# Patient Record
Sex: MALE | Race: OTHER | NOT HISPANIC OR LATINO
[De-identification: names, ages, dates, MRNs, and addresses within clinical notes are randomized per-mention and may not be internally consistent; named-entity substitution may affect disease eponyms.]

---

## 2017-04-26 ENCOUNTER — APPOINTMENT (OUTPATIENT)
Dept: UROLOGY | Facility: CLINIC | Age: 67
End: 2017-04-26

## 2017-04-27 LAB
APPEARANCE: CLEAR
BACTERIA: NEGATIVE
BILIRUBIN URINE: NEGATIVE
BLOOD URINE: NEGATIVE
COLOR: YELLOW
GLUCOSE QUALITATIVE U: NORMAL MG/DL
HYALINE CASTS: 0 /LPF
KETONES URINE: NEGATIVE
LEUKOCYTE ESTERASE URINE: NEGATIVE
MICROSCOPIC-UA: NORMAL
NITRITE URINE: NEGATIVE
PH URINE: 5.5
PROTEIN URINE: NEGATIVE MG/DL
PSA FREE FLD-MCNC: 20.5 %
PSA FREE SERPL-MCNC: 1.3 NG/ML
PSA SERPL-MCNC: 6.35 NG/ML
RED BLOOD CELLS URINE: 3 /HPF
SPECIFIC GRAVITY URINE: 1.01
SQUAMOUS EPITHELIAL CELLS: 0 /HPF
UROBILINOGEN URINE: NORMAL MG/DL
WHITE BLOOD CELLS URINE: 4 /HPF

## 2017-10-25 ENCOUNTER — APPOINTMENT (OUTPATIENT)
Dept: UROLOGY | Facility: CLINIC | Age: 67
End: 2017-10-25
Payer: COMMERCIAL

## 2017-10-25 PROCEDURE — 99214 OFFICE O/P EST MOD 30 MIN: CPT

## 2017-10-26 LAB
ANION GAP SERPL CALC-SCNC: 23 MMOL/L
APPEARANCE: CLEAR
BACTERIA: NEGATIVE
BILIRUBIN URINE: NEGATIVE
BLOOD URINE: NEGATIVE
BUN SERPL-MCNC: 21 MG/DL
CALCIUM SERPL-MCNC: 10.1 MG/DL
CHLORIDE SERPL-SCNC: 102 MMOL/L
CO2 SERPL-SCNC: 19 MMOL/L
COLOR: YELLOW
CORE LAB FLUID CYTOLOGY: NORMAL
CREAT SERPL-MCNC: 1.08 MG/DL
GLUCOSE QUALITATIVE U: NEGATIVE MG/DL
GLUCOSE SERPL-MCNC: 88 MG/DL
KETONES URINE: NEGATIVE
LEUKOCYTE ESTERASE URINE: NEGATIVE
MICROSCOPIC-UA: NORMAL
NITRITE URINE: NEGATIVE
PH URINE: 5.5
POTASSIUM SERPL-SCNC: 4.4 MMOL/L
PROTEIN URINE: NEGATIVE MG/DL
PSA FREE FLD-MCNC: 24.7
PSA FREE SERPL-MCNC: 1.94 NG/ML
PSA SERPL-MCNC: 7.84 NG/ML
RED BLOOD CELLS URINE: 1 /HPF
SODIUM SERPL-SCNC: 144 MMOL/L
SPECIFIC GRAVITY URINE: 1.01
SQUAMOUS EPITHELIAL CELLS: 0 /HPF
UROBILINOGEN URINE: NEGATIVE MG/DL
WHITE BLOOD CELLS URINE: 1 /HPF

## 2018-04-25 ENCOUNTER — APPOINTMENT (OUTPATIENT)
Dept: UROLOGY | Facility: CLINIC | Age: 68
End: 2018-04-25
Payer: COMMERCIAL

## 2018-04-25 DIAGNOSIS — R97.20 ELEVATED PROSTATE, SPECIFIC ANTIGEN [PSA]: ICD-10-CM

## 2018-04-25 DIAGNOSIS — Z00.00 ENCOUNTER FOR GENERAL ADULT MEDICAL EXAMINATION W/OUT ABNORMAL FINDINGS: ICD-10-CM

## 2018-04-25 PROCEDURE — 99214 OFFICE O/P EST MOD 30 MIN: CPT

## 2018-04-26 LAB
ANION GAP SERPL CALC-SCNC: 21 MMOL/L
APPEARANCE: CLEAR
BACTERIA: NEGATIVE
BILIRUBIN URINE: NEGATIVE
BLOOD URINE: NEGATIVE
BUN SERPL-MCNC: 21 MG/DL
CALCIUM SERPL-MCNC: 10.5 MG/DL
CHLORIDE SERPL-SCNC: 101 MMOL/L
CO2 SERPL-SCNC: 20 MMOL/L
COLOR: YELLOW
CREAT SERPL-MCNC: 1.17 MG/DL
GLUCOSE QUALITATIVE U: NEGATIVE MG/DL
GLUCOSE SERPL-MCNC: 91 MG/DL
HYALINE CASTS: 0 /LPF
KETONES URINE: NEGATIVE
LEUKOCYTE ESTERASE URINE: NEGATIVE
MICROSCOPIC-UA: NORMAL
NITRITE URINE: NEGATIVE
PH URINE: 5.5
POTASSIUM SERPL-SCNC: 4.9 MMOL/L
PROTEIN URINE: NEGATIVE MG/DL
PSA FREE FLD-MCNC: 29.3
PSA FREE SERPL-MCNC: 2.48 NG/ML
PSA SERPL-MCNC: 8.46 NG/ML
RED BLOOD CELLS URINE: 2 /HPF
SODIUM SERPL-SCNC: 142 MMOL/L
SPECIFIC GRAVITY URINE: 1.02
SQUAMOUS EPITHELIAL CELLS: 0 /HPF
UROBILINOGEN URINE: 1 MG/DL
WHITE BLOOD CELLS URINE: 0 /HPF

## 2018-10-31 ENCOUNTER — APPOINTMENT (OUTPATIENT)
Dept: UROLOGY | Facility: CLINIC | Age: 68
End: 2018-10-31
Payer: MEDICARE

## 2018-10-31 PROCEDURE — 99214 OFFICE O/P EST MOD 30 MIN: CPT

## 2018-11-01 LAB
APPEARANCE: CLEAR
BACTERIA: NEGATIVE
BILIRUBIN URINE: NEGATIVE
BLOOD URINE: NEGATIVE
COLOR: YELLOW
GLUCOSE QUALITATIVE U: NEGATIVE MG/DL
HYALINE CASTS: 1 /LPF
KETONES URINE: NEGATIVE
LEUKOCYTE ESTERASE URINE: NEGATIVE
MICROSCOPIC-UA: NORMAL
NITRITE URINE: NEGATIVE
PH URINE: 5.5
PROTEIN URINE: NEGATIVE MG/DL
PSA FREE FLD-MCNC: 27.7
PSA FREE SERPL-MCNC: 2.7 NG/ML
PSA SERPL-MCNC: 9.76 NG/ML
RED BLOOD CELLS URINE: 2 /HPF
SPECIFIC GRAVITY URINE: 1.02
SQUAMOUS EPITHELIAL CELLS: 0 /HPF
UROBILINOGEN URINE: 1 MG/DL
WHITE BLOOD CELLS URINE: 1 /HPF

## 2018-11-03 LAB — CORE LAB FLUID CYTOLOGY: NORMAL

## 2019-05-08 ENCOUNTER — APPOINTMENT (OUTPATIENT)
Dept: UROLOGY | Facility: CLINIC | Age: 69
End: 2019-05-08
Payer: MEDICARE

## 2019-05-08 PROCEDURE — 99214 OFFICE O/P EST MOD 30 MIN: CPT

## 2019-05-08 NOTE — PHYSICAL EXAM
[Normal Appearance] : normal appearance [General Appearance - Well Developed] : well developed [General Appearance - Well Nourished] : well nourished [Well Groomed] : well groomed [General Appearance - In No Acute Distress] : no acute distress [Abdomen Soft] : soft [Costovertebral Angle Tenderness] : no ~M costovertebral angle tenderness [Abdomen Tenderness] : non-tender [Testes Mass (___cm)] : there were no testicular masses [Scrotum] : the scrotum was normal [Urethral Meatus] : meatus normal [Urinary Bladder Findings] : the bladder was normal on palpation [No Prostate Nodules] : no prostate nodules [Edema] : no peripheral edema [Respiration, Rhythm And Depth] : normal respiratory rhythm and effort [] : no respiratory distress [Affect] : the affect was normal [Exaggerated Use Of Accessory Muscles For Inspiration] : no accessory muscle use [Oriented To Time, Place, And Person] : oriented to person, place, and time [Not Anxious] : not anxious [Mood] : the mood was normal [Normal Station and Gait] : the gait and station were normal for the patient's age [No Focal Deficits] : no focal deficits [No Palpable Adenopathy] : no palpable adenopathy

## 2019-05-09 LAB
APPEARANCE: CLEAR
BACTERIA: NEGATIVE
BILIRUBIN URINE: NEGATIVE
BLOOD URINE: NEGATIVE
COLOR: NORMAL
GLUCOSE QUALITATIVE U: NEGATIVE
HYALINE CASTS: 0 /LPF
KETONES URINE: NEGATIVE
LEUKOCYTE ESTERASE URINE: NEGATIVE
MICROSCOPIC-UA: NORMAL
NITRITE URINE: NEGATIVE
PH URINE: 6
PROTEIN URINE: NORMAL
PSA FREE FLD-MCNC: 24 %
PSA FREE SERPL-MCNC: 1.86 NG/ML
PSA SERPL-MCNC: 7.64 NG/ML
RED BLOOD CELLS URINE: 1 /HPF
SPECIFIC GRAVITY URINE: 1.02
SQUAMOUS EPITHELIAL CELLS: 0 /HPF
UROBILINOGEN URINE: NORMAL
WHITE BLOOD CELLS URINE: 0 /HPF

## 2019-12-11 ENCOUNTER — APPOINTMENT (OUTPATIENT)
Dept: UROLOGY | Facility: CLINIC | Age: 69
End: 2019-12-11
Payer: COMMERCIAL

## 2019-12-11 DIAGNOSIS — R97.20 ELEVATED PROSTATE, SPECIFIC ANTIGEN [PSA]: ICD-10-CM

## 2019-12-11 PROCEDURE — 99214 OFFICE O/P EST MOD 30 MIN: CPT

## 2019-12-11 NOTE — PHYSICAL EXAM
[General Appearance - Well Developed] : well developed [General Appearance - Well Nourished] : well nourished [Well Groomed] : well groomed [Normal Appearance] : normal appearance [General Appearance - In No Acute Distress] : no acute distress [Abdomen Soft] : soft [Costovertebral Angle Tenderness] : no ~M costovertebral angle tenderness [Abdomen Tenderness] : non-tender [Urethral Meatus] : meatus normal [Urinary Bladder Findings] : the bladder was normal on palpation [Scrotum] : the scrotum was normal [Testes Mass (___cm)] : there were no testicular masses [No Prostate Nodules] : no prostate nodules [] : no respiratory distress [Edema] : no peripheral edema [Respiration, Rhythm And Depth] : normal respiratory rhythm and effort [Affect] : the affect was normal [Oriented To Time, Place, And Person] : oriented to person, place, and time [Exaggerated Use Of Accessory Muscles For Inspiration] : no accessory muscle use [Mood] : the mood was normal [Not Anxious] : not anxious [Normal Station and Gait] : the gait and station were normal for the patient's age [No Palpable Adenopathy] : no palpable adenopathy [No Focal Deficits] : no focal deficits

## 2019-12-12 LAB
APPEARANCE: CLEAR
BACTERIA: NEGATIVE
BILIRUBIN URINE: NEGATIVE
BLOOD URINE: NEGATIVE
COLOR: NORMAL
GLUCOSE QUALITATIVE U: NEGATIVE
HYALINE CASTS: 1 /LPF
KETONES URINE: NEGATIVE
LEUKOCYTE ESTERASE URINE: NEGATIVE
MICROSCOPIC-UA: NORMAL
NITRITE URINE: NEGATIVE
PH URINE: 5.5
PROTEIN URINE: NORMAL
PSA FREE FLD-MCNC: 29 %
PSA FREE SERPL-MCNC: 2.41 NG/ML
PSA SERPL-MCNC: 8.18 NG/ML
RED BLOOD CELLS URINE: 0 /HPF
SPECIFIC GRAVITY URINE: 1.02
SQUAMOUS EPITHELIAL CELLS: 0 /HPF
UROBILINOGEN URINE: NORMAL
WHITE BLOOD CELLS URINE: 1 /HPF

## 2020-06-24 ENCOUNTER — APPOINTMENT (OUTPATIENT)
Dept: UROLOGY | Facility: CLINIC | Age: 70
End: 2020-06-24

## 2021-03-04 ENCOUNTER — APPOINTMENT (OUTPATIENT)
Dept: UROLOGY | Facility: CLINIC | Age: 71
End: 2021-03-04
Payer: MEDICARE

## 2021-03-04 PROCEDURE — 99214 OFFICE O/P EST MOD 30 MIN: CPT

## 2021-03-05 LAB
APPEARANCE: CLEAR
BACTERIA: NEGATIVE
BILIRUBIN URINE: NEGATIVE
BLOOD URINE: NEGATIVE
COLOR: YELLOW
GLUCOSE QUALITATIVE U: NEGATIVE
HYALINE CASTS: 0 /LPF
KETONES URINE: NEGATIVE
LEUKOCYTE ESTERASE URINE: NEGATIVE
MICROSCOPIC-UA: NORMAL
NITRITE URINE: NEGATIVE
PH URINE: 6
PROTEIN URINE: ABNORMAL
PSA FREE FLD-MCNC: 22 %
PSA FREE SERPL-MCNC: 1.57 NG/ML
PSA SERPL-MCNC: 7.16 NG/ML
RED BLOOD CELLS URINE: 1 /HPF
SPECIFIC GRAVITY URINE: 1.03
SQUAMOUS EPITHELIAL CELLS: 1 /HPF
UROBILINOGEN URINE: NORMAL
WHITE BLOOD CELLS URINE: 1 /HPF

## 2021-07-14 ENCOUNTER — OUTPATIENT (OUTPATIENT)
Dept: OUTPATIENT SERVICES | Facility: HOSPITAL | Age: 71
LOS: 1 days | End: 2021-07-14
Payer: MEDICARE

## 2021-07-14 ENCOUNTER — APPOINTMENT (OUTPATIENT)
Dept: ULTRASOUND IMAGING | Facility: CLINIC | Age: 71
End: 2021-07-14
Payer: MEDICARE

## 2021-07-14 DIAGNOSIS — Z00.8 ENCOUNTER FOR OTHER GENERAL EXAMINATION: ICD-10-CM

## 2021-07-14 PROCEDURE — 93970 EXTREMITY STUDY: CPT

## 2021-07-14 PROCEDURE — 93970 EXTREMITY STUDY: CPT | Mod: 26

## 2022-04-27 ENCOUNTER — APPOINTMENT (OUTPATIENT)
Dept: UROLOGY | Facility: CLINIC | Age: 72
End: 2022-04-27
Payer: MEDICARE

## 2022-04-27 DIAGNOSIS — R97.20 ELEVATED PROSTATE, SPECIFIC ANTIGEN [PSA]: ICD-10-CM

## 2022-04-27 PROCEDURE — 99214 OFFICE O/P EST MOD 30 MIN: CPT

## 2022-04-28 LAB
APPEARANCE: CLEAR
BACTERIA: NEGATIVE
BILIRUBIN URINE: NEGATIVE
BLOOD URINE: NEGATIVE
COLOR: YELLOW
GLUCOSE QUALITATIVE U: NEGATIVE
HYALINE CASTS: 0 /LPF
KETONES URINE: NEGATIVE
LEUKOCYTE ESTERASE URINE: NEGATIVE
MICROSCOPIC-UA: NORMAL
NITRITE URINE: NEGATIVE
PH URINE: 6
PROTEIN URINE: ABNORMAL
PSA FREE FLD-MCNC: 27 %
PSA FREE SERPL-MCNC: 1.99 NG/ML
PSA SERPL-MCNC: 7.48 NG/ML
RED BLOOD CELLS URINE: 1 /HPF
SPECIFIC GRAVITY URINE: 1.02
SQUAMOUS EPITHELIAL CELLS: 0 /HPF
UROBILINOGEN URINE: NORMAL
WHITE BLOOD CELLS URINE: 1 /HPF

## 2023-04-27 ENCOUNTER — APPOINTMENT (OUTPATIENT)
Dept: UROLOGY | Facility: CLINIC | Age: 73
End: 2023-04-27
Payer: MEDICARE

## 2023-04-27 DIAGNOSIS — R35.0 FREQUENCY OF MICTURITION: ICD-10-CM

## 2023-04-27 DIAGNOSIS — R97.20 ELEVATED PROSTATE, SPECIFIC ANTIGEN [PSA]: ICD-10-CM

## 2023-04-27 PROCEDURE — 88112 CYTOPATH CELL ENHANCE TECH: CPT | Mod: 26

## 2023-04-27 PROCEDURE — 99214 OFFICE O/P EST MOD 30 MIN: CPT

## 2023-04-28 LAB
APPEARANCE: CLEAR
BACTERIA: NEGATIVE /HPF
BILIRUBIN URINE: NEGATIVE
BLOOD URINE: NEGATIVE
CAST: 0 /LPF
COLOR: NORMAL
EPITHELIAL CELLS: 1 /HPF
GLUCOSE QUALITATIVE U: NEGATIVE MG/DL
KETONES URINE: NEGATIVE MG/DL
LEUKOCYTE ESTERASE URINE: NEGATIVE
MICROSCOPIC-UA: NORMAL
NITRITE URINE: NEGATIVE
PH URINE: 5.5
PROTEIN URINE: 30 MG/DL
PSA FREE FLD-MCNC: 24 %
PSA FREE SERPL-MCNC: 2.53 NG/ML
PSA SERPL-MCNC: 10.4 NG/ML
RED BLOOD CELLS URINE: 1 /HPF
SPECIFIC GRAVITY URINE: 1.02
URINE CYTOLOGY: NORMAL
UROBILINOGEN URINE: 0.2 MG/DL
WHITE BLOOD CELLS URINE: 1 /HPF

## 2024-03-01 ENCOUNTER — OFFICE (OUTPATIENT)
Dept: URBAN - METROPOLITAN AREA CLINIC 70 | Facility: CLINIC | Age: 74
Setting detail: OPHTHALMOLOGY
End: 2024-03-01
Payer: MEDICARE

## 2024-03-01 ENCOUNTER — RX ONLY (RX ONLY)
Age: 74
End: 2024-03-01

## 2024-03-01 DIAGNOSIS — H25.13: ICD-10-CM

## 2024-03-01 DIAGNOSIS — H16.223: ICD-10-CM

## 2024-03-01 PROCEDURE — 92004 COMPRE OPH EXAM NEW PT 1/>: CPT | Performed by: STUDENT IN AN ORGANIZED HEALTH CARE EDUCATION/TRAINING PROGRAM

## 2024-03-01 ASSESSMENT — REFRACTION_CURRENTRX
OS_ADD: +2.50
OS_CYLINDER: -3.25
OD_OVR_VA: 20/
OS_SPHERE: +1.00
OD_ADD: +2.50
OD_AXIS: 97
OD_CYLINDER: -3.50
OD_SPHERE: +0.50
OS_AXIS: 082
OS_OVR_VA: 20/

## 2024-05-01 ENCOUNTER — APPOINTMENT (OUTPATIENT)
Dept: UROLOGY | Facility: CLINIC | Age: 74
End: 2024-05-01
Payer: MEDICARE

## 2024-05-01 DIAGNOSIS — N40.1 BENIGN PROSTATIC HYPERPLASIA WITH LOWER URINARY TRACT SYMPMS: ICD-10-CM

## 2024-05-01 DIAGNOSIS — N13.8 BENIGN PROSTATIC HYPERPLASIA WITH LOWER URINARY TRACT SYMPMS: ICD-10-CM

## 2024-05-01 PROCEDURE — 99214 OFFICE O/P EST MOD 30 MIN: CPT

## 2024-05-01 PROCEDURE — G2211 COMPLEX E/M VISIT ADD ON: CPT

## 2024-05-02 LAB
APPEARANCE: CLEAR
BACTERIA: NEGATIVE /HPF
BILIRUBIN URINE: NEGATIVE
BLOOD URINE: NEGATIVE
CAST: 1 /LPF
COLOR: YELLOW
EPITHELIAL CELLS: 1 /HPF
GLUCOSE QUALITATIVE U: NEGATIVE MG/DL
KETONES URINE: NEGATIVE MG/DL
LEUKOCYTE ESTERASE URINE: NEGATIVE
MICROSCOPIC-UA: NORMAL
NITRITE URINE: NEGATIVE
PH URINE: 5.5
PROTEIN URINE: NORMAL MG/DL
PSA FREE FLD-MCNC: 24 %
PSA FREE SERPL-MCNC: 2.29 NG/ML
PSA SERPL-MCNC: 9.55 NG/ML
RED BLOOD CELLS URINE: 0 /HPF
SPECIFIC GRAVITY URINE: 1.02
UROBILINOGEN URINE: 0.2 MG/DL
WHITE BLOOD CELLS URINE: 1 /HPF

## 2024-06-27 ASSESSMENT — REFRACTION_CURRENTRX
OS_OVR_VA: 20/
OS_AXIS: 082
OD_CYLINDER: -3.50
OD_AXIS: 97
OD_ADD: +2.50
OS_CYLINDER: -3.25
OD_SPHERE: +0.50
OS_ADD: +2.50
OS_SPHERE: +1.00
OD_OVR_VA: 20/

## 2024-06-27 ASSESSMENT — REFRACTION_MANIFEST
OS_CYLINDER: -3.25
OD_ADD: +2.50
OD_AXIS: 97
OD_SPHERE: +0.50
OS_AXIS: 082
OS_SPHERE: +1.00
OD_CYLINDER: -3.50
OS_ADD: +2.50

## 2024-06-27 ASSESSMENT — KERATOMETRY
OS_K1POWER_DIOPTERS: 42.75
OS_K2POWER_DIOPTERS: 45.00
OD_K1POWER_DIOPTERS: 42.75
OD_AXISANGLE_DEGREES: 002
OD_K2POWER_DIOPTERS: 45.50
OS_AXISANGLE_DEGREES: 003

## 2024-08-06 VITALS
TEMPERATURE: 97.8 F | WEIGHT: 214 LBS | SYSTOLIC BLOOD PRESSURE: 119 MMHG | HEIGHT: 70 IN | OXYGEN SATURATION: 94 % | HEART RATE: 67 BPM | DIASTOLIC BLOOD PRESSURE: 64 MMHG | RESPIRATION RATE: 17 BRPM | BODY MASS INDEX: 30.64 KG/M2

## 2024-09-13 ENCOUNTER — NON-APPOINTMENT (OUTPATIENT)
Age: 74
End: 2024-09-13

## 2024-09-13 DIAGNOSIS — N28.9 DISORDER OF KIDNEY AND URETER, UNSPECIFIED: ICD-10-CM

## 2024-09-13 DIAGNOSIS — I25.10 ATHEROSCLEROTIC HEART DISEASE OF NATIVE CORONARY ARTERY W/OUT ANGINA PECTORIS: ICD-10-CM

## 2024-09-13 DIAGNOSIS — G20.A1 PARKINSON'S DISEASE WITHOUT DYSKINESIA, WITHOUT MENTION OF FLUCTUATIONS: ICD-10-CM

## 2024-09-13 RX ORDER — TAMSULOSIN HYDROCHLORIDE 0.4 MG/1
0.4 CAPSULE ORAL
Refills: 0 | Status: ACTIVE | COMMUNITY

## 2024-11-04 ENCOUNTER — INPATIENT (INPATIENT)
Facility: HOSPITAL | Age: 74
LOS: 3 days | Discharge: NURSING, REHAB OR LONG-TERM CARE | DRG: 641 | End: 2024-11-08
Attending: STUDENT IN AN ORGANIZED HEALTH CARE EDUCATION/TRAINING PROGRAM | Admitting: STUDENT IN AN ORGANIZED HEALTH CARE EDUCATION/TRAINING PROGRAM
Payer: MEDICARE

## 2024-11-04 VITALS
HEART RATE: 68 BPM | SYSTOLIC BLOOD PRESSURE: 185 MMHG | OXYGEN SATURATION: 99 % | RESPIRATION RATE: 18 BRPM | TEMPERATURE: 98 F | HEIGHT: 70 IN | WEIGHT: 214.07 LBS | DIASTOLIC BLOOD PRESSURE: 74 MMHG

## 2024-11-04 DIAGNOSIS — Z95.1 PRESENCE OF AORTOCORONARY BYPASS GRAFT: Chronic | ICD-10-CM

## 2024-11-04 LAB
ALBUMIN SERPL ELPH-MCNC: 3.5 G/DL — SIGNIFICANT CHANGE UP (ref 3.3–5)
ALP SERPL-CCNC: 67 U/L — SIGNIFICANT CHANGE UP (ref 40–120)
ALT FLD-CCNC: 14 U/L — SIGNIFICANT CHANGE UP (ref 10–45)
ANION GAP SERPL CALC-SCNC: 5 MMOL/L — SIGNIFICANT CHANGE UP (ref 5–17)
APPEARANCE UR: CLEAR — SIGNIFICANT CHANGE UP
AST SERPL-CCNC: 56 U/L — HIGH (ref 10–40)
BASOPHILS # BLD AUTO: 0.06 K/UL — SIGNIFICANT CHANGE UP (ref 0–0.2)
BASOPHILS NFR BLD AUTO: 0.7 % — SIGNIFICANT CHANGE UP (ref 0–2)
BILIRUB SERPL-MCNC: 0.9 MG/DL — SIGNIFICANT CHANGE UP (ref 0.2–1.2)
BILIRUB UR-MCNC: NEGATIVE — SIGNIFICANT CHANGE UP
BUN SERPL-MCNC: 32 MG/DL — HIGH (ref 7–23)
CALCIUM SERPL-MCNC: 9.2 MG/DL — SIGNIFICANT CHANGE UP (ref 8.4–10.5)
CHLORIDE SERPL-SCNC: 106 MMOL/L — SIGNIFICANT CHANGE UP (ref 96–108)
CO2 SERPL-SCNC: 26 MMOL/L — SIGNIFICANT CHANGE UP (ref 22–31)
COLOR SPEC: SIGNIFICANT CHANGE UP
CREAT SERPL-MCNC: 0.95 MG/DL — SIGNIFICANT CHANGE UP (ref 0.5–1.3)
DIFF PNL FLD: NEGATIVE — SIGNIFICANT CHANGE UP
EGFR: 83 ML/MIN/1.73M2 — SIGNIFICANT CHANGE UP
EOSINOPHIL # BLD AUTO: 0.12 K/UL — SIGNIFICANT CHANGE UP (ref 0–0.5)
EOSINOPHIL NFR BLD AUTO: 1.4 % — SIGNIFICANT CHANGE UP (ref 0–6)
GLUCOSE SERPL-MCNC: 96 MG/DL — SIGNIFICANT CHANGE UP (ref 70–99)
GLUCOSE UR QL: NEGATIVE MG/DL — SIGNIFICANT CHANGE UP
HCT VFR BLD CALC: 38.5 % — LOW (ref 39–50)
HGB BLD-MCNC: 13.4 G/DL — SIGNIFICANT CHANGE UP (ref 13–17)
IMM GRANULOCYTES NFR BLD AUTO: 0.2 % — SIGNIFICANT CHANGE UP (ref 0–0.9)
KETONES UR-MCNC: NEGATIVE MG/DL — SIGNIFICANT CHANGE UP
LEUKOCYTE ESTERASE UR-ACNC: NEGATIVE — SIGNIFICANT CHANGE UP
LYMPHOCYTES # BLD AUTO: 1.61 K/UL — SIGNIFICANT CHANGE UP (ref 1–3.3)
LYMPHOCYTES # BLD AUTO: 18.2 % — SIGNIFICANT CHANGE UP (ref 13–44)
MCHC RBC-ENTMCNC: 28.9 PG — SIGNIFICANT CHANGE UP (ref 27–34)
MCHC RBC-ENTMCNC: 34.8 G/DL — SIGNIFICANT CHANGE UP (ref 32–36)
MCV RBC AUTO: 83 FL — SIGNIFICANT CHANGE UP (ref 80–100)
MONOCYTES # BLD AUTO: 0.77 K/UL — SIGNIFICANT CHANGE UP (ref 0–0.9)
MONOCYTES NFR BLD AUTO: 8.7 % — SIGNIFICANT CHANGE UP (ref 2–14)
NEUTROPHILS # BLD AUTO: 6.25 K/UL — SIGNIFICANT CHANGE UP (ref 1.8–7.4)
NEUTROPHILS NFR BLD AUTO: 70.8 % — SIGNIFICANT CHANGE UP (ref 43–77)
NITRITE UR-MCNC: NEGATIVE — SIGNIFICANT CHANGE UP
NRBC # BLD: 0 /100 WBCS — SIGNIFICANT CHANGE UP (ref 0–0)
PH UR: 5.5 — SIGNIFICANT CHANGE UP (ref 5–8)
PLATELET # BLD AUTO: 134 K/UL — LOW (ref 150–400)
POTASSIUM SERPL-MCNC: 3.7 MMOL/L — SIGNIFICANT CHANGE UP (ref 3.5–5.3)
POTASSIUM SERPL-MCNC: 5.8 MMOL/L — HIGH (ref 3.5–5.3)
POTASSIUM SERPL-SCNC: 3.7 MMOL/L — SIGNIFICANT CHANGE UP (ref 3.5–5.3)
POTASSIUM SERPL-SCNC: 5.8 MMOL/L — HIGH (ref 3.5–5.3)
PROT SERPL-MCNC: 7.1 G/DL — SIGNIFICANT CHANGE UP (ref 6–8.3)
PROT UR-MCNC: NEGATIVE MG/DL — SIGNIFICANT CHANGE UP
RBC # BLD: 4.64 M/UL — SIGNIFICANT CHANGE UP (ref 4.2–5.8)
RBC # FLD: 15.7 % — HIGH (ref 10.3–14.5)
SODIUM SERPL-SCNC: 137 MMOL/L — SIGNIFICANT CHANGE UP (ref 135–145)
SP GR SPEC: 1.02 — SIGNIFICANT CHANGE UP (ref 1–1.03)
UROBILINOGEN FLD QL: 1 MG/DL — SIGNIFICANT CHANGE UP (ref 0.2–1)
WBC # BLD: 8.83 K/UL — SIGNIFICANT CHANGE UP (ref 3.8–10.5)
WBC # FLD AUTO: 8.83 K/UL — SIGNIFICANT CHANGE UP (ref 3.8–10.5)

## 2024-11-04 PROCEDURE — 99285 EMERGENCY DEPT VISIT HI MDM: CPT

## 2024-11-04 PROCEDURE — 99497 ADVNCD CARE PLAN 30 MIN: CPT

## 2024-11-04 RX ORDER — ROSUVASTATIN CALCIUM 10 MG
10 TABLET ORAL AT BEDTIME
Refills: 0 | Status: DISCONTINUED | OUTPATIENT
Start: 2024-11-05 | End: 2024-11-08

## 2024-11-04 RX ORDER — TAMSULOSIN HCL 0.4 MG
0.4 CAPSULE ORAL AT BEDTIME
Refills: 0 | Status: DISCONTINUED | OUTPATIENT
Start: 2024-11-04 | End: 2024-11-08

## 2024-11-04 RX ORDER — CARBIDOPA/LEVODOPA 10MG-100MG
1 TABLET ORAL EVERY 24 HOURS
Refills: 0 | Status: DISCONTINUED | OUTPATIENT
Start: 2024-11-05 | End: 2024-11-08

## 2024-11-04 RX ORDER — METOPROLOL TARTRATE 50 MG
100 TABLET ORAL DAILY
Refills: 0 | Status: DISCONTINUED | OUTPATIENT
Start: 2024-11-05 | End: 2024-11-08

## 2024-11-04 RX ORDER — ENTACAPONE 200 MG/1
200 TABLET, FILM COATED ORAL
Refills: 0 | Status: DISCONTINUED | OUTPATIENT
Start: 2024-11-04 | End: 2024-11-08

## 2024-11-04 RX ORDER — ONDANSETRON HYDROCHLORIDE 2 MG/ML
4 INJECTION, SOLUTION INTRAMUSCULAR; INTRAVENOUS EVERY 8 HOURS
Refills: 0 | Status: DISCONTINUED | OUTPATIENT
Start: 2024-11-04 | End: 2024-11-08

## 2024-11-04 RX ORDER — PRAMIPEXOLE DIHYDROCHLORIDE 0.12 MG/1
1 TABLET ORAL THREE TIMES A DAY
Refills: 0 | Status: DISCONTINUED | OUTPATIENT
Start: 2024-11-04 | End: 2024-11-08

## 2024-11-04 RX ORDER — DONEPEZIL HYDROCHLORIDE 23 MG/1
10 TABLET ORAL AT BEDTIME
Refills: 0 | Status: DISCONTINUED | OUTPATIENT
Start: 2024-11-04 | End: 2024-11-08

## 2024-11-04 RX ORDER — ACETAMINOPHEN 500 MG
650 TABLET ORAL EVERY 6 HOURS
Refills: 0 | Status: DISCONTINUED | OUTPATIENT
Start: 2024-11-04 | End: 2024-11-08

## 2024-11-04 RX ORDER — CARBIDOPA/LEVODOPA 10MG-100MG
1 TABLET ORAL
Refills: 0 | Status: DISCONTINUED | OUTPATIENT
Start: 2024-11-04 | End: 2024-11-08

## 2024-11-04 RX ORDER — SODIUM CHLORIDE 9 MG/ML
500 INJECTION, SOLUTION INTRAMUSCULAR; INTRAVENOUS; SUBCUTANEOUS ONCE
Refills: 0 | Status: COMPLETED | OUTPATIENT
Start: 2024-11-04 | End: 2024-11-04

## 2024-11-04 RX ORDER — ENOXAPARIN SODIUM 40MG/0.4ML
40 SYRINGE (ML) SUBCUTANEOUS EVERY 24 HOURS
Refills: 0 | Status: DISCONTINUED | OUTPATIENT
Start: 2024-11-05 | End: 2024-11-08

## 2024-11-04 RX ORDER — ASPIRIN/MAG CARB/ALUMINUM AMIN 325 MG
81 TABLET ORAL AT BEDTIME
Refills: 0 | Status: DISCONTINUED | OUTPATIENT
Start: 2024-11-04 | End: 2024-11-08

## 2024-11-04 RX ORDER — MAGNESIUM, ALUMINUM HYDROXIDE 200-200 MG
30 TABLET,CHEWABLE ORAL EVERY 4 HOURS
Refills: 0 | Status: DISCONTINUED | OUTPATIENT
Start: 2024-11-04 | End: 2024-11-08

## 2024-11-04 RX ORDER — MELATONIN 5 MG
3 TABLET ORAL AT BEDTIME
Refills: 0 | Status: DISCONTINUED | OUTPATIENT
Start: 2024-11-04 | End: 2024-11-08

## 2024-11-04 RX ORDER — FUROSEMIDE 40 MG
20 TABLET ORAL DAILY
Refills: 0 | Status: COMPLETED | OUTPATIENT
Start: 2024-11-05 | End: 2024-11-07

## 2024-11-04 RX ADMIN — ENTACAPONE 200 MILLIGRAM(S): 200 TABLET, FILM COATED ORAL at 20:05

## 2024-11-04 RX ADMIN — DONEPEZIL HYDROCHLORIDE 10 MILLIGRAM(S): 23 TABLET ORAL at 20:05

## 2024-11-04 RX ADMIN — PRAMIPEXOLE DIHYDROCHLORIDE 1 MILLIGRAM(S): 0.12 TABLET ORAL at 22:05

## 2024-11-04 RX ADMIN — SODIUM CHLORIDE 500 MILLILITER(S): 9 INJECTION, SOLUTION INTRAMUSCULAR; INTRAVENOUS; SUBCUTANEOUS at 16:35

## 2024-11-04 RX ADMIN — Medication 81 MILLIGRAM(S): at 20:05

## 2024-11-04 RX ADMIN — Medication 500 MILLIGRAM(S): at 22:05

## 2024-11-04 RX ADMIN — Medication 0.4 MILLIGRAM(S): at 22:09

## 2024-11-04 RX ADMIN — Medication 1 TABLET(S): at 20:04

## 2024-11-04 RX ADMIN — SODIUM CHLORIDE 1000 MILLILITER(S): 9 INJECTION, SOLUTION INTRAMUSCULAR; INTRAVENOUS; SUBCUTANEOUS at 14:07

## 2024-11-04 NOTE — ED ADULT NURSE REASSESSMENT NOTE - NS ED NURSE REASSESS COMMENT FT1
Patient awaiting bed. Wife at bedside. patient fall precaution. Red socks and yellow band in place. Patient closer to nurses' station. Continuous monitoring.

## 2024-11-04 NOTE — ED ADULT NURSE NOTE - NSFALLHARMRISKINTERV_ED_ALL_ED
Assistance OOB with selected safe patient handling equipment if applicable/Communicate risk of Fall with Harm to all staff, patient, and family/Monitor gait and stability/Provide patient with walking aids/Provide visual cue: red socks, yellow wristband, yellow gown, etc/Reinforce activity limits and safety measures with patient and family/Bed in lowest position, wheels locked, appropriate side rails in place/Call bell, personal items and telephone in reach/Instruct patient to call for assistance before getting out of bed/chair/stretcher/Non-slip footwear applied when patient is off stretcher/[address] to call system/Physically safe environment - no spills, clutter or unnecessary equipment/Purposeful Proactive Rounding/Room/bathroom lighting operational, light cord in reach

## 2024-11-04 NOTE — H&P ADULT - NSICDXFAMILYHX_GEN_ALL_CORE_FT
FAMILY HISTORY:  Father  Still living? Unknown  FH: leukemia, Age at diagnosis: Age Unknown    Mother  Still living? Unknown  FH: CAD (coronary artery disease), Age at diagnosis: Age Unknown

## 2024-11-04 NOTE — ED PROVIDER NOTE - OBJECTIVE STATEMENT
The patient presents today with worsening symptoms of Parkinson's, including increased frequency of falls and new onset hallucinations.  - Chief Complaint (CC) : The patient has been falling frequently, including two to three times in the past week, and experiencing hallucinations.  - History of Present Illness : The patient, who has been diagnosed with Parkinson's disease, has been experiencing increased symptoms. There has been significant decline in mobility with frequent falls, including two to three times in the past week, as well as new onset hallucinations. Although the patient utilizes a walker, the number of falls has increased. Additionally, the patient has stated that the floor appears uneven to him.  - Past Medical History : The patient has a complex medical history including Parkinson's disease, heart disease requiring two bypass operations at the age of 55 and again eight and a half years ago, and an enlarged prostate. He also reportedly has 15 stents placed for his heart condition, but he has not had a heart attack.  - Past Surgical History : The patient has a history of two bypass surgeries, one at the age of 55 and another about eight and a half years ago. He also has 15 stents placed for his heart condition.  - Family History : Not mentioned in the conversation.  - Social History :  - Living Situation: Lives at home with family member    - Former smoker    - Review of Systems : The patient is experiencing worsening mobility, increased falls, and new onset hallucinations - all concerning as they could indicate progression of the Parkinson's disease.  - General : The patient has been experiencing increased difficulty with mobility and falls.  - Neurological : Patient reports new onset hallucinations.  - Musculoskeletal : The patient's mobility has worsened with frequent falls reported.  - Cardiovascular : The patient has a history of heart problems requiring past surgical interventions.  - Respiratory : Not Mentioned  - Gastrointestinal : Not Mentioned  - Genitourinary : The patient has a history of enlarged prostate.  - Integumentary : Not Mentioned  - Psychiatric : Not Mentioned  - Medications : Not Mentioned  - Allergies : Not Mentioned  Objective:  - Diagnostic Results : The results of future diagnostic tests will be reported here.  - Vital Signs : Not Mentioned  - Physical Examination (PE) : Provider mentioned that a physical examination would be conducted, but no results were provided during this conversation.  Assessment and Plan:  - Parkinson's Disease : Based on the increasing frequency of falls and new onset hallucinations, it appears that the patient's Parkinson's disease may be progressing.  - Therapeutic Interventions: Potential adjustment of existing medication regimen or introduction of new medications to better manage Parkinson's symptoms and prevent falls.    - Diagnostic Tests: Considering the new onset hallucinations and increasing unsteadiness, referral for neurological workup and imaging may be warranted to rule out other underlying issues.    - Referrals: Potential referral to a neurologist for expert management of Parkinson's, as well as physical therapy consultation for safe mobility.    - Patient Education: Advising the patient and family on safety measures to prevent falls at home.    - Follow-Up: Regular follow-up to monitor the progression of symptoms.

## 2024-11-04 NOTE — ED ADULT NURSE REASSESSMENT NOTE - NS ED NURSE REASSESS COMMENT FT1
Patient voided 400 cc of yellow urine, UA & C/S collected and sent to lab. Continuous monitoring. Wife at bedside.

## 2024-11-04 NOTE — ED ADULT TRIAGE NOTE - CHIEF COMPLAINT QUOTE
Wife brings pt in for worsening parkinson's and walking. States pt has multiple falls past week with last fall yesterday. Wife also states pt been having hallucinations at times. Denies head injury, LOC, or blood thinners.

## 2024-11-04 NOTE — H&P ADULT - NSICDXPASTMEDICALHX_GEN_ALL_CORE_FT
PAST MEDICAL HISTORY:  CAD (coronary artery disease)     H/O Parkinson's disease     History of BPH

## 2024-11-04 NOTE — H&P ADULT - NSHPPHYSICALEXAM_GEN_ALL_CORE
T(C): 36.8 (11-04-24 @ 11:42), Max: 36.8 (11-04-24 @ 11:42)  HR: 68 (11-04-24 @ 11:42) (68 - 68)  BP: 185/74 (11-04-24 @ 11:42) (185/74 - 185/74)  RR: 18 (11-04-24 @ 11:42) (18 - 18)  SpO2: 99% (11-04-24 @ 11:42) (99% - 99%)  Wt(kg): --Vital Signs Last 24 Hrs  T(C): 36.8 (04 Nov 2024 11:42), Max: 36.8 (04 Nov 2024 11:42)  T(F): 98.3 (04 Nov 2024 11:42), Max: 98.3 (04 Nov 2024 11:42)  HR: 68 (04 Nov 2024 11:42) (68 - 68)  BP: 185/74 (04 Nov 2024 11:42) (185/74 - 185/74)  BP(mean): --  RR: 18 (04 Nov 2024 11:42) (18 - 18)  SpO2: 99% (04 Nov 2024 11:42) (99% - 99%)    Parameters below as of 04 Nov 2024 11:42  Patient On (Oxygen Delivery Method): room air    PHYSICAL EXAM:  GENERAL: NAD  HEENT: NCAT  NECK: Supple, No JVD  CHEST/LUNG: Clear to percussion bilaterally; No rales, rhonchi, wheezing  HEART: Regular rate and rhythm  ABDOMEN: Soft, Nontender, Nondistended; Bowel sounds present  MUSCULOSKELETAL/EXTREMITIES:  1+ pitting pedal edema extending to mid shin, left lower shin blister  SKIN: Scattered scratches on UE and LE   PSYCH: Appropriate affect  NEURO: AAO x 3, +bradykinesia, occasional hand tremor, moves all 4's T(C): 36.8 (11-04-24 @ 11:42), Max: 36.8 (11-04-24 @ 11:42)  HR: 68 (11-04-24 @ 11:42) (68 - 68)  BP: 185/74 (11-04-24 @ 11:42) (185/74 - 185/74)  RR: 18 (11-04-24 @ 11:42) (18 - 18)  SpO2: 99% (11-04-24 @ 11:42) (99% - 99%)  Wt(kg): --Vital Signs Last 24 Hrs  T(C): 36.8 (04 Nov 2024 11:42), Max: 36.8 (04 Nov 2024 11:42)  T(F): 98.3 (04 Nov 2024 11:42), Max: 98.3 (04 Nov 2024 11:42)  HR: 68 (04 Nov 2024 11:42) (68 - 68)  BP: 185/74 (04 Nov 2024 11:42) (185/74 - 185/74)  BP(mean): --  RR: 18 (04 Nov 2024 11:42) (18 - 18)  SpO2: 99% (04 Nov 2024 11:42) (99% - 99%)    Parameters below as of 04 Nov 2024 11:42  Patient On (Oxygen Delivery Method): room air    PHYSICAL EXAM:  GENERAL: NAD  HEENT: NCAT  NECK: Supple, No JVD  CHEST/LUNG: Clear to percussion bilaterally; No rales, rhonchi, wheezing  HEART: Regular rate and rhythm  ABDOMEN: Soft, Nontender, Nondistended; Bowel sounds present  MUSCULOSKELETAL/EXTREMITIES:  1+ pitting pedal edema extending to mid shin, left lower shin blister  No pain on palpation of cervical/thoracic/lumbar spine, FROM bilateral shoulder/elbow/wrists/hips/knees/ankles, no focal pain elicited   SKIN: Scattered scratches on UE and LE   PSYCH: Appropriate affect  NEURO: AAO x 3, +bradykinesia, occasional hand tremor, moves all 4's

## 2024-11-04 NOTE — PHYSICAL THERAPY INITIAL EVALUATION ADULT - PERTINENT HX OF CURRENT PROBLEM, REHAB EVAL
Wife brings pt in for worsening parkinson's and walking. States pt has multiple falls past week with last fall yesterday. Wife also states pt been having hallucinations at times. Denies head injury, LOC, or blood thinners.  fall

## 2024-11-04 NOTE — PHYSICAL THERAPY INITIAL EVALUATION ADULT - ADDITIONAL COMMENTS
pt lives w/spouse in private house, 5 vikash w/rail, stair lift to br. pt uses RW to ambulate, has commode and shower chair. Spouse assists w/ADL's

## 2024-11-04 NOTE — H&P ADULT - HISTORY OF PRESENT ILLNESS
74 year old male with past medical history of HTN, HLD, CAD s/p PCI/CABG x 2 (last 8 years ago), BPH, Parkinson's Disease (dx 8 years ago, follows with Dr. Claudio) who presents from home for frequent falls and hallucinations.   Patient is AAO x 3, wife at bedside provided collateral informations.   Patient lives at home with his wife, at baseline he ambulates with a walker and is able to perform his ADL's.   He's had history of occasional falls with increased frequency over the last 1-2 weeks (2-3 times this week) attributed to gait instability due to stiffness and feeling "frozen". He denies head trauma/LOC/LH/dizziness/HA/CP/SOB/palpitation prior to fall. No reported injury.   He denies recent illness/fever/chills. No cough or new urinary complaints. No new medications.   Due to these falls, he has been more immobile and over the weekend, family noted increased LE edema for which he takes PRN lasix. He denies chest pain/SOB/orthopnea.  He came in today for evaluation for the Parkinson's Re[facility].     In the ED, he was afebrile and hemodynamically stable.  Labs notable for K 5.8 (suspect hemolyzed), BUN/Cr 32/0.95.   LE duplex negative for DVT.  He given IVF.    He is being admitted for further management.    74 year old male with past medical history of HTN, HLD, CAD s/p PCI/CABG x 2 (last 8 years ago), BPH, Parkinson's Disease (dx 8 years ago, follows with Dr. Claudio) who presents from home for frequent falls and hallucinations.   Patient is AAO x 3, wife at bedside provided collateral informations.   Patient lives at home with his wife, at baseline he ambulates with a walker and is able to perform his ADL's.   He's had history of occasional falls with increased frequency over the last 1-2 weeks (2-3 times this week) attributed to gait instability due to stiffness and feeling "frozen". He denies head trauma/LOC/LH/dizziness/HA/CP/SOB/palpitation prior to fall. No reported injury.   He denies recent illness/fever/chills. No cough or new urinary complaints. No new medications.  Due to these falls, he has been more immobile and over the weekend, family noted increased LE edema for which he takes PRN lasix. He denies chest pain/SOB/orthopnea.   Patient also endorses increase hallucination episodes (e.g. snakes in the house, having boots on when he didn't)  He came in today for evaluation for the [facility] Re[facility].     In the ED, he was afebrile and hemodynamically stable.  Labs notable for K 5.8 (suspect hemolyzed), BUN/Cr 32/0.95.   CTH is negative for acute intracranial pathology.   LE duplex negative for DVT.  He given IVF.    He is being admitted for further management.

## 2024-11-04 NOTE — H&P ADULT - ASSESSMENT
74 year old male with past medical history of HTN, HLD, CAD s/p PCI/CABG x 2 (last 8 years ago), BPH, Parkinson's Disease (dx 8 years ago, follows with Dr. Claudio) who presents from home for frequent falls and hallucinations.     Recurrent Falls and Hallucinations  Gait Instability and Functional Impairment  -Suspect due to underlying Parkinson's Disease   -CTH showed no evidence of acute intracranial pathology  -No obvious injuries on exam, will hold off on further imaging   -Less likely infectious process (no fever/leukocytosis): UA negative, CXR personally reviewed showed no evidence of focal consolidation, follow up official read. No   -Fall precaution  -PT/OT/PMR evaluation     Parkinson's Disease   -Continue home regimen: carbidopa-levodopa 25-250mg 7AM, 11AM, 3PM, 7PM, carbidopa-levodopa CR 25-100mg 12AM  -Continue entacapone 200mg QID  -Continue Pramipexole 1mg TID  -Continue donepezil  -PMR eval for Parkinson's Rehab    Bilateral LE Edema  -Suspect due to dependent edema/venous insufficiency   -LE duplex negative for DVT  -No evidence of CHF on exam, can check pro-BNP   -Continue Lasix 40mg QD x 3 days for symptomatic relief   -Encourage leg elevation   -Can try ACE if no improvement    HTN/HLD  CAD s/p PCI/CABG x 2 (Last 8 years ago)  -Patient denies cardiac complaints  -CXR personally reviewed showed no focal consolidation/effusions, follow up official read  -Echo done 6/2024 - can try to obtain record from Dr. Cervantes office in AM (office closed today)  -Continue aspirin, metoprolol, rosuvastatin, niacin  -Outpatient follow up with cardiology     Hyperkalemia   -Suspect specimen hemolyzed  -Will repeat K   -Monitor on telemetry for now   -Further management pending repeat K    BPH  -Continue tamsulosin     DVT PPx  -Lovenox SC     Plan of care discussed with patient and his wife at bedside, all questions were answered    74 year old male with past medical history of HTN, HLD, CAD s/p PCI/CABG x 2 (last 8 years ago), BPH, Parkinson's Disease (dx 8 years ago, follows with Dr. Claudio) who presents from home for frequent falls and hallucinations.     Recurrent Falls and Hallucinations  Gait Instability and Functional Impairment  -Suspect due to underlying Parkinson's Disease   -CTH showed no evidence of acute intracranial pathology  -No obvious injuries on exam, will hold off on further imaging   -Less likely infectious process (no fever/leukocytosis): UA negative, CXR personally reviewed showed no evidence of focal consolidation, follow up official read.   -Fall precaution  -PT/OT/PMR evaluation     Parkinson's Disease   -Continue home regimen: carbidopa-levodopa 25-250mg 7AM, 11AM, 3PM, 7PM, carbidopa-levodopa CR 25-100mg 12AM  -Continue entacapone 200mg at 7AM, 11AM, 3PM, 7PM,  -Continue Pramipexole 1mg TID  -Continue donepezil  -PMR eval for Parkinson's Rehab    Bilateral LE Edema  -Suspect due to dependent edema/venous insufficiency   -LE duplex negative for DVT  -No evidence of CHF on exam, CXR personally reviewed: unremarkable, can check pro-BNP   -Continue Lasix 40mg QD x 3 days for symptomatic relief   -Encourage leg elevation   -Can try ACE if no improvement    HTN/HLD  CAD s/p PCI/CABG x 2 (Last 8 years ago)  -Patient denies cardiac complaints  -CXR personally reviewed showed no focal consolidation/effusions, follow up official read. Follow up pro-BNP  -Echo done 6/2024 - can try to obtain record from Dr. Cervantes office in AM (office closed today)  -Continue aspirin, metoprolol, rosuvastatin, niacin  -Outpatient follow up with cardiology     Hyperkalemia   -Suspect specimen hemolyzed  -Will repeat K   -Monitor on telemetry for now   -Further management pending repeat K    BPH  -Continue tamsulosin     DVT PPx  -Lovenox SC     Plan of care discussed with patient and his wife Naga at bedside, they are in agreement, all questions were answered    74 year old male with past medical history of HTN, HLD, CAD s/p PCI/CABG x 2 (last 8 years ago), BPH, Parkinson's Disease (dx 8 years ago, follows with Dr. Claudio) who presents from home for frequent falls and hallucinations.     Recurrent Falls and Hallucinations  Gait Instability and Functional Impairment  -Suspect due to underlying Parkinson's Disease   -CTH showed no evidence of acute intracranial pathology  -No obvious injuries on exam, will hold off on further imaging   -Less likely infectious process (no fever/leukocytosis): UA negative, CXR personally reviewed showed no evidence of focal consolidation, follow up official read.   -Fall precaution  -PT/OT/PMR evaluation     Parkinson's Disease   -Continue home regimen: carbidopa-levodopa 25-250mg 7AM, 11AM, 3PM, 7PM, carbidopa-levodopa CR 25-100mg 12AM  -Continue entacapone 200mg at 7AM, 11AM, 3PM, 7PM,  -Continue Pramipexole 1mg TID  -Continue donepezil  -PMR eval for Parkinson's Rehab    Bilateral LE Edema  -Suspect due to dependent edema/venous insufficiency   -LE duplex negative for DVT  -No evidence of CHF on exam, CXR personally reviewed: unremarkable, can check pro-BNP   -Trial of Lasix 20mg QD x 3 days for symptomatic relief w/ close monitoring of renal function and electrolytes  -Encourage leg elevation   -Can try ACE if no improvement    HTN/HLD  CAD s/p PCI/CABG x 2 (Last 8 years ago)  -Patient denies cardiac complaints  -CXR personally reviewed showed no focal consolidation/effusions, follow up official read. Follow up pro-BNP  -Echo done 6/2024 - can try to obtain record from Dr. Cervantes office in AM (office closed today)  -Continue aspirin, metoprolol, rosuvastatin, niacin  -Outpatient follow up with cardiology     Hyperkalemia   -Suspect specimen hemolyzed  -Will repeat K   -Monitor on telemetry for now   -Further management pending repeat K    BPH  -Continue tamsulosin     DVT PPx  -Lovenox SC     Plan of care discussed with patient and his wife Naga at bedside, they are in agreement, all questions were answered

## 2024-11-04 NOTE — ED ADULT NURSE NOTE - OBJECTIVE STATEMENT
74 yr old male to ED from home BIB wife for complaints of multiple falls last week. Per wife patient fell last night. Denies LOC. Patient denies chest pain, shortness of breath, reports swelling to lower extremity x 2 weeks. Patient with history of Parkinson's. Comfort care and safety provided.
4-year-old female no significant past medical history presents to the pediatric ED with her mother concern for an abscess on her buttock.  Patient's mother stated that the patient complained to her about pain after she used the bathroom the previous day but now the pain has progressed and is bothering her to walk.  Mother denied any fever, chills, URI symptoms, sick contacts, trauma, recent travel, rash, nausea, vomiting, diarrhea, constipation, abdominal pain, urinary symptoms.

## 2024-11-05 ENCOUNTER — TRANSCRIPTION ENCOUNTER (OUTPATIENT)
Age: 74
End: 2024-11-05

## 2024-11-05 LAB
ALBUMIN SERPL ELPH-MCNC: 3.5 G/DL — SIGNIFICANT CHANGE UP (ref 3.3–5)
ALP SERPL-CCNC: 69 U/L — SIGNIFICANT CHANGE UP (ref 40–120)
ALT FLD-CCNC: 9 U/L — LOW (ref 10–45)
ANION GAP SERPL CALC-SCNC: 8 MMOL/L — SIGNIFICANT CHANGE UP (ref 5–17)
AST SERPL-CCNC: 19 U/L — SIGNIFICANT CHANGE UP (ref 10–40)
BILIRUB SERPL-MCNC: 1.4 MG/DL — HIGH (ref 0.2–1.2)
BUN SERPL-MCNC: 21 MG/DL — SIGNIFICANT CHANGE UP (ref 7–23)
CALCIUM SERPL-MCNC: 8.8 MG/DL — SIGNIFICANT CHANGE UP (ref 8.4–10.5)
CHLORIDE SERPL-SCNC: 106 MMOL/L — SIGNIFICANT CHANGE UP (ref 96–108)
CO2 SERPL-SCNC: 26 MMOL/L — SIGNIFICANT CHANGE UP (ref 22–31)
CREAT SERPL-MCNC: 0.94 MG/DL — SIGNIFICANT CHANGE UP (ref 0.5–1.3)
EGFR: 84 ML/MIN/1.73M2 — SIGNIFICANT CHANGE UP
GLUCOSE SERPL-MCNC: 94 MG/DL — SIGNIFICANT CHANGE UP (ref 70–99)
HCT VFR BLD CALC: 38.4 % — LOW (ref 39–50)
HGB BLD-MCNC: 13.2 G/DL — SIGNIFICANT CHANGE UP (ref 13–17)
MCHC RBC-ENTMCNC: 28.1 PG — SIGNIFICANT CHANGE UP (ref 27–34)
MCHC RBC-ENTMCNC: 34.4 G/DL — SIGNIFICANT CHANGE UP (ref 32–36)
MCV RBC AUTO: 81.9 FL — SIGNIFICANT CHANGE UP (ref 80–100)
NRBC # BLD: 0 /100 WBCS — SIGNIFICANT CHANGE UP (ref 0–0)
PLATELET # BLD AUTO: 129 K/UL — LOW (ref 150–400)
POTASSIUM SERPL-MCNC: 3.9 MMOL/L — SIGNIFICANT CHANGE UP (ref 3.5–5.3)
POTASSIUM SERPL-SCNC: 3.9 MMOL/L — SIGNIFICANT CHANGE UP (ref 3.5–5.3)
PROT SERPL-MCNC: 6.6 G/DL — SIGNIFICANT CHANGE UP (ref 6–8.3)
RBC # BLD: 4.69 M/UL — SIGNIFICANT CHANGE UP (ref 4.2–5.8)
RBC # FLD: 15.4 % — HIGH (ref 10.3–14.5)
SODIUM SERPL-SCNC: 140 MMOL/L — SIGNIFICANT CHANGE UP (ref 135–145)
WBC # BLD: 6.72 K/UL — SIGNIFICANT CHANGE UP (ref 3.8–10.5)
WBC # FLD AUTO: 6.72 K/UL — SIGNIFICANT CHANGE UP (ref 3.8–10.5)

## 2024-11-05 RX ADMIN — Medication 0.4 MILLIGRAM(S): at 21:01

## 2024-11-05 RX ADMIN — Medication 81 MILLIGRAM(S): at 21:01

## 2024-11-05 RX ADMIN — PRAMIPEXOLE DIHYDROCHLORIDE 1 MILLIGRAM(S): 0.12 TABLET ORAL at 13:50

## 2024-11-05 RX ADMIN — PRAMIPEXOLE DIHYDROCHLORIDE 1 MILLIGRAM(S): 0.12 TABLET ORAL at 21:01

## 2024-11-05 RX ADMIN — Medication 1 TABLET(S): at 00:31

## 2024-11-05 RX ADMIN — ENTACAPONE 200 MILLIGRAM(S): 200 TABLET, FILM COATED ORAL at 15:01

## 2024-11-05 RX ADMIN — PRAMIPEXOLE DIHYDROCHLORIDE 1 MILLIGRAM(S): 0.12 TABLET ORAL at 06:31

## 2024-11-05 RX ADMIN — Medication 1 TABLET(S): at 23:58

## 2024-11-05 RX ADMIN — Medication 20 MILLIGRAM(S): at 06:31

## 2024-11-05 RX ADMIN — Medication 1 TABLET(S): at 18:57

## 2024-11-05 RX ADMIN — ENTACAPONE 200 MILLIGRAM(S): 200 TABLET, FILM COATED ORAL at 10:58

## 2024-11-05 RX ADMIN — Medication 1 TABLET(S): at 06:46

## 2024-11-05 RX ADMIN — Medication 1 TABLET(S): at 10:58

## 2024-11-05 RX ADMIN — Medication 500 MILLIGRAM(S): at 21:01

## 2024-11-05 RX ADMIN — DONEPEZIL HYDROCHLORIDE 10 MILLIGRAM(S): 23 TABLET ORAL at 21:01

## 2024-11-05 RX ADMIN — Medication 40 MILLIGRAM(S): at 06:31

## 2024-11-05 RX ADMIN — ENTACAPONE 200 MILLIGRAM(S): 200 TABLET, FILM COATED ORAL at 18:57

## 2024-11-05 RX ADMIN — Medication 100 MILLIGRAM(S): at 06:32

## 2024-11-05 RX ADMIN — Medication 1 TABLET(S): at 15:01

## 2024-11-05 RX ADMIN — Medication 10 MILLIGRAM(S): at 21:01

## 2024-11-05 RX ADMIN — ENTACAPONE 200 MILLIGRAM(S): 200 TABLET, FILM COATED ORAL at 06:46

## 2024-11-05 NOTE — PROGRESS NOTE ADULT - SUBJECTIVE AND OBJECTIVE BOX
HPI:  74 year old male with past medical history of HTN, HLD, CAD s/p PCI/CABG x 2 (last 8 years ago), BPH, Parkinson's Disease (dx 8 years ago, follows with Dr. Claudio) who presents from home for frequent falls and hallucinations.   Patient is AAO x 3, wife at bedside provided collateral informations.   Patient lives at home with his wife, at baseline he ambulates with a walker and is able to perform his ADL's.   He's had history of occasional falls with increased frequency over the last 1-2 weeks (2-3 times this week) attributed to gait instability due to stiffness and feeling "frozen". He denies head trauma/LOC/LH/dizziness/HA/CP/SOB/palpitation prior to fall. No reported injury.   He denies recent illness/fever/chills. No cough or new urinary complaints. No new medications.  Due to these falls, he has been more immobile and over the weekend, family noted increased LE edema for which he takes PRN lasix. He denies chest pain/SOB/orthopnea.   Patient also endorses increase hallucination episodes (e.g. snakes in the house, having boots on when he didn't)  He came in today for evaluation for the [facility].     In the ED, he was afebrile and hemodynamically stable.  Labs notable for K 5.8 (suspect hemolyzed), BUN/Cr 32/0.95.   CTH is negative for acute intracranial pathology.   LE duplex negative for DVT.  He given IVF.    He is being admitted for further management.     Overnight events: None  Interval Events: Patient seen and examined at bedside. States that he has not had any hallucinations recently, but gets them sometimes. No head trauma with falls and were due to balance issues and instability, no symptoms of syncope. No other complaints at this time.     REVIEW OF SYSTEMS:  CONSTITUTIONAL: No weakness, fevers or chills  EYES/ENT: No visual changes;  No vertigo or throat pain   NECK: No pain or stiffness  RESPIRATORY: No cough, wheezing, hemoptysis; No shortness of breath  CARDIOVASCULAR: No chest pain or palpitations  GASTROINTESTINAL: No abdominal or epigastric pain. No nausea, vomiting, or hematemesis; No diarrhea or constipation. No melena or hematochezia.  GENITOURINARY: No dysuria, frequency or hematuria  NEUROLOGICAL: No numbness or weakness, no hallucinations  SKIN: No itching, burning, rashes, or lesions   All other review of systems is negative unless indicated above.    OBJECTIVE:  Vital Signs Last 24 Hrs  T(C): 36.8 (05 Nov 2024 04:50), Max: 36.8 (04 Nov 2024 11:42)  T(F): 98.3 (05 Nov 2024 04:50), Max: 98.3 (04 Nov 2024 11:42)  HR: 56 (05 Nov 2024 08:51) (56 - 68)  BP: 145/76 (05 Nov 2024 08:51) (145/76 - 185/74)  BP(mean): 108 (05 Nov 2024 04:50) (108 - 112)  RR: 17 (05 Nov 2024 04:50) (17 - 18)  SpO2: 97% (05 Nov 2024 08:51) (97% - 99%)    O2 Parameters below as of 05 Nov 2024 08:51  Patient On (Oxygen Delivery Method): room air      11-04 @ 07:01 - 11-05 @ 07:00  --------------------------------------------------------  IN: 0 mL / OUT: 400 mL / NET: -400 mL    11-05 @ 07:01  -  11-05 @ 11:17  --------------------------------------------------------  IN: 120 mL / OUT: 0 mL / NET: 120 mL      CAPILLARY BLOOD GLUCOSE    PHYSICAL EXAM:  General: NAD, lying in bed comfortably  Neurology: A&Ox3, moves all extremities spontaneously  ENT/Neck: Neck supple, trachea midline, No JVD, Gross hearing intact  Respiratory: CTA B/L, No wheezing, rales, rhonchi  CV: RRR, +S1/S2, -S3/S4, no murmurs, rubs or gallops  Abdominal: Soft, Nontender, Nondistended, +Bowel sounds x 4  MSK: 5/5 strength UE/LE bilaterally  Extremities: +pitting edema b/l with blister noted on Left lower extremity, no ulcers or skin discolorations noted.     HOSPITAL MEDICATIONS:  MEDICATIONS  (STANDING):  aspirin enteric coated 81 milliGRAM(s) Oral at bedtime  carbidopa/levodopa  25/250 1 Tablet(s) Oral <User Schedule>  carbidopa/levodopa CR 25/100 1 Tablet(s) Oral every 24 hours  donepezil 10 milliGRAM(s) Oral at bedtime  enoxaparin Injectable 40 milliGRAM(s) SubCutaneous every 24 hours  entacapone 200 milliGRAM(s) Oral <User Schedule>  furosemide    Tablet 20 milliGRAM(s) Oral daily  metoprolol succinate  milliGRAM(s) Oral daily  niacin  milliGRAM(s) Oral at bedtime  pramipexole 1 milliGRAM(s) Oral three times a day  rosuvastatin 10 milliGRAM(s) Oral at bedtime  tamsulosin 0.4 milliGRAM(s) Oral at bedtime    MEDICATIONS  (PRN):  acetaminophen     Tablet .. 650 milliGRAM(s) Oral every 6 hours PRN Temp greater or equal to 38C (100.4F), Mild Pain (1 - 3)  aluminum hydroxide/magnesium hydroxide/simethicone Suspension 30 milliLiter(s) Oral every 4 hours PRN Dyspepsia  melatonin 3 milliGRAM(s) Oral at bedtime PRN Insomnia  ondansetron Injectable 4 milliGRAM(s) IV Push every 8 hours PRN Nausea and/or Vomiting      LABS:                        13.2   6.72  )-----------( 129      ( 05 Nov 2024 07:05 )             38.4       140  |  106  |  21  ----------------------------<  94  3.9   |  26  |  0.94    Ca    8.8      05 Nov 2024 07:05    TPro  6.6  /  Alb  3.5  /  TBili  1.4[H]  /  DBili  x   /  AST  19  /  ALT  9[L]  /  AlkPhos  69  11-05      Urinalysis Basic - ( 05 Nov 2024 07:05 )    Color: x / Appearance: x / SG: x / pH: x  Gluc: 94 mg/dL / Ketone: x  / Bili: x / Urobili: x   Blood: x / Protein: x / Nitrite: x   Leuk Esterase: x / RBC: x / WBC x   Sq Epi: x / Non Sq Epi: x / Bacteria: x        MICROBIOLOGY:     Urinalysis with Rflx Culture (collected 04 Nov 2024 16:07)    RADIOLOGY: All personally reviewed.     CHEST X-RAY 11/04/2024  IMPRESSION:  No radiographic evidence of active chest disease..  ==================  CT HEAD NONCONTRAST 11/04/2024  IMPRESSION:  1)  generalized volume loss andinvolutional change, with no acute   abnormality suggested.  2)  no intracerebral hemorrhage, contusion, or hemorrhagic collections   identified.  ======================  LOWER EXTREMITY B/L VENOUS DOPPLER 11/04/2024  IMPRESSION:  No evidence of deep venous thrombosis in either lower extremity.

## 2024-11-05 NOTE — DISCHARGE NOTE PROVIDER - HOSPITAL COURSE
HPI:  74 year old male with past medical history of HTN, HLD, CAD s/p PCI/CABG x 2 (last 8 years ago), BPH, Parkinson's Disease (dx 8 years ago, follows with Dr. Claudio) who presents from home for frequent falls and hallucinations.   Patient is AAO x 3, wife at bedside provided collateral informations.   Patient lives at home with his wife, at baseline he ambulates with a walker and is able to perform his ADL's.   He's had history of occasional falls with increased frequency over the last 1-2 weeks (2-3 times this week) attributed to gait instability due to stiffness and feeling "frozen". He denies head trauma/LOC/LH/dizziness/HA/CP/SOB/palpitation prior to fall. No reported injury.   He denies recent illness/fever/chills. No cough or new urinary complaints. No new medications.  Due to these falls, he has been more immobile and over the weekend, family noted increased LE edema for which he takes PRN lasix. He denies chest pain/SOB/orthopnea.   Patient also endorses increase hallucination episodes (e.g. snakes in the house, having boots on when he didn't)  He came in today for evaluation for the [facility].     In the ED, he was afebrile and hemodynamically stable.  Labs notable for K 5.8 (suspect hemolyzed), BUN/Cr 32/0.95.   CTH is negative for acute intracranial pathology.   LE duplex negative for DVT.  He given IVF.    He is being admitted for further management.         Hospital Course Summary: Provide a brief overview of the patient’s hospital stay, highlighting major treatments, complications, and any significant clinical events.    ---  VITALS:   Vital Signs Last 24 Hrs  T(F): 98.3 (05 Nov 2024 04:50), Max: 98.3 (05 Nov 2024 04:50)  HR: 67 (05 Nov 2024 12:18) (56 - 67)  BP: 138/80 (05 Nov 2024 12:18) (138/80 - 175/80)  RR: 18 (05 Nov 2024 12:18) (17 - 18)  SpO2: 95% (05 Nov 2024 12:18) (95% - 98%)  ---  PHYSICAL EXAM:   General: Awake and alert, cooperative with exam. No acute distress.   Cardiology: Normal S1, S2. No murmurs. Regular rate and rhythm.   Respiratory: Lungs clear to ascultation bilaterally. No wheezes, rales, or rhonchi.   Gastrointestinal: Positive bowel sounds. Soft. Non-tender. Non-distended. No guarding, rigidity, or rebound tenderness.  Extremities: No peripheral edema bilaterally.  Neurological: A+Ox3. CN 2-12 intact. No focal neurological deficits. Normal speech. No facial droop.  ---    Indicate ongoing risks or concerns:    30 Day Supply through [employer] to Beds: Completed or not. If not, please provide reason why.     GOC:   • Code Status   • Summary of Goals of Care Conversation/ what matters most    Source of Infection:  Antibiotic / Last Day:    Discharging Provider:  Janette Nguyen MD   Contact Info: [phone]    Outpatient Provider: Sign out given?  SNF Provider: Sign-out given?    PLEASE COPY AND PASTE INTO CARE PLAN USING CTRL V  You came to the hospital due to:   You were diagnosed with:   You were treated with:   You were prescribed the following new medications:    You will need to follow up with your primary care physician for further management.    Discharging Provider:  Janette Nguyen MD  Contact Info: [phone] - Please call with any questions or concerns.          HPI:  74 year old male with past medical history of HTN, HLD, CAD s/p PCI/CABG x 2 (last 8 years ago), BPH, Parkinson's Disease (dx 8 years ago, follows with Dr. Claudio) who presents from home for frequent falls and hallucinations.   Patient is AAO x 3, wife at bedside provided collateral informations.   Patient lives at home with his wife, at baseline he ambulates with a walker and is able to perform his ADL's.   He's had history of occasional falls with increased frequency over the last 1-2 weeks (2-3 times this week) attributed to gait instability due to stiffness and feeling "frozen". He denies head trauma/LOC/LH/dizziness/HA/CP/SOB/palpitation prior to fall. No reported injury.   He denies recent illness/fever/chills. No cough or new urinary complaints. No new medications.  Due to these falls, he has been more immobile and over the weekend, family noted increased LE edema for which he takes PRN lasix. He denies chest pain/SOB/orthopnea.   Patient also endorses increase hallucination episodes (e.g. snakes in the house, having boots on when he didn't)  He came in today for evaluation for the [facility].     In the ED, he was afebrile and hemodynamically stable.  Labs notable for K 5.8 (suspect hemolyzed), BUN/Cr 32/0.95.   CTH is negative for acute intracranial pathology.   LE duplex negative for DVT.  He given IVF.    He is being admitted for further management.     Hospital Course Summary: Provide a brief overview of the patient’s hospital stay, highlighting major treatments, complications, and any significant clinical events.    ---  VITALS:   Vital Signs Last 24 Hrs  T(F): 98.3 (05 Nov 2024 04:50), Max: 98.3 (05 Nov 2024 04:50)  HR: 67 (05 Nov 2024 12:18) (56 - 67)  BP: 138/80 (05 Nov 2024 12:18) (138/80 - 175/80)  RR: 18 (05 Nov 2024 12:18) (17 - 18)  SpO2: 95% (05 Nov 2024 12:18) (95% - 98%)  ---  PHYSICAL EXAM:   General: Awake and alert, cooperative with exam. No acute distress.   Cardiology: Normal S1, S2. No murmurs. Regular rate and rhythm.   Respiratory: Lungs clear to ascultation bilaterally. No wheezes, rales, or rhonchi.   Gastrointestinal: Positive bowel sounds. Soft. Non-tender. Non-distended. No guarding, rigidity, or rebound tenderness.  Extremities: No peripheral edema bilaterally.  Neurological: A+Ox3. CN 2-12 intact. No focal neurological deficits. Normal speech. No facial droop.  ---    Indicate ongoing risks or concerns:    30 Day Supply through [employer] to Beds: Completed or not. If not, please provide reason why.     GOC:   • Code Status   • Summary of Goals of Care Conversation/ what matters most    Source of Infection:  Antibiotic / Last Day:    Discharging Provider:  Janette Nguyen MD   Contact Info: [phone]    Outpatient Provider: Sign out given?  SNF Provider: Sign-out given?    PLEASE COPY AND PASTE INTO CARE PLAN USING CTRL V  You came to the hospital due to:   You were diagnosed with:   You were treated with:   You were prescribed the following new medications:    You will need to follow up with your primary care physician for further management.    Discharging Provider:  Janette Nguyen MD  Contact Info: [phone] - Please call with any questions or concerns.          HPI:  74 year old male with past medical history of HTN, HLD, CAD s/p PCI/CABG x 2 (last 8 years ago), BPH, Parkinson's Disease (dx 8 years ago, follows with Dr. Claudio) who presents from home for frequent falls and hallucinations.   Patient is AAO x 3, wife at bedside provided collateral informations.   Patient lives at home with his wife, at baseline he ambulates with a walker and is able to perform his ADL's.   He's had history of occasional falls with increased frequency over the last 1-2 weeks (2-3 times this week) attributed to gait instability due to stiffness and feeling "frozen". He denies head trauma/LOC/LH/dizziness/HA/CP/SOB/palpitation prior to fall. No reported injury.   He denies recent illness/fever/chills. No cough or new urinary complaints. No new medications.  Due to these falls, he has been more immobile and over the weekend, family noted increased LE edema for which he takes PRN lasix. He denies chest pain/SOB/orthopnea.   Patient also endorses increase hallucination episodes (e.g. snakes in the house, having boots on when he didn't)  He came in today for evaluation for the [facility].     In the ED, he was afebrile and hemodynamically stable.  Labs notable for K 5.8 (suspect hemolyzed), BUN/Cr 32/0.95.   CTH is negative for acute intracranial pathology.   LE duplex negative for DVT.  He was given IVF.    He is being admitted for further management.     Hospital Course Summary: Provide a brief overview of the patient’s hospital stay, highlighting major treatments, complications, and any significant clinical events.    ---  VITALS:   Vital Signs Last 24 Hrs  T(F): 98.3 (05 Nov 2024 04:50), Max: 98.3 (05 Nov 2024 04:50)  HR: 67 (05 Nov 2024 12:18) (56 - 67)  BP: 138/80 (05 Nov 2024 12:18) (138/80 - 175/80)  RR: 18 (05 Nov 2024 12:18) (17 - 18)  SpO2: 95% (05 Nov 2024 12:18) (95% - 98%)  ---  PHYSICAL EXAM:   General: Awake and alert, cooperative with exam. No acute distress.   Cardiology: Normal S1, S2. No murmurs. Regular rate and rhythm.   Respiratory: Lungs clear to ascultation bilaterally. No wheezes, rales, or rhonchi.   Gastrointestinal: Positive bowel sounds. Soft. Non-tender. Non-distended. No guarding, rigidity, or rebound tenderness.  Extremities: No peripheral edema bilaterally.  Neurological: A+Ox3. CN 2-12 intact. No focal neurological deficits. Normal speech. No facial droop.  ---    Indicate ongoing risks or concerns:    30 Day Supply through [employer] to Beds: Completed or not. If not, please provide reason why.     GOC:   • Code Status   • Summary of Goals of Care Conversation/ what matters most    Source of Infection:  Antibiotic / Last Day:    Discharging Provider:  Janette Nguyen MD   Contact Info: [phone]    Outpatient Provider: Sign out given?  SNF Provider: Sign-out given?    PLEASE COPY AND PASTE INTO CARE PLAN USING CTRL V  You came to the hospital due to:   You were diagnosed with:   You were treated with:   You were prescribed the following new medications:    You will need to follow up with your primary care physician for further management.    Discharging Provider:  Janette Nguyen MD  Contact Info: [phone] - Please call with any questions or concerns.          74 year old male with past medical history of HTN, HLD, CAD s/p PCI/CABG x 2 (last 8 years ago), BPH, Parkinson's Disease (dx 8 years ago, follows with Dr. Claudio) who presented from home for frequent falls and hallucinations. Patient is AAO x 3, wife at bedside provided collateral informations. Pt with history of occasional falls with increased frequency over the last 1-2 weeks (2-3 times this week) attributed to gait instability due to stiffness and feeling "frozen". Due to these falls, he has been more immobile and over the weekend, family noted increased LE edema for which he takes PRN lasix. Patient also endorses increase hallucination episodes (e.g. snakes in the house, having boots on when he didn't). Pt would like evaluation for the [facility].     In the ED, he was afebrile and hemodynamically stable. Labs notable for K 5.8 (suspect hemolyzed), BUN/Cr 32/0.95.   CTH is negative for acute intracranial pathology. LE duplex negative for DVT. Pt was given IV fluids.    Hospital Course Summary:   Pt was continued on his home medications. PT, OT, and PMR consulted. Recommended inpatient acute rehab. Neurology consulted for medication optimization for Parkinson's disease. Pt given lasix for b/l LE edema.     Patient hemodynamically stable for discharge to inpatient rehab. Patient to f/u with PCP and neuro outpatient for further management.    ---  VITALS:   Vital Signs Last 24 Hrs  T(C): 36.3 (06 Nov 2024 12:30), Max: 36.6 (06 Nov 2024 04:50)  T(F): 97.3 (06 Nov 2024 12:30), Max: 97.8 (06 Nov 2024 04:50)  HR: 63 (06 Nov 2024 12:30) (59 - 73)  BP: 120/75 (06 Nov 2024 12:30) (118/75 - 180/65)  RR: 17 (06 Nov 2024 12:30) (16 - 18)  SpO2: 96% (06 Nov 2024 12:30) (95% - 99%)    Parameters below as of 06 Nov 2024 12:30  Patient On (Oxygen Delivery Method): room air      ---  PHYSICAL EXAM:   General: NAD, lying in bed comfortably  Neurology: A&Ox3, moves all extremities spontaneously  ENT/Neck: Neck supple, trachea midline, No JVD, Gross hearing intact  Respiratory: CTA B/L, No wheezing, rales, rhonchi  CV: RRR, +S1/S2, -S3/S4, no murmurs, rubs or gallops  Abdominal: Soft, Nontender, Nondistended, +Bowel sounds x 4  MSK: 5/5 strength UE/LE bilaterally  Extremities: +pitting edema b/l with blister noted on Left lower extremity, no ulcers or skin discolorations noted.     ---    Indicate ongoing risks or concerns:    30 Day Supply through [employer] to Beds: no new medications    GOC:   • Code Status Full Code  • Summary of Goals of Care Conversation/ what matters most: to get better at rehab    Discharging Provider:  Janette Nguyen MD   Contact Info: [phone]    Outpatient Provider: Sign out given?  SNF Provider: Sign-out given?     74 year old male with past medical history of HTN, HLD, CAD s/p PCI/CABG x 2 (last 8 years ago), BPH, Parkinson's Disease (dx 8 years ago, follows with Dr. Claudio) who presented from home for frequent falls and hallucinations. Pt with history of occasional falls with increased frequency over the last 1-2 weeks attributed to gait instability due to stiffness and feeling "frozen". Due to these falls, he has been more immobile and over the weekend, family noted increased LE edema for which he takes PRN lasix. Patient also endorses increase hallucination episodes. Pt also would like evaluation for the [facility]. Patient was admitted for further management.     In the ED, he was afebrile and hemodynamically stable. Labs notable for K 5.8 (suspect hemolyzed), BUN/Cr 32/0.95. Potassium normalized.   CTH is negative for acute intracranial pathology. LE duplex negative for DVT. Chest x-ray negative for active cardiopulmonary disease. Pt was given IV fluids.  Pt also given Lasix x 3 days for b/l lower extremity edema.   Pt was continued on his home medications. PT, OT, and PMR consulted. Recommended inpatient acute rehab. Neurology consulted for medication optimization for Parkinson's disease.     Patient hemodynamically stable for discharge to inpatient rehab. Patient to f/u with PCP and neuro outpatient for further management.    ---  Vital Signs Last 24 Hrs  T(C): 36.3 (08 Nov 2024 11:33), Max: 36.6 (08 Nov 2024 05:30)  T(F): 97.3 (08 Nov 2024 11:33), Max: 97.9 (08 Nov 2024 05:30)  HR: 65 (08 Nov 2024 11:33) (65 - 73)  BP: 119/73 (08 Nov 2024 11:33) (119/73 - 158/84)  RR: 17 (08 Nov 2024 11:33) (16 - 17)  SpO2: 97% (08 Nov 2024 11:33) (96% - 97%)    Parameters below as of 08 Nov 2024 11:33  Patient On (Oxygen Delivery Method): room air    ---  PHYSICAL EXAM:   General: NAD, lying in bed comfortably  Neurology: A&Ox3, moves all extremities spontaneously  ENT/Neck: Neck supple, trachea midline, No JVD, Gross hearing intact  Respiratory: CTA B/L, No wheezing, rales, rhonchi  CV: RRR, +S1/S2, -S3/S4, no murmurs, rubs or gallops  Abdominal: Soft, Nontender, Nondistended, +Bowel sounds x 4  MSK: 5/5 strength UE/LE bilaterally  Extremities: +pitting edema b/l with draining blister noted on left lower extremity. Peripheral pulses intact.   ---  Indicate ongoing risks or concerns: None    30 Day Supply through [facility] to Beds: no new medications    GOC:   • Code Status: Full Code  • Summary of Goals of Care Conversation/ what matters most: to get better at rehab     Discharging Provider:  Hayley Warren MD   Contact Info: [phone]    Outpatient Provider: Dr. Javier  [facility] Provider: Inpatient rehab

## 2024-11-05 NOTE — DISCHARGE NOTE PROVIDER - PROVIDER TOKENS
PROVIDER:[TOKEN:[[id number]]] PROVIDER:[TOKEN:[[id number]],FOLLOWUP:[2 weeks]],PROVIDER:[TOKEN:[[id number]],FOLLOWUP:[2 weeks]]

## 2024-11-05 NOTE — CONSULT NOTE ADULT - SUBJECTIVE AND OBJECTIVE BOX
74yM was admitted on 11-04      Patient is a 74y old  Male who presents with a chief complaint of Frequent Falls, Hallucinations (05 Nov 2024 11:16)    HPI:  74 year old male with past medical history of HTN, HLD, CAD s/p PCI/CABG x 2 (last 8 years ago), BPH, Parkinson's Disease (dx 8 years ago, follows with Dr. Claudio) who presents from home for frequent falls and hallucinations.   Patient is AAO x 3, wife at bedside provided collateral informations.   Patient lives at home with his wife, at baseline he ambulates with a walker and is able to perform his ADL's.   He's had history of occasional falls with increased frequency over the last 1-2 weeks (2-3 times this week) attributed to gait instability due to stiffness and feeling "frozen". He denies head trauma/LOC/LH/dizziness/HA/CP/SOB/palpitation prior to fall. No reported injury.   He denies recent illness/fever/chills. No cough or new urinary complaints. No new medications.  Due to these falls, he has been more immobile and over the weekend, family noted increased LE edema for which he takes PRN lasix. He denies chest pain/SOB/orthopnea.   Patient also endorses increase hallucination episodes (e.g. snakes in the house, having boots on when he didn't)  He came in today for evaluation for the Parkinson's Rehab.     In the ED, he was afebrile and hemodynamically stable.  Labs notable for K 5.8 (suspect hemolyzed), BUN/Cr 32/0.95.   CTH is negative for acute intracranial pathology.   LE duplex negative for DVT.  He given IVF.    He is being admitted for further management.    (04 Nov 2024 15:54)    Patient seen sitting in the chair at bedside, wife is present. Patient is hard of hearing, does not have hearing aides in at time of evaluation. Per wife, patient has fallen 3 times in the past month and notes he is on multiple medications. She is hoping he can go to parkinson's rehab for therapy. When asked about his hallucinations, he states he felt like the floor was moving. Denies dizziness or feeling unbalanced.     Imaging performed:  11/4 CT head-   1)  generalized volume loss and involutional change, with no acute   abnormality suggested.  2)  no intracerebral hemorrhage, contusion, or hemorrhagic collections   identified.    11/4 Doppler LE-   No evidence of deep venous thrombosis in either lower extremity.    11/4 Chest X ray- clear    REVIEW OF SYSTEMS  Constitutional: No fever, + fatigue  Cardio: No chest pain, No palpitations  Resp: No SOB, no respiratory distress   Neuro: No headaches +weakness  +hard of hearing    VITALS  T(C): 36.8 (11-05-24 @ 04:50), Max: 36.8 (11-05-24 @ 04:50)  HR: 67 (11-05-24 @ 12:18) (56 - 67)  BP: 138/80 (11-05-24 @ 12:18) (138/80 - 175/80)  RR: 18 (11-05-24 @ 12:18) (17 - 18)  SpO2: 95% (11-05-24 @ 12:18) (95% - 98%)  PAST MEDICAL & SURGICAL HISTORY  H/O Parkinson's disease    CAD (coronary artery disease)    History of BPH    S/P CABG (coronary artery bypass graft)        FUNCTIONAL HISTORY  Lives with wife in house, 5 steps to enter, chair lift to 2nd floor where bedroom is  Independent with RW, wife assists patient with adls     CURRENT FUNCTIONAL STATUS  11/4 PT    Manual Muscle Testing:   · Manual Muscle Testing Results	3+/5t/o     Bed Mobility: Rolling/Turning:     · Level of [place]	moderate assist (50% patients effort)  · Physical Assist/Nonphysical Assist	2 person assist    Bed Mobility: Scooting/Bridging:     · Level of [place]	moderate assist (50% patients effort)  · Physical Assist/Nonphysical Assist	1 person + 1 person to manage equipment    Transfer: Sit to Stand:     · Level of [place]	moderate assist (50% patients effort)  · Physical Assist/Nonphysical Assist	2 person assist  · Weight-Bearing Restrictions	full weight-bearing  · Assistive Device	rolling walker    Gait Skills:     · Level of [place]	moderate assist (50% patients effort)  · Physical Assist/Nonphysical Assist	2 person assist  · Weight-Bearing Restrictions	full weight-bearing  · Assistive Device	rolling walker  · Gait Distance	5 feet; bed to chair    11/5 OT    Bathing Training:     · Level of [place]	moderate assist (50% patients effort)  · Physical Assist/Nonphysical Assist	2 person assist    Upper Body Dressing Training:     · Level of [place]	moderate assist (50% patients effort)  · Physical Assist/Nonphysical Assist	2 person assist    Lower Body Dressing Training:     · Level of [place]	moderate assist (50% patients effort)  · Physical Assist/Nonphysical Assist	2 person assist    Toilet Hygiene Training:     · Level of [place]	moderate assist (50% patients effort)  · Physical Assist/Nonphysical Assist	2 person assist    Grooming Training:     · Level of [place]	minimum assist (75% patients effort)  · Physical Assist/Nonphysical Assist	1 person assist        SOCIAL HISTORY - as per documentation/history  Smoking - None  EtOH - None  Drugs - None    FAMILY HISTORY   FH: leukemia (Father)    FH: CAD (coronary artery disease) (Mother)        RECENT LABS - Reviewed  CBC Full  -  ( 05 Nov 2024 07:05 )  WBC Count : 6.72 K/uL  RBC Count : 4.69 M/uL  Hemoglobin : 13.2 g/dL  Hematocrit : 38.4 %  Platelet Count - Automated : 129 K/uL  Mean Cell Volume : 81.9 fl  Mean Cell Hemoglobin : 28.1 pg  Mean Cell Hemoglobin Concentration : 34.4 g/dL  Auto Neutrophil # : x  Auto Lymphocyte # : x  Auto Monocyte # : x  Auto Eosinophil # : x  Auto Basophil # : x  Auto Neutrophil % : x  Auto Lymphocyte % : x  Auto Monocyte % : x  Auto Eosinophil % : x  Auto Basophil % : x    11-05    140  |  106  |  21  ----------------------------<  94  3.9   |  26  |  0.94    Ca    8.8      05 Nov 2024 07:05    TPro  6.6  /  Alb  3.5  /  TBili  1.4[H]  /  DBili  x   /  AST  19  /  ALT  9[L]  /  AlkPhos  69  11-05    Urinalysis Basic - ( 05 Nov 2024 07:05 )    Color: x / Appearance: x / SG: x / pH: x  Gluc: 94 mg/dL / Ketone: x  / Bili: x / Urobili: x   Blood: x / Protein: x / Nitrite: x   Leuk Esterase: x / RBC: x / WBC x   Sq Epi: x / Non Sq Epi: x / Bacteria: x        ALLERGIES  No Known Allergies      MEDICATIONS   acetaminophen     Tablet .. 650 milliGRAM(s) Oral every 6 hours PRN  aluminum hydroxide/magnesium hydroxide/simethicone Suspension 30 milliLiter(s) Oral every 4 hours PRN  aspirin enteric coated 81 milliGRAM(s) Oral at bedtime  carbidopa/levodopa  25/250 1 Tablet(s) Oral <User Schedule>  carbidopa/levodopa CR 25/100 1 Tablet(s) Oral every 24 hours  donepezil 10 milliGRAM(s) Oral at bedtime  enoxaparin Injectable 40 milliGRAM(s) SubCutaneous every 24 hours  entacapone 200 milliGRAM(s) Oral <User Schedule>  furosemide    Tablet 20 milliGRAM(s) Oral daily  melatonin 3 milliGRAM(s) Oral at bedtime PRN  metoprolol succinate  milliGRAM(s) Oral daily  niacin  milliGRAM(s) Oral at bedtime  ondansetron Injectable 4 milliGRAM(s) IV Push every 8 hours PRN  pramipexole 1 milliGRAM(s) Oral three times a day  rosuvastatin 10 milliGRAM(s) Oral at bedtime  tamsulosin 0.4 milliGRAM(s) Oral at bedtime    ----------------------------------------------------------------------------------------  PHYSICAL EXAM  Constitutional - NAD, Comfortable  HEENT - NCAT, hard of hearing  Neck - Supple, No limited ROM  Chest - Breathing comfortably, No wheezing  Cardiovascular - S1S2   Abdomen - Soft   Extremities +bilateral LE edema R>L, +blister on Left LE   Neurologic Exam +cogwheel rigidity bilaterally, left arm with resting tremor,+ masked facies             Cognitive - AAO to self, place, and year     Communication - No dysarthria     Motor-                    LEFT    UE - ShAB 5/5, EF 5/5, EE 5/5, WE 5/5,  5/5                    RIGHT UE - ShAB 5/5, EF 5/5, EE 5/5, WE 5/5,  5/5                    LEFT    LE - HF 5/5, KE 5/5, DF 5/5, PF 5/5                    RIGHT LE - HF 5/5, KE 5/5, DF 5/5, PF 5/5     Psychiatric - Mood stable, Affect WNL  ----------------------------------------------------------------------------------------  ASSESSMENT/PLAN  74yMale admitted with multiple falls and hallucinations  Parkinson exacerbation- Frequent falls, sinemet 25/100, 25/50, Comtan 200 mg, Pramipexole  Memory- Aricept   Cardiac- Lasix, Toprol  HLD- Crestor   Pain - Tylenol  BPH- Flomax  DVT PPX -Lovenox  Rehab - Recommend ACUTE inpatient rehabilitation for the functional deficits consisting of 3 hours of therapy/day & 24 hour RN/daily PMR physician for comorbid medical management. Patient will be able to tolerate 3 hours a day. Will continue to follow and current recommendations may change if functional progress changes. Recommend ongoing mobilization by staff to maintain cardiopulmonary function and prevention of secondary complications related to immobility. Discussed with rehab team, wife and patient at bedside.

## 2024-11-05 NOTE — DISCHARGE NOTE PROVIDER - CARE PROVIDERS DIRECT ADDRESSES
[email or web address]

## 2024-11-05 NOTE — DISCHARGE NOTE PROVIDER - CARE PROVIDER_API CALL
Danilo Javier  Pulmonary Disease  [address] [place] [id number]  Phone: [phone]  Fax: [phone]  Follow Up Time:    Danilo Javier  Pulmonary Disease  [address] [place] [id number]  Phone: [phone]  Fax: [phone]  Follow Up Time: 2 weeks    Marietta Hendrix  Neurology  [address] [place] [address]  Phone: [phone]  Fax: [phone]  Follow Up Time: 2 weeks

## 2024-11-05 NOTE — DISCHARGE NOTE PROVIDER - NSDCCPCAREPLAN_GEN_ALL_CORE_FT
PRINCIPAL DISCHARGE DIAGNOSIS  Diagnosis: Parkinson's disease  Assessment and Plan of Treatment: You came to the hospital due to: gait instability and hallucinations  You were diagnosed with: Parkinson's disease  You were treated with: PT/OT/PMR. Neurology consulted.   You were recommended to continue with current home medications and inpatient rehab.  You will need to follow up with your primary care physician and neurologist for further management.  Discharging Provider:  Janette Nguyen MD  Contact Info: [phone] - Please call with any questions or concerns.        SECONDARY DISCHARGE DIAGNOSES  Diagnosis: Frequent falls  Assessment and Plan of Treatment:     Diagnosis: Lower extremity edema  Assessment and Plan of Treatment:     Diagnosis: HTN (hypertension)  Assessment and Plan of Treatment:     Diagnosis: HLD (hyperlipidemia)  Assessment and Plan of Treatment:     Diagnosis: CAD (coronary artery disease)  Assessment and Plan of Treatment:     Diagnosis: BPH (benign prostatic hyperplasia)  Assessment and Plan of Treatment:      PRINCIPAL DISCHARGE DIAGNOSIS  Diagnosis: Parkinson's disease  Assessment and Plan of Treatment: You came to the hospital due to: gait instability and hallucinations  You were diagnosed with: Parkinson's disease  You were treated with: PT/OT/PMR. Neurology consulted.   You were recommended to continue with current home medications and inpatient rehab.  You will need to follow up with your primary care physician and neurologist for further management.  -  Your health care provider has told you that you have Parkinson disease. This disease affects your brain and leads to tremors, problems with walking, movement, and coordination. Other symptoms or problems that may appear later on include difficulty swallowing, constipation, and drooling.  These medicines can cause severe side effects, including hallucinations, nausea, vomiting, diarrhea, and confusion.  Some medicines can lead to risky behaviors such as gambling.  Make sure you follow instructions. Do not stop taking medicines without first talking to your provider.  Know what to do if you miss a dose.  Keep these and all other medicines stored in a cool, dry place, away from children.  Activity and Safety  Exercise can help your muscles stay strong and help you keep your balance. It is good for your heart. Exercise may also help you sleep better and have regular bowel movements. Pace yourself when you do activities that may be tiring or need a lot of concentration.  Exercises for strength and moving around  How to use your walker, cane, or scooter  How to set up your home to safely move around in and prevent falls  Replacing shoe laces and buttons with Velcro  Getting a phone with large buttons  Bowel Care  Constipation is a common problem if you have Parkinson disease. So have a routine.   Pick a regular time, such as after a meal or a warm bath, to try to have a bowel movement.  Be patient. It may take 15 to 30 minutes to have bowel movements.      SECONDARY DISCHARGE DIAGNOSES  Diagnosis: Frequent falls  Assessment and Plan of Treatment:     Diagnosis: Lower extremity edema  Assessment and Plan of Treatment:     Diagnosis: HTN (hypertension)  Assessment and Plan of Treatment:     Diagnosis: HLD (hyperlipidemia)  Assessment and Plan of Treatment:     Diagnosis: CAD (coronary artery disease)  Assessment and Plan of Treatment:     Diagnosis: BPH (benign prostatic hyperplasia)  Assessment and Plan of Treatment:

## 2024-11-05 NOTE — DISCHARGE NOTE PROVIDER - NSDCMRMEDTOKEN_GEN_ALL_CORE_FT
aspirin 81 mg oral tablet: orally once a day  betamethasone-clotrimazole topical: 2 times a day as needed for  itching  carbidopa-levodopa 25 mg-100 mg oral tablet, extended release: 1 tab(s) orally once a day at 12AM  carbidopa-levodopa 25 mg-250 mg oral tablet: 1 tab(s) orally 4 times a day 7AM, 11AM, 3PM, 7PM  Crestor 10 mg oral tablet: 1 tab(s) orally once a day (at bedtime)  donepezil 10 mg oral tablet: 1 tab(s) orally once a day  entacapone 200 mg oral tablet: 1 tab(s) orally 4 times a day  gentamicin 0.3% ophthalmic solution: 1 drop(s) in each eye 2 times a day as needed for Redness  Lasix 40 mg oral tablet: 1 tab(s) orally once a day as needed for  LE swelling  Metoprolol Succinate  mg oral tablet, extended release: 1 tab(s) orally once a day  niacin 500 mg oral capsule, extended release: 1 cap(s) orally once a day  pramipexole 1 mg oral tablet: 1 tab(s) orally 3 times a day  tamsulosin 0.4 mg oral capsule: 1 cap(s) orally once a day (at bedtime)   aspirin 81 mg oral tablet: orally once a day  bacitracin 500 units/g topical ointment: 1 Apply topically to affected area once a day  betamethasone-clotrimazole topical: 2 times a day as needed for  itching  carbidopa-levodopa 25 mg-100 mg oral tablet, extended release: 1 tab(s) orally once a day at 12AM  carbidopa-levodopa 25 mg-250 mg oral tablet: 1 tab(s) orally 4 times a day 7AM, 11AM, 3PM, 7PM  Crestor 10 mg oral tablet: 1 tab(s) orally once a day (at bedtime)  donepezil 10 mg oral tablet: 1 tab(s) orally once a day  entacapone 200 mg oral tablet: 1 tab(s) orally 4 times a day  gentamicin 0.3% ophthalmic solution: 1 drop(s) in each eye 2 times a day as needed for Redness  Lasix 40 mg oral tablet: 1 tab(s) orally once a day as needed for  LE swelling  Metoprolol Succinate  mg oral tablet, extended release: 1 tab(s) orally once a day  niacin 500 mg oral capsule, extended release: 1 cap(s) orally once a day  polyethylene glycol 3350 oral powder for reconstitution: 17 gram(s) orally once a day As needed Constipation  pramipexole 1 mg oral tablet: 1 tab(s) orally 3 times a day  senna leaf extract oral tablet: 2 tab(s) orally once a day (at bedtime)  tamsulosin 0.4 mg oral capsule: 1 cap(s) orally once a day (at bedtime)

## 2024-11-05 NOTE — OCCUPATIONAL THERAPY INITIAL EVALUATION ADULT - ADDITIONAL COMMENTS
Pt. wife reports pt. lives in  with wife with 5STE and 1 FOS inside the home with a chair lift. Bedroom/bathroom located upstairs and 1/2 bath downstairs. Walk in shower with shower chair and grab bars. Prior pt. ambulated +RW with assist from wife. Wife also assisted with ADLs/IADLs previously. Pt. owns RW, shower chair, grab bars in the shower, w/c, and commode.

## 2024-11-05 NOTE — OCCUPATIONAL THERAPY INITIAL EVALUATION ADULT - MD ORDER
MD orders received, chart reviewed, contents noted. RN cleared for OT IE. IE completed. Please see below for findings.

## 2024-11-05 NOTE — PROGRESS NOTE ADULT - ASSESSMENT
Patient is a 74 year old male with past medical history of HTN, HLD, CAD s/p PCI/CABG x 2, BPH, and Parkinson's who presented with recurrent falls and hallucinations. Admitted for further evaluation and referral for Parkinson's rehab.     #recurrent falls  #gait instability  #hallucinations  #History of Parkinson's Disease  - admit to obs  - CT head (11/04) negative for acute intracranial abnormality.   - Chest X-ray (11/04) negative for acute cardiopulmonary disease.   - no head trauma per pt and no signs on examination  - UA negative  - fall precautions  - c/w Sinemet  - c/w entacapone  - c/w donepezil  - c/w pramiprexole  - PT/OT eval  - PMR consult placed    #bilateral lower extremity edema  - B/L lower extremity venous doppler (11/04/2024): negative for DVT  - c/w lasix 20mg po, stop after 3 days  - monitor kidney function  - elevate legs  - ACE wrap for compression prn    #thrombocytopenia  - plt 134k > 129k 11/05/2024  - monitor for now    #HTN  - c/w metoprolol 100mg    #HLD  - c/w rosuvastatin 10mg  - c/w niacin 500mg     #CAD s/p PCI/CABG x 2  - c/w ASA    #BPH  - c/w flomax    #DVT ppx  - Lovenox Patient is a 74 year old male with past medical history of HTN, HLD, CAD s/p PCI/CABG x 2, BPH, and Parkinson's who presented with recurrent falls and hallucinations. Admitted for further evaluation and referral for Parkinson's rehab.     #recurrent falls  #gait instability  #hallucinations  #History of Parkinson's Disease  - admit to obs  - CT head (11/04) negative for acute intracranial abnormality.   - Chest X-ray (11/04) negative for acute cardiopulmonary disease.   - no head trauma per pt and no signs on examination  - UA negative  - fall precautions  - c/w Sinemet  - c/w entacapone  - c/w donepezil  - c/w pramiprexole  - PT/OT eval  - PMR consult placed  - neurology consult placed - Dr. Arellano contacted via teams    #bilateral lower extremity edema  - B/L lower extremity venous doppler (11/04/2024): negative for DVT  - c/w lasix 20mg po, stop after 3 days  - monitor kidney function  - elevate legs  - ACE wrap for compression prn    #thrombocytopenia  - plt 134k > 129k 11/05/2024  - monitor for now    #HTN  - c/w metoprolol 100mg    #HLD  - c/w rosuvastatin 10mg  - c/w niacin 500mg     #CAD s/p PCI/CABG x 2  - c/w ASA    #BPH  - c/w flomax    #DVT ppx  - Lovenox    #Diet  - DASH/TLC    #GOC  - full code for now     *Case seen and discussed with Dr. Nguyen

## 2024-11-05 NOTE — OCCUPATIONAL THERAPY INITIAL EVALUATION ADULT - GENERAL OBSERVATIONS, REHAB EVAL
Pt. received supine in bed A&Ox3, forgetful at times, +PIV, +cardiac monitor, +cath agreeable to OT IE. Pt. tolerated IE well, transferring with moderate assist x2. Pt. left seated in bedside recliner with all needs met, all safety measures in place, all lines in tact, Handoff to RN

## 2024-11-05 NOTE — GOALS OF CARE CONVERSATION - ADVANCED CARE PLANNING - CONVERSATION DETAILS
I had a detailed discussion with the patient regarding their goals of care. We discussed that cardiopulmonary resuscitation (CPR) can be completed if the patient were to go into cardiac arrest. This includes chest compressions and delivering shocks if needed to restart their heart and intubation/mechanical ventilation to maintain their airway. The risks of CPR were discussed with the patient including rib fractures, damage to internal organs, and the possibility of anoxic brain injury or increased physical debility. At this time, the patient states that they are agreeable to chest compressions and intubation if needed to save their life in an emergency.    *Case discussed with Dr. Nguyen

## 2024-11-05 NOTE — OCCUPATIONAL THERAPY INITIAL EVALUATION ADULT - NSACTIVITYREC_GEN_A_OT
Recommending bed pan use on this date to ensure safety with toileting tasks   Bed<>chair with assist x2

## 2024-11-05 NOTE — OCCUPATIONAL THERAPY INITIAL EVALUATION ADULT - PERTINENT HX OF CURRENT PROBLEM, REHAB EVAL
74 year old male with past medical history of HTN, HLD, CAD s/p PCI/CABG x 2 (last 8 years ago), BPH, Parkinson's Disease (dx 8 years ago, follows with Dr. Claudio) who presents from home for frequent falls and hallucinations.   Patient is AAO x 3, wife at bedside provided collateral informations.   Patient lives at home with his wife, at baseline he ambulates with a walker and is able to perform his ADL's.   He's had history of occasional falls with increased frequency over the last 1-2 weeks (2-3 times this week) attributed to gait instability due to stiffness and feeling "frozen". He denies head trauma/LOC/LH/dizziness/HA/CP/SOB/palpitation prior to fall. No reported injury.   He denies recent illness/fever/chills. No cough or new urinary complaints. No new medications.  Due to these falls, he has been more immobile and over the weekend, family noted increased LE edema for which he takes PRN lasix. He denies chest pain/SOB/orthopnea.   Patient also endorses increase hallucination episodes (e.g. snakes in the house, having boots on when he didn't)  He came in today for evaluation for the Parkinson's Rehab.

## 2024-11-05 NOTE — DISCHARGE NOTE PROVIDER - NSDCCAREPROVSEEN_GEN_ALL_CORE_FT
Miguel Angel, Danay Lynn, Mona Nguyen, Janette Carrasco, Bandar Good, Libertad Rose, Yamila Hi, Rakel eRes Miguel Angel, Danay Lynn, Mona Nguyen, Janette Carrasco, Bandar Good, Libertad Rose, Yamila Hi, Deidra Ortiz, Rakel Warren, Hayley Hendrix, Marietta Hi, [facility]

## 2024-11-05 NOTE — PATIENT PROFILE ADULT - FALL HARM RISK - HARM RISK INTERVENTIONS
Assistance with ambulation/Assistance OOB with selected safe patient handling equipment/Communicate Risk of Fall with Harm to all staff/Discuss with provider need for PT consult/Monitor gait and stability/Reinforce activity limits and safety measures with patient and family/Tailored Fall Risk Interventions/Visual Cue: Yellow wristband and red socks/Bed in lowest position, wheels locked, appropriate side rails in place/Call bell, personal items and telephone in reach/Instruct patient to call for assistance before getting out of bed or chair/Non-slip footwear when patient is out of bed/[place] to call system/Physically safe environment - no spills, clutter or unnecessary equipment/Purposeful Proactive Rounding/Room/bathroom lighting operational, light cord in reach

## 2024-11-05 NOTE — DISCHARGE NOTE PROVIDER - NSDCFUADDAPPT_GEN_ALL_CORE_FT
APPTS ARE READY TO BE MADE: [X] YES    Best Family or Patient Contact (if needed):    Additional Information about above appointments (if needed):    1: PCP  2: Neurology  3:     Other comments or requests:    APPTS ARE READY TO BE MADE: [X] YES    Best Family or Patient Contact (if needed):    Additional Information about above appointments (if needed):    1: PCP  2: Neurology  3:     Other comments or requests:   Patient is being discharged to rehab. Patient/caregiver will arrange follow up appointments.

## 2024-11-06 DIAGNOSIS — E86.0 DEHYDRATION: ICD-10-CM

## 2024-11-06 LAB
ALBUMIN SERPL ELPH-MCNC: 3.5 G/DL — SIGNIFICANT CHANGE UP (ref 3.3–5)
ALP SERPL-CCNC: 72 U/L — SIGNIFICANT CHANGE UP (ref 40–120)
ALT FLD-CCNC: 11 U/L — SIGNIFICANT CHANGE UP (ref 10–45)
ANION GAP SERPL CALC-SCNC: 8 MMOL/L — SIGNIFICANT CHANGE UP (ref 5–17)
AST SERPL-CCNC: 19 U/L — SIGNIFICANT CHANGE UP (ref 10–40)
BILIRUB SERPL-MCNC: 1.3 MG/DL — HIGH (ref 0.2–1.2)
BUN SERPL-MCNC: 22 MG/DL — SIGNIFICANT CHANGE UP (ref 7–23)
CALCIUM SERPL-MCNC: 9 MG/DL — SIGNIFICANT CHANGE UP (ref 8.4–10.5)
CHLORIDE SERPL-SCNC: 104 MMOL/L — SIGNIFICANT CHANGE UP (ref 96–108)
CO2 SERPL-SCNC: 28 MMOL/L — SIGNIFICANT CHANGE UP (ref 22–31)
CREAT SERPL-MCNC: 1.12 MG/DL — SIGNIFICANT CHANGE UP (ref 0.5–1.3)
EGFR: 68 ML/MIN/1.73M2 — SIGNIFICANT CHANGE UP
GLUCOSE SERPL-MCNC: 96 MG/DL — SIGNIFICANT CHANGE UP (ref 70–99)
HCT VFR BLD CALC: 40.2 % — SIGNIFICANT CHANGE UP (ref 39–50)
HGB BLD-MCNC: 14 G/DL — SIGNIFICANT CHANGE UP (ref 13–17)
MAGNESIUM SERPL-MCNC: 1.9 MG/DL — SIGNIFICANT CHANGE UP (ref 1.6–2.6)
MCHC RBC-ENTMCNC: 28.6 PG — SIGNIFICANT CHANGE UP (ref 27–34)
MCHC RBC-ENTMCNC: 34.8 G/DL — SIGNIFICANT CHANGE UP (ref 32–36)
MCV RBC AUTO: 82 FL — SIGNIFICANT CHANGE UP (ref 80–100)
NRBC # BLD: 0 /100 WBCS — SIGNIFICANT CHANGE UP (ref 0–0)
PHOSPHATE SERPL-MCNC: 3.2 MG/DL — SIGNIFICANT CHANGE UP (ref 2.5–4.5)
PLATELET # BLD AUTO: 132 K/UL — LOW (ref 150–400)
POTASSIUM SERPL-MCNC: 3.9 MMOL/L — SIGNIFICANT CHANGE UP (ref 3.5–5.3)
POTASSIUM SERPL-SCNC: 3.9 MMOL/L — SIGNIFICANT CHANGE UP (ref 3.5–5.3)
PROT SERPL-MCNC: 6.9 G/DL — SIGNIFICANT CHANGE UP (ref 6–8.3)
RBC # BLD: 4.9 M/UL — SIGNIFICANT CHANGE UP (ref 4.2–5.8)
RBC # FLD: 15.2 % — HIGH (ref 10.3–14.5)
SODIUM SERPL-SCNC: 140 MMOL/L — SIGNIFICANT CHANGE UP (ref 135–145)
WBC # BLD: 7.63 K/UL — SIGNIFICANT CHANGE UP (ref 3.8–10.5)
WBC # FLD AUTO: 7.63 K/UL — SIGNIFICANT CHANGE UP (ref 3.8–10.5)

## 2024-11-06 PROCEDURE — 99233 SBSQ HOSP IP/OBS HIGH 50: CPT | Mod: GC

## 2024-11-06 RX ORDER — CLOTRIMAZOLE AND BETAMETHASONE DIPROPIONATE 10; .5 MG/G; MG/G
0 CREAM TOPICAL
Refills: 0 | DISCHARGE

## 2024-11-06 RX ORDER — GENTAMICIN SULFATE 0.3 %
1 DROPS OPHTHALMIC (EYE)
Refills: 0 | DISCHARGE

## 2024-11-06 RX ADMIN — Medication 10 MILLIGRAM(S): at 21:10

## 2024-11-06 RX ADMIN — Medication 1 TABLET(S): at 18:52

## 2024-11-06 RX ADMIN — Medication 500 MILLIGRAM(S): at 21:11

## 2024-11-06 RX ADMIN — PRAMIPEXOLE DIHYDROCHLORIDE 1 MILLIGRAM(S): 0.12 TABLET ORAL at 21:11

## 2024-11-06 RX ADMIN — Medication 0.4 MILLIGRAM(S): at 21:10

## 2024-11-06 RX ADMIN — Medication 40 MILLIGRAM(S): at 06:22

## 2024-11-06 RX ADMIN — Medication 1 TABLET(S): at 15:06

## 2024-11-06 RX ADMIN — ENTACAPONE 200 MILLIGRAM(S): 200 TABLET, FILM COATED ORAL at 11:23

## 2024-11-06 RX ADMIN — Medication 81 MILLIGRAM(S): at 21:10

## 2024-11-06 RX ADMIN — ENTACAPONE 200 MILLIGRAM(S): 200 TABLET, FILM COATED ORAL at 15:05

## 2024-11-06 RX ADMIN — PRAMIPEXOLE DIHYDROCHLORIDE 1 MILLIGRAM(S): 0.12 TABLET ORAL at 06:22

## 2024-11-06 RX ADMIN — Medication 1 TABLET(S): at 11:23

## 2024-11-06 RX ADMIN — PRAMIPEXOLE DIHYDROCHLORIDE 1 MILLIGRAM(S): 0.12 TABLET ORAL at 13:22

## 2024-11-06 RX ADMIN — DONEPEZIL HYDROCHLORIDE 10 MILLIGRAM(S): 23 TABLET ORAL at 21:10

## 2024-11-06 RX ADMIN — Medication 20 MILLIGRAM(S): at 06:22

## 2024-11-06 RX ADMIN — Medication 1 TABLET(S): at 06:22

## 2024-11-06 RX ADMIN — ENTACAPONE 200 MILLIGRAM(S): 200 TABLET, FILM COATED ORAL at 18:52

## 2024-11-06 RX ADMIN — ENTACAPONE 200 MILLIGRAM(S): 200 TABLET, FILM COATED ORAL at 06:22

## 2024-11-06 NOTE — PROGRESS NOTE ADULT - ASSESSMENT
Patient is a 74 year old male with past medical history of HTN, HLD, CAD s/p PCI/CABG x 2, BPH, and Parkinson's who presented with recurrent falls and hallucinations. Admitted for further evaluation and referral for Parkinson's rehab.     #recurrent falls  #gait instability  #hallucinations  #History of Parkinson's Disease  - CT head (11/04) negative for acute intracranial abnormality.   - Chest X-ray (11/04) negative for acute cardiopulmonary disease.   - no head trauma per pt and no signs on examination  - UA negative  - fall precautions  - c/w Sinemet  - c/w entacapone  - c/w donepezil  - c/w pramiprexole  - PT/OT & PMR recommended acute inpatient rehab  - neurology consult- Dr. Hendrix recommended inpatient rehab/ Parkinson's program  - waiting on beds    #bilateral lower extremity edema  - B/L lower extremity venous doppler (11/04/2024): negative for DVT  - c/w lasix 20mg po, stop after 3 days  - monitor kidney function  - elevate legs  - ACE wrap for compression prn    #thrombocytopenia  - plt 134k > 129k> 132k 11/06/2024  - monitor for now    #HTN  - c/w metoprolol 100mg    #HLD  - c/w rosuvastatin 10mg  - c/w niacin 500mg     #CAD s/p PCI/CABG x 2  - c/w ASA    #BPH  - c/w flomax    #DVT ppx  - Lovenox    #Diet  - DASH/TLC    #GOC  - full code for now     *Case seen and discussed with Dr. Nguyen Patient is a 74 year old male with past medical history of HTN, HLD, CAD s/p PCI/CABG x 2, BPH, and Parkinson's who presented with recurrent falls and hallucinations. Admitted for further evaluation and referral for Parkinson's rehab.     #recurrent falls  #gait instability  #hallucinations  #History of Parkinson's Disease  - CT head (11/04) negative for acute intracranial abnormality.   - Chest X-ray (11/04) negative for acute cardiopulmonary disease.   - no head trauma per pt and no signs on examination  - UA negative  - fall precautions  - c/w Sinemet  - c/w entacapone  - c/w donepezil  - c/w pramiprexole  - PT/OT & PMR recommended acute inpatient rehab  - neurology consult- Dr. Hendrix recommended inpatient rehab/ Parkinson's program  - waiting on beds    #bilateral lower extremity edema  - B/L lower extremity venous doppler (11/04/2024): negative for DVT  - c/w lasix 20mg po, stop after 3 days  - monitor kidney function  - elevate legs  - ACE wrap for compression prn    #thrombocytopenia  - plt 134k > 129k> 132k 11/06/2024  - monitor for now    #HTN  - c/w metoprolol 100mg    #HLD  - c/w rosuvastatin 10mg  - c/w niacin 500mg     #CAD s/p PCI/CABG x 2  - c/w ASA    #BPH  - c/w flomax    #DVT ppx  - Lovenox    #Diet  - DASH/TLC    #GOC  - full code     Wife, Naga, updated at bedside.     *Case seen and discussed with Dr. Nguyen

## 2024-11-06 NOTE — PROGRESS NOTE ADULT - SUBJECTIVE AND OBJECTIVE BOX
Patient is a 74y old  Male who presents with a chief complaint of Frequent Falls, Hallucinations (06 Nov 2024 13:06)      INTERVAL HPI/OVERNIGHT EVENTS: Patient seen and examined at bedside. No overnight events.    MEDICATIONS  (STANDING):  aspirin enteric coated 81 milliGRAM(s) Oral at bedtime  carbidopa/levodopa  25/250 1 Tablet(s) Oral <User Schedule>  carbidopa/levodopa CR 25/100 1 Tablet(s) Oral every 24 hours  donepezil 10 milliGRAM(s) Oral at bedtime  enoxaparin Injectable 40 milliGRAM(s) SubCutaneous every 24 hours  entacapone 200 milliGRAM(s) Oral <User Schedule>  furosemide    Tablet 20 milliGRAM(s) Oral daily  metoprolol succinate  milliGRAM(s) Oral daily  niacin  milliGRAM(s) Oral at bedtime  pramipexole 1 milliGRAM(s) Oral three times a day  rosuvastatin 10 milliGRAM(s) Oral at bedtime  tamsulosin 0.4 milliGRAM(s) Oral at bedtime    MEDICATIONS  (PRN):  acetaminophen     Tablet .. 650 milliGRAM(s) Oral every 6 hours PRN Temp greater or equal to 38C (100.4F), Mild Pain (1 - 3)  aluminum hydroxide/magnesium hydroxide/simethicone Suspension 30 milliLiter(s) Oral every 4 hours PRN Dyspepsia  melatonin 3 milliGRAM(s) Oral at bedtime PRN Insomnia  ondansetron Injectable 4 milliGRAM(s) IV Push every 8 hours PRN Nausea and/or Vomiting      Allergies    No Known Allergies    Intolerances        REVIEW OF SYSTEMS:  CONSTITUTIONAL: No fever or chills  HEENT:  No headache, no sore throat  RESPIRATORY: No cough, wheezing, or shortness of breath  CARDIOVASCULAR: No chest pain, palpitations  GASTROINTESTINAL: No abd pain, nausea, vomiting, or diarrhea  GENITOURINARY: No dysuria, frequency, or hematuria  NEUROLOGICAL: no focal weakness or dizziness  MUSCULOSKELETAL: no myalgias     Vital Signs Last 24 Hrs  T(C): 36.3 (06 Nov 2024 12:30), Max: 36.6 (06 Nov 2024 04:50)  T(F): 97.3 (06 Nov 2024 12:30), Max: 97.8 (06 Nov 2024 04:50)  HR: 63 (06 Nov 2024 12:30) (59 - 73)  BP: 120/75 (06 Nov 2024 12:30) (118/75 - 180/65)  BP(mean): --  RR: 17 (06 Nov 2024 12:30) (16 - 18)  SpO2: 96% (06 Nov 2024 12:30) (95% - 99%)    Parameters below as of 06 Nov 2024 12:30  Patient On (Oxygen Delivery Method): room air      I&O's Summary    05 Nov 2024 07:01  -  06 Nov 2024 07:00  --------------------------------------------------------  IN: 120 mL / OUT: 1500 mL / NET: -1380 mL    06 Nov 2024 07:01  -  06 Nov 2024 13:30  --------------------------------------------------------  IN: 360 mL / OUT: 500 mL / NET: -140 mL      BMI (kg/m2): 30.7 (11-05-24 @ 00:48)    PHYSICAL EXAM:  General: NAD, lying in bed comfortably  Neurology: A&Ox3, moves all extremities spontaneously  ENT/Neck: Neck supple, trachea midline, No JVD, Gross hearing intact  Respiratory: CTA B/L, No wheezing, rales, rhonchi  CV: RRR, +S1/S2, -S3/S4, no murmurs, rubs or gallops  Abdominal: Soft, Nontender, Nondistended, +Bowel sounds x 4  MSK: 5/5 strength UE/LE bilaterally  Extremities: +pitting edema b/l with blister noted on Left lower extremity, no ulcers or skin discolorations noted.       LABS: Personally reviewed  CBC                        14.0   7.63  )-----------( 132      ( 06 Nov 2024 07:07 )             40.2     CMP  11-06    140  |  104  |  22  ----------------------------<  96  3.9   |  28  |  1.12    Ca    9.0      06 Nov 2024 07:07  Phos  3.2     11-06  Mg     1.9     11-06    TPro  6.9  /  Alb  3.5  /  TBili  1.3  /  DBili  x   /  AST  19  /  ALT  11  /  AlkPhos  72  11-06                Urinalysis Basic - ( 06 Nov 2024 07:07 )    Color: x / Appearance: x / SG: x / pH: x  Gluc: 96 mg/dL / Ketone: x  / Bili: x / Urobili: x   Blood: x / Protein: x / Nitrite: x   Leuk Esterase: x / RBC: x / WBC x   Sq Epi: x / Non Sq Epi: x / Bacteria: x              Urinalysis with Rflx Culture (collected 11-04-24 @ 16:07)       Patient is a 74y old  Male who presents with a chief complaint of Frequent Falls, Hallucinations (06 Nov 2024 13:06)      INTERVAL HPI/OVERNIGHT EVENTS: Patient seen and examined at bedside. Wife at bedside. States that swelling has improved. No overnight events.    MEDICATIONS  (STANDING):  aspirin enteric coated 81 milliGRAM(s) Oral at bedtime  carbidopa/levodopa  25/250 1 Tablet(s) Oral <User Schedule>  carbidopa/levodopa CR 25/100 1 Tablet(s) Oral every 24 hours  donepezil 10 milliGRAM(s) Oral at bedtime  enoxaparin Injectable 40 milliGRAM(s) SubCutaneous every 24 hours  entacapone 200 milliGRAM(s) Oral <User Schedule>  furosemide    Tablet 20 milliGRAM(s) Oral daily  metoprolol succinate  milliGRAM(s) Oral daily  niacin  milliGRAM(s) Oral at bedtime  pramipexole 1 milliGRAM(s) Oral three times a day  rosuvastatin 10 milliGRAM(s) Oral at bedtime  tamsulosin 0.4 milliGRAM(s) Oral at bedtime    MEDICATIONS  (PRN):  acetaminophen     Tablet .. 650 milliGRAM(s) Oral every 6 hours PRN Temp greater or equal to 38C (100.4F), Mild Pain (1 - 3)  aluminum hydroxide/magnesium hydroxide/simethicone Suspension 30 milliLiter(s) Oral every 4 hours PRN Dyspepsia  melatonin 3 milliGRAM(s) Oral at bedtime PRN Insomnia  ondansetron Injectable 4 milliGRAM(s) IV Push every 8 hours PRN Nausea and/or Vomiting      Allergies    No Known Allergies    Intolerances        REVIEW OF SYSTEMS:  CONSTITUTIONAL: No fever or chills  HEENT:  No headache, no sore throat  RESPIRATORY: No cough, wheezing, or shortness of breath  CARDIOVASCULAR: No chest pain, palpitations  GASTROINTESTINAL: No abd pain, nausea, vomiting, or diarrhea  GENITOURINARY: No dysuria, frequency, or hematuria  NEUROLOGICAL: no focal weakness or dizziness  MUSCULOSKELETAL: no myalgias     Vital Signs Last 24 Hrs  T(C): 36.3 (06 Nov 2024 12:30), Max: 36.6 (06 Nov 2024 04:50)  T(F): 97.3 (06 Nov 2024 12:30), Max: 97.8 (06 Nov 2024 04:50)  HR: 63 (06 Nov 2024 12:30) (59 - 73)  BP: 120/75 (06 Nov 2024 12:30) (118/75 - 180/65)  BP(mean): --  RR: 17 (06 Nov 2024 12:30) (16 - 18)  SpO2: 96% (06 Nov 2024 12:30) (95% - 99%)    Parameters below as of 06 Nov 2024 12:30  Patient On (Oxygen Delivery Method): room air      I&O's Summary    05 Nov 2024 07:01  -  06 Nov 2024 07:00  --------------------------------------------------------  IN: 120 mL / OUT: 1500 mL / NET: -1380 mL    06 Nov 2024 07:01  -  06 Nov 2024 13:30  --------------------------------------------------------  IN: 360 mL / OUT: 500 mL / NET: -140 mL      BMI (kg/m2): 30.7 (11-05-24 @ 00:48)    PHYSICAL EXAM:  General: NAD, lying in bed comfortably  Neurology: A&Ox3, moves all extremities spontaneously  ENT/Neck: Neck supple, trachea midline, No JVD, Gross hearing intact  Respiratory: CTA B/L, No wheezing, rales, rhonchi  CV: RRR, +S1/S2, -S3/S4, no murmurs, rubs or gallops  Abdominal: Soft, Nontender, Nondistended, +Bowel sounds x 4  MSK: 5/5 strength UE/LE bilaterally  Extremities: +pitting edema b/l with blister noted on Left lower extremity, no ulcers or skin discolorations noted.       LABS: Personally reviewed  CBC                        14.0   7.63  )-----------( 132      ( 06 Nov 2024 07:07 )             40.2     CMP  11-06    140  |  104  |  22  ----------------------------<  96  3.9   |  28  |  1.12    Ca    9.0      06 Nov 2024 07:07  Phos  3.2     11-06  Mg     1.9     11-06    TPro  6.9  /  Alb  3.5  /  TBili  1.3  /  DBili  x   /  AST  19  /  ALT  11  /  AlkPhos  72  11-06        Urinalysis Basic - ( 06 Nov 2024 07:07 )    Color: x / Appearance: x / SG: x / pH: x  Gluc: 96 mg/dL / Ketone: x  / Bili: x / Urobili: x   Blood: x / Protein: x / Nitrite: x   Leuk Esterase: x / RBC: x / WBC x   Sq Epi: x / Non Sq Epi: x / Bacteria: x              Urinalysis with Rflx Culture (collected 11-04-24 @ 16:07)

## 2024-11-06 NOTE — PROGRESS NOTE ADULT - SUBJECTIVE AND OBJECTIVE BOX
CC: 74y old Male who presents with a chief complaint of Frequent Falls    Subjective/Objective: Patient seen sitting in the chair at bedside, wife is present. Patient doing better today, looking forward to starting Parkinson's rehab soon.    Imaging performed:  11/4 CT head-   1)  generalized volume loss and involutional change, with no acute   abnormality suggested.  2)  no intracerebral hemorrhage, contusion, or hemorrhagic collections   identified.    11/4 Doppler LE-   No evidence of deep venous thrombosis in either lower extremity.    11/4 Chest X ray- clear    REVIEW OF SYSTEMS  Constitutional: No fever, + fatigue  Cardio: No chest pain, No palpitations  Resp: No SOB, no respiratory distress   Neuro: No headaches +weakness  +hard of hearing    VITALS  T(C): 36.3 (11-06-24 @ 12:30)  T(F): 97.3 (11-06-24 @ 12:30), Max: 97.8 (11-06-24 @ 04:50)  HR: 63 (11-06-24 @ 12:30) (59 - 73)  BP: 120/75 (11-06-24 @ 12:30) (118/75 - 180/65)  RR:  (16 - 18)  SpO2:  (95% - 99%)      PAST MEDICAL & SURGICAL HISTORY  H/O Parkinson's disease    CAD (coronary artery disease)    History of BPH    S/P CABG (coronary artery bypass graft)        FUNCTIONAL HISTORY  Lives with wife in house, 5 steps to enter, chair lift to 2nd floor where bedroom is  Independent with RW, wife assists patient with adls     CURRENT FUNCTIONAL STATUS  11/4 PT    Manual Muscle Testing:   · Manual Muscle Testing Results	3+/5t/o     Bed Mobility: Rolling/Turning:     · Level of [place]	moderate assist (50% patients effort)  · Physical Assist/Nonphysical Assist	2 person assist    Bed Mobility: Scooting/Bridging:     · Level of [place]	moderate assist (50% patients effort)  · Physical Assist/Nonphysical Assist	1 person + 1 person to manage equipment    Transfer: Sit to Stand:     · Level of [place]	moderate assist (50% patients effort)  · Physical Assist/Nonphysical Assist	2 person assist  · Weight-Bearing Restrictions	full weight-bearing  · Assistive Device	rolling walker    Gait Skills:     · Level of [place]	moderate assist (50% patients effort)  · Physical Assist/Nonphysical Assist	2 person assist  · Weight-Bearing Restrictions	full weight-bearing  · Assistive Device	rolling walker  · Gait Distance	5 feet; bed to chair    11/6 OT    Bed Mobility  Bed Mobility Training Rolling/Turning: minimum assist (75% patient effort);  1 person assist;  bed rails  Bed Mobility Training Supine-to-Sit: minimum assist (75% patient effort);  1 person assist;  bed rails    Bed-Chair Transfer Training  Transfer Training Bed-to-Chair Transfer: minimum assist (75% patient effort);  1 person assist;  rolling walker    Sit-Stand Transfer Training  Transfer Training Sit-to-Stand Transfer: minimum assist (75% patient effort);  1 person assist;  rolling walker  Transfer Training Stand-to-Sit Transfer: minimum assist (75% patient effort);  1 person assist;  rolling walker    Lower Body Dressing Training  Lower Body Dressing Training Assistance: maximum assist (25% patient effort);  1 person assist;  Donned socks semi-supine         SOCIAL HISTORY - as per documentation/history  Smoking - None  EtOH - None  Drugs - None    FAMILY HISTORY   FH: leukemia (Father)    FH: CAD (coronary artery disease) (Mother)        RECENT LABS - Reviewed  CBC Full  -  ( 05 Nov 2024 07:05 )  WBC Count : 6.72 K/uL  RBC Count : 4.69 M/uL  Hemoglobin : 13.2 g/dL  Hematocrit : 38.4 %  Platelet Count - Automated : 129 K/uL  Mean Cell Volume : 81.9 fl  Mean Cell Hemoglobin : 28.1 pg  Mean Cell Hemoglobin Concentration : 34.4 g/dL  Auto Neutrophil # : x  Auto Lymphocyte # : x  Auto Monocyte # : x  Auto Eosinophil # : x  Auto Basophil # : x  Auto Neutrophil % : x  Auto Lymphocyte % : x  Auto Monocyte % : x  Auto Eosinophil % : x  Auto Basophil % : x    11-05    140  |  106  |  21  ----------------------------<  94  3.9   |  26  |  0.94    Ca    8.8      05 Nov 2024 07:05    TPro  6.6  /  Alb  3.5  /  TBili  1.4[H]  /  DBili  x   /  AST  19  /  ALT  9[L]  /  AlkPhos  69  11-05    Urinalysis Basic - ( 05 Nov 2024 07:05 )    Color: x / Appearance: x / SG: x / pH: x  Gluc: 94 mg/dL / Ketone: x  / Bili: x / Urobili: x   Blood: x / Protein: x / Nitrite: x   Leuk Esterase: x / RBC: x / WBC x   Sq Epi: x / Non Sq Epi: x / Bacteria: x        ALLERGIES  No Known Allergies      MEDICATIONS   MEDICATIONS  (STANDING):  aspirin enteric coated 81 milliGRAM(s) Oral at bedtime  carbidopa/levodopa  25/250 1 Tablet(s) Oral <User Schedule>  carbidopa/levodopa CR 25/100 1 Tablet(s) Oral every 24 hours  donepezil 10 milliGRAM(s) Oral at bedtime  enoxaparin Injectable 40 milliGRAM(s) SubCutaneous every 24 hours  entacapone 200 milliGRAM(s) Oral <User Schedule>  furosemide    Tablet 20 milliGRAM(s) Oral daily  metoprolol succinate  milliGRAM(s) Oral daily  niacin  milliGRAM(s) Oral at bedtime  pramipexole 1 milliGRAM(s) Oral three times a day  rosuvastatin 10 milliGRAM(s) Oral at bedtime  tamsulosin 0.4 milliGRAM(s) Oral at bedtime    MEDICATIONS  (PRN):  acetaminophen     Tablet .. 650 milliGRAM(s) Oral every 6 hours PRN Temp greater or equal to 38C (100.4F), Mild Pain (1 - 3)  aluminum hydroxide/magnesium hydroxide/simethicone Suspension 30 milliLiter(s) Oral every 4 hours PRN Dyspepsia  melatonin 3 milliGRAM(s) Oral at bedtime PRN Insomnia  ondansetron Injectable 4 milliGRAM(s) IV Push every 8 hours PRN Nausea and/or Vomiting      ----------------------------------------------------------------------------------------  PHYSICAL EXAM  Constitutional - NAD, Comfortable  HEENT - NCAT, hard of hearing  Neck - Supple, No limited ROM  Chest - Breathing comfortably, No wheezing  Cardiovascular - S1S2   Abdomen - Soft   Extremities +bilateral LE edema R>L, +blister on Left LE   Neurologic Exam +cogwheel rigidity bilaterally, left arm with resting tremor,+ masked facies             Cognitive - AAO to self, place, and year     Communication - No dysarthria     Motor-                    LEFT    UE - ShAB 5/5, EF 5/5, EE 5/5, WE 5/5,  5/5                    RIGHT UE - ShAB 5/5, EF 5/5, EE 5/5, WE 5/5,  5/5                    LEFT    LE - HF 5/5, KE 5/5, DF 5/5, PF 5/5                    RIGHT LE - HF 5/5, KE 5/5, DF 5/5, PF 5/5     Psychiatric - Mood stable, Affect WNL  ----------------------------------------------------------------------------------------  ASSESSMENT/PLAN  74yMale admitted with multiple falls and hallucinations  Parkinson exacerbation- Frequent falls, sinemet 25/100, 25/50, Comtan 200 mg, Pramipexole  Memory- Aricept   Cardiac- Lasix, Toprol  HLD- Crestor   Pain - Tylenol  BPH- Flomax  DVT PPX -Lovenox  Rehab - Recommend ACUTE inpatient rehabilitation for the functional deficits consisting of 3 hours of therapy/day & 24 hour RN/daily PMR physician for comorbid medical management. Patient will be able to tolerate 3 hours a day. Will continue to follow and current recommendations may change if functional progress changes. Recommend ongoing mobilization by staff to maintain cardiopulmonary function and prevention of secondary complications related to immobility. Discussed with rehab team, wife and patient at bedside. Patient pending bed at [facility].

## 2024-11-07 LAB
ALBUMIN SERPL ELPH-MCNC: 3.5 G/DL — SIGNIFICANT CHANGE UP (ref 3.3–5)
ALP SERPL-CCNC: 69 U/L — SIGNIFICANT CHANGE UP (ref 40–120)
ALT FLD-CCNC: 14 U/L — SIGNIFICANT CHANGE UP (ref 10–45)
ANION GAP SERPL CALC-SCNC: 6 MMOL/L — SIGNIFICANT CHANGE UP (ref 5–17)
AST SERPL-CCNC: 17 U/L — SIGNIFICANT CHANGE UP (ref 10–40)
BILIRUB SERPL-MCNC: 1.5 MG/DL — HIGH (ref 0.2–1.2)
BUN SERPL-MCNC: 23 MG/DL — SIGNIFICANT CHANGE UP (ref 7–23)
CALCIUM SERPL-MCNC: 9.4 MG/DL — SIGNIFICANT CHANGE UP (ref 8.4–10.5)
CHLORIDE SERPL-SCNC: 104 MMOL/L — SIGNIFICANT CHANGE UP (ref 96–108)
CO2 SERPL-SCNC: 29 MMOL/L — SIGNIFICANT CHANGE UP (ref 22–31)
CREAT SERPL-MCNC: 1.06 MG/DL — SIGNIFICANT CHANGE UP (ref 0.5–1.3)
EGFR: 73 ML/MIN/1.73M2 — SIGNIFICANT CHANGE UP
GLUCOSE SERPL-MCNC: 93 MG/DL — SIGNIFICANT CHANGE UP (ref 70–99)
HCT VFR BLD CALC: 42.3 % — SIGNIFICANT CHANGE UP (ref 39–50)
HGB BLD-MCNC: 14.3 G/DL — SIGNIFICANT CHANGE UP (ref 13–17)
MCHC RBC-ENTMCNC: 28.3 PG — SIGNIFICANT CHANGE UP (ref 27–34)
MCHC RBC-ENTMCNC: 33.8 G/DL — SIGNIFICANT CHANGE UP (ref 32–36)
MCV RBC AUTO: 83.8 FL — SIGNIFICANT CHANGE UP (ref 80–100)
NRBC # BLD: 0 /100 WBCS — SIGNIFICANT CHANGE UP (ref 0–0)
PLATELET # BLD AUTO: 129 K/UL — LOW (ref 150–400)
POTASSIUM SERPL-MCNC: 4.1 MMOL/L — SIGNIFICANT CHANGE UP (ref 3.5–5.3)
POTASSIUM SERPL-SCNC: 4.1 MMOL/L — SIGNIFICANT CHANGE UP (ref 3.5–5.3)
PROT SERPL-MCNC: 7.1 G/DL — SIGNIFICANT CHANGE UP (ref 6–8.3)
RBC # BLD: 5.05 M/UL — SIGNIFICANT CHANGE UP (ref 4.2–5.8)
RBC # FLD: 15.1 % — HIGH (ref 10.3–14.5)
SODIUM SERPL-SCNC: 139 MMOL/L — SIGNIFICANT CHANGE UP (ref 135–145)
WBC # BLD: 7.99 K/UL — SIGNIFICANT CHANGE UP (ref 3.8–10.5)
WBC # FLD AUTO: 7.99 K/UL — SIGNIFICANT CHANGE UP (ref 3.8–10.5)

## 2024-11-07 PROCEDURE — 99233 SBSQ HOSP IP/OBS HIGH 50: CPT | Mod: GC

## 2024-11-07 RX ORDER — SENNA 187 MG
2 TABLET ORAL AT BEDTIME
Refills: 0 | Status: DISCONTINUED | OUTPATIENT
Start: 2024-11-07 | End: 2024-11-08

## 2024-11-07 RX ORDER — POLYETHYLENE GLYCOL 3350 17 G/17G
17 POWDER, FOR SOLUTION ORAL DAILY
Refills: 0 | Status: DISCONTINUED | OUTPATIENT
Start: 2024-11-07 | End: 2024-11-08

## 2024-11-07 RX ADMIN — PRAMIPEXOLE DIHYDROCHLORIDE 1 MILLIGRAM(S): 0.12 TABLET ORAL at 06:50

## 2024-11-07 RX ADMIN — Medication 20 MILLIGRAM(S): at 06:50

## 2024-11-07 RX ADMIN — Medication 1 TABLET(S): at 06:50

## 2024-11-07 RX ADMIN — Medication 2 TABLET(S): at 21:24

## 2024-11-07 RX ADMIN — PRAMIPEXOLE DIHYDROCHLORIDE 1 MILLIGRAM(S): 0.12 TABLET ORAL at 15:10

## 2024-11-07 RX ADMIN — Medication 500 MILLIGRAM(S): at 21:17

## 2024-11-07 RX ADMIN — Medication 0.4 MILLIGRAM(S): at 21:17

## 2024-11-07 RX ADMIN — ENTACAPONE 200 MILLIGRAM(S): 200 TABLET, FILM COATED ORAL at 06:50

## 2024-11-07 RX ADMIN — Medication 10 MILLIGRAM(S): at 21:17

## 2024-11-07 RX ADMIN — Medication 1 TABLET(S): at 18:28

## 2024-11-07 RX ADMIN — PRAMIPEXOLE DIHYDROCHLORIDE 1 MILLIGRAM(S): 0.12 TABLET ORAL at 21:17

## 2024-11-07 RX ADMIN — Medication 1 TABLET(S): at 12:25

## 2024-11-07 RX ADMIN — ENTACAPONE 200 MILLIGRAM(S): 200 TABLET, FILM COATED ORAL at 18:27

## 2024-11-07 RX ADMIN — Medication 100 MILLIGRAM(S): at 06:50

## 2024-11-07 RX ADMIN — Medication 81 MILLIGRAM(S): at 21:17

## 2024-11-07 RX ADMIN — ENTACAPONE 200 MILLIGRAM(S): 200 TABLET, FILM COATED ORAL at 12:24

## 2024-11-07 RX ADMIN — Medication 40 MILLIGRAM(S): at 06:51

## 2024-11-07 RX ADMIN — DONEPEZIL HYDROCHLORIDE 10 MILLIGRAM(S): 23 TABLET ORAL at 21:18

## 2024-11-07 RX ADMIN — Medication 1 TABLET(S): at 00:00

## 2024-11-07 RX ADMIN — Medication 1 TABLET(S): at 15:11

## 2024-11-07 RX ADMIN — ENTACAPONE 200 MILLIGRAM(S): 200 TABLET, FILM COATED ORAL at 15:11

## 2024-11-07 NOTE — DIETITIAN INITIAL EVALUATION ADULT - ADD RECOMMEND
1) Recommend prune juice   2) Monitor daily PO intakes, GI tolerance, labs, weights, skin integrity, & BM regularity

## 2024-11-07 NOTE — DIETITIAN INITIAL EVALUATION ADULT - PERTINENT LABORATORY DATA
11-07    139  |  104  |  23  ----------------------------<  93  4.1   |  29  |  1.06    Ca    9.4      07 Nov 2024 07:52  Phos  3.2     11-06  Mg     1.9     11-06    TPro  7.1  /  Alb  3.5  /  TBili  1.5[H]  /  DBili  x   /  AST  17  /  ALT  14  /  AlkPhos  69  11-07

## 2024-11-07 NOTE — DIETITIAN INITIAL EVALUATION ADULT - PERTINENT MEDS FT
MEDICATIONS  (STANDING):  aspirin enteric coated 81 milliGRAM(s) Oral at bedtime  carbidopa/levodopa  25/250 1 Tablet(s) Oral <User Schedule>  carbidopa/levodopa CR 25/100 1 Tablet(s) Oral every 24 hours  donepezil 10 milliGRAM(s) Oral at bedtime  enoxaparin Injectable 40 milliGRAM(s) SubCutaneous every 24 hours  entacapone 200 milliGRAM(s) Oral <User Schedule>  metoprolol succinate  milliGRAM(s) Oral daily  niacin  milliGRAM(s) Oral at bedtime  pramipexole 1 milliGRAM(s) Oral three times a day  rosuvastatin 10 milliGRAM(s) Oral at bedtime  senna 2 Tablet(s) Oral at bedtime  tamsulosin 0.4 milliGRAM(s) Oral at bedtime    MEDICATIONS  (PRN):  acetaminophen     Tablet .. 650 milliGRAM(s) Oral every 6 hours PRN Temp greater or equal to 38C (100.4F), Mild Pain (1 - 3)  aluminum hydroxide/magnesium hydroxide/simethicone Suspension 30 milliLiter(s) Oral every 4 hours PRN Dyspepsia  melatonin 3 milliGRAM(s) Oral at bedtime PRN Insomnia  ondansetron Injectable 4 milliGRAM(s) IV Push every 8 hours PRN Nausea and/or Vomiting  polyethylene glycol 3350 17 Gram(s) Oral daily PRN Constipation

## 2024-11-07 NOTE — PROGRESS NOTE ADULT - ASSESSMENT
Patient is a 74 year old male with past medical history of HTN, HLD, CAD s/p PCI/CABG x 2, BPH, and Parkinson's who presented with recurrent falls and hallucinations. Admitted for further evaluation and referral for Parkinson's rehab.     #recurrent falls  #gait instability  #hallucinations  #History of Parkinson's Disease  - CT head (11/04) negative for acute intracranial abnormality.   - Chest X-ray (11/04) negative for acute cardiopulmonary disease.   - no head trauma per pt and no signs on examination  - UA negative  - fall precautions  - c/w Sinemet  - c/w entacapone  - c/w donepezil  - c/w pramiprexole  - PT/OT & PMR recommended acute inpatient rehab  - neurology consult- Dr. Hendrix recommended inpatient rehab/ Parkinson's program  - waiting on beds    #bilateral lower extremity edema  - B/L lower extremity venous doppler (11/04/2024): negative for DVT  - c/w lasix 20mg po, stop after 3 days  - monitor kidney function  - elevate legs  - ACE wrap for compression prn    #thrombocytopenia  - plt 134k > 129k> 132k 11/06/2024  - monitor for now    #HTN  - c/w metoprolol 100mg    #HLD  - c/w rosuvastatin 10mg  - c/w niacin 500mg     #CAD s/p PCI/CABG x 2  - c/w ASA    #BPH  - c/w flomax    #DVT ppx  - Lovenox    #Diet  - DASH/TLC    #GOC  - full code     Wife, Naga, updated at bedside.     *Case seen and discussed with Dr. Nguyen Patient is a 74 year old male with past medical history of HTN, HLD, CAD s/p PCI/CABG x 2, BPH, and Parkinson's who presented with recurrent falls and hallucinations. Admitted for further evaluation and referral for Parkinson's rehab.     #recurrent falls  #gait instability  #hallucinations  #History of Parkinson's Disease  - CT head (11/04) negative for acute intracranial abnormality.   - Chest X-ray (11/04) negative for acute cardiopulmonary disease.   - no head trauma per pt and no signs on examination  - UA negative  - fall precautions  - c/w Sinemet  - c/w entacapone  - c/w donepezil  - c/w pramiprexole  - PT/OT & PMR recommended acute inpatient rehab  - neurology consult- Dr. Hendrix recommended inpatient rehab/ Parkinson's program  - pending inpatient rehab bed availability     #bilateral lower extremity edema  - B/L lower extremity venous doppler (11/04/2024): negative for DVT  - s/p lasix 40mg po x 3 days  - Cr wnl  - elevate legs daily  - ACE wrap for compression if needed    #constipation  - per pt LBM was a few days ago  - miralax and senna added     #thrombocytopenia, chronic, stable  - plt 134k > 129k> 132k > 129k 11/07/2024  - monitor for now    #HTN  - c/w metoprolol 100mg    #HLD  - c/w rosuvastatin 10mg  - c/w niacin 500mg     #CAD s/p PCI/CABG x 2  - c/w ASA 81mg     #BPH  - c/w flomax    #DVT ppx  - Lovenox    #Diet  - DASH/TLC    #GOC  - full code     *Case seen and discussed with Dr. Warren

## 2024-11-07 NOTE — CHART NOTE - NSCHARTNOTEFT_GEN_A_CORE
74 y o male presents with worsening symptoms of Parkinson's, including increased frequency of falls and new onset hallucinations as per chart.  SW consulted to submit referral for the Parkinson's program at [facility].  SW met with spouse at bedside introduced self and role.  Spouse stated that the patient's health condition has worsened and she was of the opinion that he would benefit from the Parkinson's program.  ED Provider requested PT/OT/PMR consults and a referral was submitted for evaluation.  CM/LORENZA will continue to follow.
Spoke with patient's wife, Naga, at bedside and updated regarding patient's hospital course. All questions and concerns addressed.

## 2024-11-07 NOTE — DIETITIAN INITIAL EVALUATION ADULT - ENERGY INTAKE
Since admission wife reports adequate appetite/ intake of foods provided. Per nursing flow sheet % consumption.  Adequate (%)

## 2024-11-07 NOTE — DIETITIAN INITIAL EVALUATION ADULT - ORAL INTAKE PTA/DIET HISTORY
Pt seen this afternoon with wife present at bedside. Wife reports adequate appetite at home and cooks most meals. PTA was not following a special diet. NKFA/ intolerances.  lb.  lbs. Endorses chronic constipation due to Parkinson's recommend prune juice to stimulate BMs- pt receptive.

## 2024-11-07 NOTE — PROGRESS NOTE ADULT - SUBJECTIVE AND OBJECTIVE BOX
Patient is a 74y old  Male who presents with a chief complaint of Frequent Falls, Hallucinations (06 Nov 2024 13:06)      INTERVAL HPI/OVERNIGHT EVENTS: Patient seen and examined at bedside. States that swelling has improved. Endorses that he has not had a bowel movement in a few days. Tried to go three times yesterday, but was unsuccessful. No other overnight events.     MEDICATIONS  (STANDING):  aspirin enteric coated 81 milliGRAM(s) Oral at bedtime  carbidopa/levodopa  25/250 1 Tablet(s) Oral <User Schedule>  carbidopa/levodopa CR 25/100 1 Tablet(s) Oral every 24 hours  donepezil 10 milliGRAM(s) Oral at bedtime  enoxaparin Injectable 40 milliGRAM(s) SubCutaneous every 24 hours  entacapone 200 milliGRAM(s) Oral <User Schedule>  furosemide    Tablet 20 milliGRAM(s) Oral daily  metoprolol succinate  milliGRAM(s) Oral daily  niacin  milliGRAM(s) Oral at bedtime  pramipexole 1 milliGRAM(s) Oral three times a day  rosuvastatin 10 milliGRAM(s) Oral at bedtime  tamsulosin 0.4 milliGRAM(s) Oral at bedtime    MEDICATIONS  (PRN):  acetaminophen     Tablet .. 650 milliGRAM(s) Oral every 6 hours PRN Temp greater or equal to 38C (100.4F), Mild Pain (1 - 3)  aluminum hydroxide/magnesium hydroxide/simethicone Suspension 30 milliLiter(s) Oral every 4 hours PRN Dyspepsia  melatonin 3 milliGRAM(s) Oral at bedtime PRN Insomnia  ondansetron Injectable 4 milliGRAM(s) IV Push every 8 hours PRN Nausea and/or Vomiting      Allergies    No Known Allergies    Intolerances        REVIEW OF SYSTEMS:  CONSTITUTIONAL: No fever or chills  HEENT:  No headache, no sore throat  RESPIRATORY: No cough, wheezing, or shortness of breath  CARDIOVASCULAR: No chest pain, palpitations  GASTROINTESTINAL: No abd pain, nausea, vomiting, or diarrhea  GENITOURINARY: No dysuria, frequency, or hematuria  NEUROLOGICAL: no focal weakness or dizziness  MUSCULOSKELETAL: no myalgias     Vital Signs Last 24 Hrs  T(C): 36.3 (06 Nov 2024 12:30), Max: 36.6 (06 Nov 2024 04:50)  T(F): 97.3 (06 Nov 2024 12:30), Max: 97.8 (06 Nov 2024 04:50)  HR: 63 (06 Nov 2024 12:30) (59 - 73)  BP: 120/75 (06 Nov 2024 12:30) (118/75 - 180/65)  BP(mean): --  RR: 17 (06 Nov 2024 12:30) (16 - 18)  SpO2: 96% (06 Nov 2024 12:30) (95% - 99%)    Parameters below as of 06 Nov 2024 12:30  Patient On (Oxygen Delivery Method): room air      I&O's Summary    05 Nov 2024 07:01  -  06 Nov 2024 07:00  --------------------------------------------------------  IN: 120 mL / OUT: 1500 mL / NET: -1380 mL    06 Nov 2024 07:01  -  06 Nov 2024 13:30  --------------------------------------------------------  IN: 360 mL / OUT: 500 mL / NET: -140 mL      BMI (kg/m2): 30.7 (11-05-24 @ 00:48)    PHYSICAL EXAM:  General: NAD, lying in bed comfortably  Neurology: A&Ox3, moves all extremities spontaneously  ENT/Neck: Neck supple, trachea midline, No JVD, Gross hearing intact  Respiratory: CTA B/L, No wheezing, rales, rhonchi  CV: RRR, +S1/S2, -S3/S4, no murmurs, rubs or gallops  Abdominal: Soft, Nontender, Nondistended, +Bowel sounds x 4  MSK: 5/5 strength UE/LE bilaterally  Extremities: +pitting edema b/l with blister noted on Left lower extremity, no ulcers or skin discolorations noted.       LABS: Personally reviewed  CBC                        14.0   7.63  )-----------( 132      ( 06 Nov 2024 07:07 )             40.2     CMP  11-06    140  |  104  |  22  ----------------------------<  96  3.9   |  28  |  1.12    Ca    9.0      06 Nov 2024 07:07  Phos  3.2     11-06  Mg     1.9     11-06    TPro  6.9  /  Alb  3.5  /  TBili  1.3  /  DBili  x   /  AST  19  /  ALT  11  /  AlkPhos  72  11-06        Urinalysis Basic - ( 06 Nov 2024 07:07 )    Color: x / Appearance: x / SG: x / pH: x  Gluc: 96 mg/dL / Ketone: x  / Bili: x / Urobili: x   Blood: x / Protein: x / Nitrite: x   Leuk Esterase: x / RBC: x / WBC x   Sq Epi: x / Non Sq Epi: x / Bacteria: x              Urinalysis with Rflx Culture (collected 11-04-24 @ 16:07)       Patient is a 74y old  Male who presents with a chief complaint of Frequent Falls, Hallucinations (06 Nov 2024 13:06)      INTERVAL HPI/OVERNIGHT EVENTS: Patient seen and examined at bedside. States that swelling has improved. Endorses that he has not had a bowel movement in a few days. Tried to go three times yesterday, but was unsuccessful. No other overnight events.     REVIEW OF SYSTEMS:  CONSTITUTIONAL: No fever or chills  HEENT:  No headache, no sore throat  RESPIRATORY: No cough, wheezing, or shortness of breath  CARDIOVASCULAR: No chest pain, palpitations  GASTROINTESTINAL: +constipation. No abd pain, nausea, vomiting, or diarrhea  GENITOURINARY: No dysuria, frequency, or hematuria  NEUROLOGICAL: no focal weakness or dizziness  MUSCULOSKELETAL: no myalgias     Vital Signs Last 24 Hrs  T(C): 36.6 (07 Nov 2024 05:01), Max: 36.6 (07 Nov 2024 05:01)  T(F): 97.9 (07 Nov 2024 05:01), Max: 97.9 (07 Nov 2024 05:01)  HR: 77 (07 Nov 2024 05:01) (63 - 77)  BP: 144/83 (07 Nov 2024 05:01) (120/75 - 144/83)  BP(mean): --  RR: 16 (07 Nov 2024 05:01) (16 - 17)  SpO2: 96% (07 Nov 2024 05:01) (96% - 96%)    Parameters below as of 07 Nov 2024 05:01  Patient On (Oxygen Delivery Method): room air    I&O's Summary    06 Nov 2024 07:01  -  07 Nov 2024 07:00  --------------------------------------------------------  IN: 360 mL / OUT: 1200 mL / NET: -840 mL    07 Nov 2024 07:01  -  07 Nov 2024 12:07  --------------------------------------------------------  IN: 120 mL / OUT: 0 mL / NET: 120 mL    BMI (kg/m2): 30.7 (11-05-24 @ 00:48)    PHYSICAL EXAM:  General: NAD, lying in bed comfortably  Neurology: A&Ox3, moves all extremities spontaneously  ENT/Neck: Neck supple, trachea midline, No JVD, Gross hearing intact  Respiratory: CTA B/L, No wheezing, rales, rhonchi  CV: RRR, +S1/S2, -S3/S4, no murmurs, rubs or gallops  Abdominal: Soft, Nontender, Nondistended, +Bowel sounds x 4  MSK: moves all extremities spontaneously  Extremities: +pitting edema b/l with draining blister noted on Left lower extremity, peripheral pulses intact.       LABS: Personally reviewed                        14.3   7.99  )-----------( 129      ( 07 Nov 2024 07:52 )             42.3     11-07    139  |  104  |  23  ----------------------------<  93  4.1   |  29  |  1.06    Ca    9.4      07 Nov 2024 07:52  Phos  3.2     11-06  Mg     1.9     11-06    TPro  7.1  /  Alb  3.5  /  TBili  1.5[H]  /  DBili  x   /  AST  17  /  ALT  14  /  AlkPhos  69  11-07    IMAGING  LOWER EXTREMITY DOPPLER B/L 11/04/2024  IMPRESSION:  No evidence of deep venous thrombosis in either lower extremity.  ====================  CT HEAD NONCON 11/04/2024  IMPRESSION:  1)  generalized volume loss andinvolutional change, with no acute   abnormality suggested.  2)  no intracerebral hemorrhage, contusion, or hemorrhagic collections   identified.  ====================  CHEST X-RAY 11/04/2024  IMPRESSION:  No radiographic evidence of active chest disease..

## 2024-11-08 ENCOUNTER — INPATIENT (INPATIENT)
Facility: HOSPITAL | Age: 74
LOS: 13 days | Discharge: HOME | DRG: 57 | End: 2024-11-22
Attending: PSYCHIATRY & NEUROLOGY | Admitting: PHYSICAL MEDICINE & REHABILITATION
Payer: MEDICARE

## 2024-11-08 ENCOUNTER — TRANSCRIPTION ENCOUNTER (OUTPATIENT)
Age: 74
End: 2024-11-08

## 2024-11-08 VITALS
HEART RATE: 65 BPM | TEMPERATURE: 97 F | RESPIRATION RATE: 17 BRPM | SYSTOLIC BLOOD PRESSURE: 119 MMHG | OXYGEN SATURATION: 97 % | DIASTOLIC BLOOD PRESSURE: 73 MMHG

## 2024-11-08 VITALS
WEIGHT: 213.85 LBS | OXYGEN SATURATION: 99 % | HEART RATE: 66 BPM | DIASTOLIC BLOOD PRESSURE: 74 MMHG | TEMPERATURE: 98 F | HEIGHT: 70 IN | SYSTOLIC BLOOD PRESSURE: 136 MMHG | RESPIRATION RATE: 16 BRPM

## 2024-11-08 DIAGNOSIS — G20.A1 PARKINSON'S DISEASE WITHOUT DYSKINESIA, WITHOUT MENTION OF FLUCTUATIONS: ICD-10-CM

## 2024-11-08 DIAGNOSIS — Z95.1 PRESENCE OF AORTOCORONARY BYPASS GRAFT: Chronic | ICD-10-CM

## 2024-11-08 PROBLEM — I25.10 ATHEROSCLEROTIC HEART DISEASE OF NATIVE CORONARY ARTERY WITHOUT ANGINA PECTORIS: Chronic | Status: ACTIVE | Noted: 2024-11-04

## 2024-11-08 PROBLEM — Z87.438 PERSONAL HISTORY OF OTHER DISEASES OF MALE GENITAL ORGANS: Chronic | Status: ACTIVE | Noted: 2024-11-04

## 2024-11-08 PROBLEM — Z86.69 PERSONAL HISTORY OF OTHER DISEASES OF THE NERVOUS SYSTEM AND SENSE ORGANS: Chronic | Status: ACTIVE | Noted: 2024-11-04

## 2024-11-08 LAB
ALBUMIN SERPL ELPH-MCNC: 3.5 G/DL — SIGNIFICANT CHANGE UP (ref 3.3–5)
ALP SERPL-CCNC: 78 U/L — SIGNIFICANT CHANGE UP (ref 40–120)
ALT FLD-CCNC: 10 U/L — SIGNIFICANT CHANGE UP (ref 10–45)
ANION GAP SERPL CALC-SCNC: 7 MMOL/L — SIGNIFICANT CHANGE UP (ref 5–17)
AST SERPL-CCNC: 15 U/L — SIGNIFICANT CHANGE UP (ref 10–40)
BASOPHILS # BLD AUTO: 0.07 K/UL — SIGNIFICANT CHANGE UP (ref 0–0.2)
BASOPHILS NFR BLD AUTO: 0.9 % — SIGNIFICANT CHANGE UP (ref 0–2)
BILIRUB SERPL-MCNC: 0.9 MG/DL — SIGNIFICANT CHANGE UP (ref 0.2–1.2)
BUN SERPL-MCNC: 30 MG/DL — HIGH (ref 7–23)
CALCIUM SERPL-MCNC: 9.3 MG/DL — SIGNIFICANT CHANGE UP (ref 8.4–10.5)
CHLORIDE SERPL-SCNC: 102 MMOL/L — SIGNIFICANT CHANGE UP (ref 96–108)
CO2 SERPL-SCNC: 29 MMOL/L — SIGNIFICANT CHANGE UP (ref 22–31)
CREAT SERPL-MCNC: 1.4 MG/DL — HIGH (ref 0.5–1.3)
EGFR: 52 ML/MIN/1.73M2 — LOW
EOSINOPHIL # BLD AUTO: 0.18 K/UL — SIGNIFICANT CHANGE UP (ref 0–0.5)
EOSINOPHIL NFR BLD AUTO: 2.3 % — SIGNIFICANT CHANGE UP (ref 0–6)
GLUCOSE SERPL-MCNC: 84 MG/DL — SIGNIFICANT CHANGE UP (ref 70–99)
HCT VFR BLD CALC: 43.3 % — SIGNIFICANT CHANGE UP (ref 39–50)
HGB BLD-MCNC: 14.6 G/DL — SIGNIFICANT CHANGE UP (ref 13–17)
IMM GRANULOCYTES NFR BLD AUTO: 0.5 % — SIGNIFICANT CHANGE UP (ref 0–0.9)
LYMPHOCYTES # BLD AUTO: 2.05 K/UL — SIGNIFICANT CHANGE UP (ref 1–3.3)
LYMPHOCYTES # BLD AUTO: 26.3 % — SIGNIFICANT CHANGE UP (ref 13–44)
MAGNESIUM SERPL-MCNC: 1.8 MG/DL — SIGNIFICANT CHANGE UP (ref 1.6–2.6)
MCHC RBC-ENTMCNC: 28.4 PG — SIGNIFICANT CHANGE UP (ref 27–34)
MCHC RBC-ENTMCNC: 33.7 G/DL — SIGNIFICANT CHANGE UP (ref 32–36)
MCV RBC AUTO: 84.2 FL — SIGNIFICANT CHANGE UP (ref 80–100)
MONOCYTES # BLD AUTO: 0.76 K/UL — SIGNIFICANT CHANGE UP (ref 0–0.9)
MONOCYTES NFR BLD AUTO: 9.7 % — SIGNIFICANT CHANGE UP (ref 2–14)
NEUTROPHILS # BLD AUTO: 4.7 K/UL — SIGNIFICANT CHANGE UP (ref 1.8–7.4)
NEUTROPHILS NFR BLD AUTO: 60.3 % — SIGNIFICANT CHANGE UP (ref 43–77)
NRBC # BLD: 0 /100 WBCS — SIGNIFICANT CHANGE UP (ref 0–0)
PHOSPHATE SERPL-MCNC: 3.2 MG/DL — SIGNIFICANT CHANGE UP (ref 2.5–4.5)
PLATELET # BLD AUTO: 139 K/UL — LOW (ref 150–400)
POTASSIUM SERPL-MCNC: 4.4 MMOL/L — SIGNIFICANT CHANGE UP (ref 3.5–5.3)
POTASSIUM SERPL-SCNC: 4.4 MMOL/L — SIGNIFICANT CHANGE UP (ref 3.5–5.3)
PROT SERPL-MCNC: 7.2 G/DL — SIGNIFICANT CHANGE UP (ref 6–8.3)
RBC # BLD: 5.14 M/UL — SIGNIFICANT CHANGE UP (ref 4.2–5.8)
RBC # FLD: 15.3 % — HIGH (ref 10.3–14.5)
SARS-COV-2 RNA SPEC QL NAA+PROBE: SIGNIFICANT CHANGE UP
SODIUM SERPL-SCNC: 138 MMOL/L — SIGNIFICANT CHANGE UP (ref 135–145)
WBC # BLD: 7.8 K/UL — SIGNIFICANT CHANGE UP (ref 3.8–10.5)
WBC # FLD AUTO: 7.8 K/UL — SIGNIFICANT CHANGE UP (ref 3.8–10.5)

## 2024-11-08 PROCEDURE — 99233 SBSQ HOSP IP/OBS HIGH 50: CPT | Mod: GC

## 2024-11-08 RX ORDER — POLYETHYLENE GLYCOL 3350 17 G/17G
17 POWDER, FOR SOLUTION ORAL DAILY
Refills: 0 | Status: DISCONTINUED | OUTPATIENT
Start: 2024-11-08 | End: 2024-11-13

## 2024-11-08 RX ORDER — SENNOSIDES 8.6 MG
2 TABLET ORAL AT BEDTIME
Refills: 0 | Status: DISCONTINUED | OUTPATIENT
Start: 2024-11-08 | End: 2024-11-22

## 2024-11-08 RX ORDER — CARBIDOPA AND LEVODOPA 10; 100 MG/1; MG/1
1 TABLET ORAL
Refills: 0 | Status: DISCONTINUED | OUTPATIENT
Start: 2024-11-09 | End: 2024-11-22

## 2024-11-08 RX ORDER — POLYETHYLENE GLYCOL 3350 17 G/17G
17 POWDER, FOR SOLUTION ORAL
Qty: 0 | Refills: 0 | DISCHARGE
Start: 2024-11-08

## 2024-11-08 RX ORDER — FIRST AID ANTIBIOTIC 500 [USP'U]/G
1 OINTMENT TOPICAL
Qty: 0 | Refills: 0 | DISCHARGE
Start: 2024-11-08

## 2024-11-08 RX ORDER — METOPROLOL TARTRATE 100 MG/1
100 TABLET, FILM COATED ORAL DAILY
Refills: 0 | Status: DISCONTINUED | OUTPATIENT
Start: 2024-11-09 | End: 2024-11-22

## 2024-11-08 RX ORDER — DONEPEZIL HYDROCHLORIDE 5 MG/1
10 TABLET, FILM COATED ORAL AT BEDTIME
Refills: 0 | Status: DISCONTINUED | OUTPATIENT
Start: 2024-11-08 | End: 2024-11-22

## 2024-11-08 RX ORDER — PRAMIPEXOLE 1.5 MG/1
1 TABLET, EXTENDED RELEASE ORAL THREE TIMES A DAY
Refills: 0 | Status: DISCONTINUED | OUTPATIENT
Start: 2024-11-08 | End: 2024-11-11

## 2024-11-08 RX ORDER — SODIUM CHLORIDE 9 MG/ML
1000 INJECTION, SOLUTION INTRAMUSCULAR; INTRAVENOUS; SUBCUTANEOUS
Refills: 0 | Status: DISCONTINUED | OUTPATIENT
Start: 2024-11-08 | End: 2024-11-08

## 2024-11-08 RX ORDER — ROSUVASTATIN CALCIUM 5 MG/1
10 TABLET, FILM COATED ORAL AT BEDTIME
Refills: 0 | Status: DISCONTINUED | OUTPATIENT
Start: 2024-11-08 | End: 2024-11-22

## 2024-11-08 RX ORDER — TAMSULOSIN HYDROCHLORIDE 0.4 MG/1
0.4 CAPSULE ORAL AT BEDTIME
Refills: 0 | Status: DISCONTINUED | OUTPATIENT
Start: 2024-11-08 | End: 2024-11-22

## 2024-11-08 RX ORDER — ACETAMINOPHEN, DIPHENHYDRAMINE HCL, PHENYLEPHRINE HCL 325; 25; 5 MG/1; MG/1; MG/1
3 TABLET ORAL AT BEDTIME
Refills: 0 | Status: DISCONTINUED | OUTPATIENT
Start: 2024-11-08 | End: 2024-11-21

## 2024-11-08 RX ORDER — SENNA 187 MG
2 TABLET ORAL
Qty: 0 | Refills: 0 | DISCHARGE
Start: 2024-11-08

## 2024-11-08 RX ORDER — ENOXAPARIN SODIUM 30 MG/.3ML
40 INJECTION SUBCUTANEOUS EVERY 24 HOURS
Refills: 0 | Status: DISCONTINUED | OUTPATIENT
Start: 2024-11-09 | End: 2024-11-22

## 2024-11-08 RX ORDER — CARBIDOPA AND LEVODOPA 10; 100 MG/1; MG/1
1 TABLET ORAL
Refills: 0 | Status: DISCONTINUED | OUTPATIENT
Start: 2024-11-08 | End: 2024-11-22

## 2024-11-08 RX ORDER — ENTACAPONE 200 MG/1
200 TABLET, FILM COATED ORAL
Refills: 0 | Status: DISCONTINUED | OUTPATIENT
Start: 2024-11-08 | End: 2024-11-22

## 2024-11-08 RX ORDER — ACETAMINOPHEN 500MG 500 MG/1
650 TABLET, COATED ORAL EVERY 6 HOURS
Refills: 0 | Status: DISCONTINUED | OUTPATIENT
Start: 2024-11-08 | End: 2024-11-22

## 2024-11-08 RX ORDER — FIRST AID ANTIBIOTIC 500 [USP'U]/G
1 OINTMENT TOPICAL DAILY
Refills: 0 | Status: DISCONTINUED | OUTPATIENT
Start: 2024-11-08 | End: 2024-11-08

## 2024-11-08 RX ADMIN — PRAMIPEXOLE 1 MILLIGRAM(S): 1.5 TABLET, EXTENDED RELEASE ORAL at 23:53

## 2024-11-08 RX ADMIN — Medication 1 TABLET(S): at 10:39

## 2024-11-08 RX ADMIN — Medication 81 MILLIGRAM(S): at 20:28

## 2024-11-08 RX ADMIN — Medication 1 TABLET(S): at 00:09

## 2024-11-08 RX ADMIN — TAMSULOSIN HYDROCHLORIDE 0.4 MILLIGRAM(S): 0.4 CAPSULE ORAL at 20:27

## 2024-11-08 RX ADMIN — ENTACAPONE 200 MILLIGRAM(S): 200 TABLET, FILM COATED ORAL at 06:53

## 2024-11-08 RX ADMIN — PRAMIPEXOLE DIHYDROCHLORIDE 1 MILLIGRAM(S): 0.12 TABLET ORAL at 14:10

## 2024-11-08 RX ADMIN — ENTACAPONE 200 MILLIGRAM(S): 200 TABLET, FILM COATED ORAL at 19:25

## 2024-11-08 RX ADMIN — ENTACAPONE 200 MILLIGRAM(S): 200 TABLET, FILM COATED ORAL at 10:36

## 2024-11-08 RX ADMIN — ROSUVASTATIN CALCIUM 10 MILLIGRAM(S): 5 TABLET, FILM COATED ORAL at 20:27

## 2024-11-08 RX ADMIN — Medication 2 TABLET(S): at 20:27

## 2024-11-08 RX ADMIN — Medication 100 MILLIGRAM(S): at 06:53

## 2024-11-08 RX ADMIN — Medication 500 MILLIGRAM(S): at 20:27

## 2024-11-08 RX ADMIN — ACETAMINOPHEN, DIPHENHYDRAMINE HCL, PHENYLEPHRINE HCL 3 MILLIGRAM(S): 325; 25; 5 TABLET ORAL at 23:53

## 2024-11-08 RX ADMIN — CARBIDOPA AND LEVODOPA 1 TABLET(S): 10; 100 TABLET ORAL at 19:25

## 2024-11-08 RX ADMIN — PRAMIPEXOLE DIHYDROCHLORIDE 1 MILLIGRAM(S): 0.12 TABLET ORAL at 06:53

## 2024-11-08 RX ADMIN — ENTACAPONE 200 MILLIGRAM(S): 200 TABLET, FILM COATED ORAL at 14:09

## 2024-11-08 RX ADMIN — DONEPEZIL HYDROCHLORIDE 10 MILLIGRAM(S): 5 TABLET, FILM COATED ORAL at 20:27

## 2024-11-08 RX ADMIN — Medication 40 MILLIGRAM(S): at 06:52

## 2024-11-08 RX ADMIN — CARBIDOPA AND LEVODOPA 1 TABLET(S): 10; 100 TABLET ORAL at 23:53

## 2024-11-08 RX ADMIN — Medication 1 TABLET(S): at 14:10

## 2024-11-08 RX ADMIN — SODIUM CHLORIDE 75 MILLILITER(S): 9 INJECTION, SOLUTION INTRAMUSCULAR; INTRAVENOUS; SUBCUTANEOUS at 14:12

## 2024-11-08 RX ADMIN — Medication 1 TABLET(S): at 06:53

## 2024-11-08 RX ADMIN — FIRST AID ANTIBIOTIC 1 APPLICATION(S): 500 OINTMENT TOPICAL at 14:12

## 2024-11-08 NOTE — DISCHARGE NOTE NURSING/CASE MANAGEMENT/SOCIAL WORK - FINANCIAL ASSISTANCE
[facility] provides services at a reduced cost to those who are determined to be eligible through [facility]’s financial assistance program. Information regarding [facility]’s financial assistance program can be found by going to [email or web address] or by calling [phone].

## 2024-11-08 NOTE — PATIENT PROFILE ADULT - FALL HARM RISK - HARM RISK INTERVENTIONS
Assistance with ambulation/Assistance OOB with selected safe patient handling equipment/Communicate Risk of Fall with Harm to all staff/Discuss with provider need for PT consult/Monitor gait and stability/Reinforce activity limits and safety measures with patient and family/Tailored Fall Risk Interventions/Visual Cue: Yellow wristband and red socks/Bed in lowest position, wheels locked, appropriate side rails in place/Call bell, personal items and telephone in reach/Instruct patient to call for assistance before getting out of bed or chair/Non-slip footwear when patient is out of bed/[place] to call system/Physically safe environment - no spills, clutter or unnecessary equipment/Purposeful Proactive Rounding/Room/bathroom lighting operational, light cord in reach

## 2024-11-08 NOTE — PROGRESS NOTE ADULT - SUBJECTIVE AND OBJECTIVE BOX
Patient is a 74y old  Male who presents with a chief complaint of Frequent Falls, Hallucinations (06 Nov 2024 13:06)      INTERVAL HPI/OVERNIGHT EVENTS: Patient seen and examined at bedside. States that swelling has improved. Endorses that he has not had a bowel movement in a few days. Tried to go three times yesterday, but was unsuccessful. No other overnight events.     REVIEW OF SYSTEMS:  CONSTITUTIONAL: No fever or chills  HEENT:  No headache, no sore throat  RESPIRATORY: No cough, wheezing, or shortness of breath  CARDIOVASCULAR: No chest pain, palpitations  GASTROINTESTINAL: +constipation. No abd pain, nausea, vomiting, or diarrhea  GENITOURINARY: No dysuria, frequency, or hematuria  NEUROLOGICAL: no focal weakness or dizziness  MUSCULOSKELETAL: no myalgias     Vital Signs Last 24 Hrs  T(C): 36.6 (07 Nov 2024 05:01), Max: 36.6 (07 Nov 2024 05:01)  T(F): 97.9 (07 Nov 2024 05:01), Max: 97.9 (07 Nov 2024 05:01)  HR: 77 (07 Nov 2024 05:01) (63 - 77)  BP: 144/83 (07 Nov 2024 05:01) (120/75 - 144/83)  BP(mean): --  RR: 16 (07 Nov 2024 05:01) (16 - 17)  SpO2: 96% (07 Nov 2024 05:01) (96% - 96%)    Parameters below as of 07 Nov 2024 05:01  Patient On (Oxygen Delivery Method): room air    I&O's Summary    06 Nov 2024 07:01  -  07 Nov 2024 07:00  --------------------------------------------------------  IN: 360 mL / OUT: 1200 mL / NET: -840 mL    07 Nov 2024 07:01  -  07 Nov 2024 12:07  --------------------------------------------------------  IN: 120 mL / OUT: 0 mL / NET: 120 mL    BMI (kg/m2): 30.7 (11-05-24 @ 00:48)    PHYSICAL EXAM:  General: NAD, lying in bed comfortably  Neurology: A&Ox3, moves all extremities spontaneously  ENT/Neck: Neck supple, trachea midline, No JVD, Gross hearing intact  Respiratory: CTA B/L, No wheezing, rales, rhonchi  CV: RRR, +S1/S2, -S3/S4, no murmurs, rubs or gallops  Abdominal: Soft, Nontender, Nondistended, +Bowel sounds x 4  MSK: moves all extremities spontaneously  Extremities: +pitting edema b/l with draining blister noted on Left lower extremity, peripheral pulses intact.       LABS: Personally reviewed                        14.3   7.99  )-----------( 129      ( 07 Nov 2024 07:52 )             42.3     11-07    139  |  104  |  23  ----------------------------<  93  4.1   |  29  |  1.06    Ca    9.4      07 Nov 2024 07:52  Phos  3.2     11-06  Mg     1.9     11-06    TPro  7.1  /  Alb  3.5  /  TBili  1.5[H]  /  DBili  x   /  AST  17  /  ALT  14  /  AlkPhos  69  11-07    IMAGING  LOWER EXTREMITY DOPPLER B/L 11/04/2024  IMPRESSION:  No evidence of deep venous thrombosis in either lower extremity.  ====================  CT HEAD NONCON 11/04/2024  IMPRESSION:  1)  generalized volume loss andinvolutional change, with no acute   abnormality suggested.  2)  no intracerebral hemorrhage, contusion, or hemorrhagic collections   identified.  ====================  CHEST X-RAY 11/04/2024  IMPRESSION:  No radiographic evidence of active chest disease..           Patient is a 74y old  Male who presents with a chief complaint of Frequent Falls, Hallucinations (06 Nov 2024 13:06)      INTERVAL HPI/OVERNIGHT EVENTS: Patient seen and examined at bedside. Had a bowel movement last night. Otherwise notes some pain to LLE at site of blister drainage. No other acute complaints.     REVIEW OF SYSTEMS:  CONSTITUTIONAL: No fever or chills  HEENT:  No headache, no sore throat  RESPIRATORY: No cough, wheezing, or shortness of breath  CARDIOVASCULAR: No chest pain, palpitations  GASTROINTESTINAL: +constipation. No abd pain, nausea, vomiting, or diarrhea  GENITOURINARY: No dysuria, frequency, or hematuria  NEUROLOGICAL: no focal weakness or dizziness  MUSCULOSKELETAL: no myalgias     Vital Signs Last 24 Hrs  T(C): 36.3 (08 Nov 2024 11:33), Max: 36.6 (08 Nov 2024 05:30)  T(F): 97.3 (08 Nov 2024 11:33), Max: 97.9 (08 Nov 2024 05:30)  HR: 65 (08 Nov 2024 11:33) (65 - 73)  BP: 119/73 (08 Nov 2024 11:33) (119/73 - 158/84)  BP(mean): --  RR: 17 (08 Nov 2024 11:33) (16 - 17)  SpO2: 97% (08 Nov 2024 11:33) (96% - 97%)    Parameters below as of 08 Nov 2024 11:33  Patient On (Oxygen Delivery Method): room air      PHYSICAL EXAM:  General: NAD, lying in bed comfortably  Neurology: A&Ox3, moves all extremities spontaneously  ENT/Neck: Neck supple, trachea midline, No JVD, Gross hearing intact  Respiratory: CTA B/L, No wheezing, rales, rhonchi  CV: RRR, +S1/S2, -S3/S4, no murmurs, rubs or gallops  Abdominal: Soft, Nontender, Nondistended, +Bowel sounds x 4  MSK: moves all extremities spontaneously  Extremities: +pitting edema b/l with draining blister noted on Left lower extremity, peripheral pulses intact.       LABS: Personally reviewed                        14.6   7.80  )-----------( 139      ( 08 Nov 2024 06:46 )             43.3     11-08    138  |  102  |  30[H]  ----------------------------<  84  4.4   |  29  |  1.40[H]    Ca    9.3      08 Nov 2024 06:46  Phos  3.2     11-08  Mg     1.8     11-08    TPro  7.2  /  Alb  3.5  /  TBili  0.9  /  DBili  x   /  AST  15  /  ALT  10  /  AlkPhos  78  11-08      IMAGING  LOWER EXTREMITY DOPPLER B/L 11/04/2024  IMPRESSION:  No evidence of deep venous thrombosis in either lower extremity.  ====================  CT HEAD NONCON 11/04/2024  IMPRESSION:  1)  generalized volume loss andinvolutional change, with no acute   abnormality suggested.  2)  no intracerebral hemorrhage, contusion, or hemorrhagic collections   identified.  ====================  CHEST X-RAY 11/04/2024  IMPRESSION:  No radiographic evidence of active chest disease..

## 2024-11-08 NOTE — H&P ADULT - HISTORY OF PRESENT ILLNESS
74 year old male with PMH of HTN, HLD, CAD s/p PCI/CABG x 2 (last 8 years ago), BPH, Parkinson's Disease (dx 8 years ago, follows with Dr. Claudio) who presented to [facility] on 11/6 from home for frequent falls and hallucinations. He has history of occasional falls with increased frequency over the last 1-2 weeks (2-3 times this week) attributed to gait instability due to stiffness and feeling "frozen". Due to these falls, he has been more immobile and over last weekend, family noted increased LE edema for which he takes PRN lasix. Patient also endorses increase hallucination episodes (e.g. snakes in the house, having boots on when he didn't). In the ED, he was afebrile and hemodynamically stable. CTH is negative for acute intracranial pathology. LE duplex negative for DVT. He was evaluated by PT, OT, and PMR and  recommended for inpatient acute rehab. Neurology consulted for medication optimization for Parkinson's disease. Pt given lasix for b/l LE edema. He was admitted to [facility] On 11/8/24.

## 2024-11-08 NOTE — H&P ADULT - NSHPPHYSICALEXAM_GEN_ALL_CORE
PHYSICAL EXAMINATION   VItals: T(C): 36.3 (11-08-24 @ 11:33), Max: 36.6 (11-08-24 @ 05:30)  HR: 65 (11-08-24 @ 11:33) (65 - 73)  BP: 119/73 (11-08-24 @ 11:33) (119/73 - 158/84)  RR: 17 (11-08-24 @ 11:33) (16 - 17)  SpO2: 97% (11-08-24 @ 11:33) (96% - 97%)    General: NAD, Resting Comfortable,                                  HEENT: NC/AT, EOM I, PERRLA, Normal Conjunctivae  Cardio: RRR, Normal S1-S2, No M/G/R                              Pulm: No Respiratory Distress,  Lungs CTAB                        Abdomen: ND/NT, Soft, BS+                                                MSK: No joint swelling, Full ROM                                         Ext: BLE edema with noted left leg blister, Pulses 2+ throughout, No calf tenderness    Skin:  Left leg blister                                                                   Neurological Examination    Cognitive: AAO x 3                                                                         Attention: Intact   Judgment: Good evidence of judgement                               Memory: Recall 3 objects immediate and only 1 without cues and other 2 with cues at 3 min later      Mood/Affect: wnl                                                                           Communication:  Fluent,  No dysarthria   Swallow: intact  Coordination: FTN/HTS intact                                                                              Sensory: Intact to light touch on BUE, decreased sensation on BLE with Left worse then right                                                                                      Tone: Mild rigidity noted throughout, bradykinesia noted with right worse then left, reports tremors on awaking but no noted tremors in this exam, hypomimia and hypophonia noted   Balance: impaired    Motor    LEFT    UE: 5/5   RIGHT UE: 5/5   LEFT    LE:  5/5   RIGHT LE:  5/5     Reflex:  2 + throughout

## 2024-11-08 NOTE — H&P ADULT - ASSESSMENT
ASSESSMENT/PLAN  74 year old male with PMH of HTN, HLD, CAD s/p PCI/CABG x 2 (last 8 years ago), BPH, Parkinson's Disease (dx 8 years ago, follows with Dr. Claudio) who presented to [facility] on 11/6 from home for frequent falls and hallucinations. He has history of occasional falls with increased frequency over the last 1-2 weeks (2-3 times this week) attributed to gait instability due to stiffness and feeling "frozen". Due to these falls, he has been more immobile and over last weekend, family noted increased LE edema for which he takes PRN lasix. Patient also endorses increase hallucination episodes (e.g. snakes in the house, having boots on when he didn't). In the ED, he was afebrile and hemodynamically stable. CTH is negative for acute intracranial pathology. LE duplex negative for DVT. He was evaluated by PT, OT, and PMR and  recommended for inpatient acute rehab. Neurology consulted for medication optimization for Parkinson's disease. Pt given lasix for b/l LE edema. He was admitted to [facility] On 11/8/24.       #Parkinson's Disease now with gait Instability, ADL impairments and Functional impairments  - Start Comprehensive Rehab Program of PT/OT/SLP    ------------------------------------------------------  Parkinson's related motor symptoms    #Rigidity/Bradykinesia/falls   -Continue Sinemet 25/250 mg 1 tab at 7am, 11am, 3pm, and 7pm with dose of entacapone 200mg at same timing  -Continue Sinemet 25/100mg CR 1 tab at 12am   -Mirapex 1mg TID - consider reducing dose   -Follows with Dr. Claudio   -Movement disorders following    --------------------------------------------------------  Parkinson's related non-motor symptoms    #Mood/memory/sleep  -Continue aricept 10mg daily   -Continue melatonin PRN   -Neuropsych  -Rec therapy     #Orthostatic hypotension  -Monitor OH vital signs  -Currently on metoprolol and flomax - monitor for continued need    #Pain control  - Tylenol PRN    #GI/Bowel Management/Constipation  - Continue Senna at bedtime   - Miralax daily    #Bladder management/BPH  - Continue to monitor PVR q 8 hours (SC if > 400)  -Monitor UO  -Continue flomax - monitor OH    ----------------------------------------------------------  Concurrent medical problems    #HTN  #CAD - s/p CABG and PCI in the past  -Continue metoprolol 100mg ER -monitor vitals  -EKG  -Niacin 500mg daily   -Crestor 10mg daily   -Continue aspirin 81mg daily     #bilateral lower extremity edema -with blister  - B/L lower extremity venous doppler (11/04/2024): negative for DVT  - s/p lasix 40mg po x 3 days as inpatient- monitor for need   - elevate legs daily  - ACE wrap for compression if needed    #DVT Prophylaxis  - Lovenox  - TEDs   -LE duplex 11/4 - negative for DVT    #Skin:  -***    FEN   - Diet - DASH diet  - Dysphagia  SLP - evaluation and treatment    Precautions / PROPHYLAXIS:   - Falls, Cardiac  - ortho: Weight bearing status: WBAT   - Lungs: Aspiration  - Pressure injury/Skin:  OOB to Chair, PT/OT      Danilo Javier  Pulmonary Disease  [address] [place] [id number]  Phone: [phone]  Fax: [phone]  Follow Up Time: 2 weeks    MEDICAL PROGNOSIS: GOOD              REHAB POTENTIAL: GOOD              ESTIMATED DISPOSITION: HOME WITH HOME CARE              ELOS: 10-14 Days   EXPECTED THERAPY:     P.T. 1hr/day       O.T. 1hr/day      S.L.P. 1hr/day       P&O Unnecessary       EXP FREQUENCY: 5 days per 7 day period     PRESCREEN COMPARISON:   I have reviewed the prescreen information and I have found no relevant changes between the preadmission screening and my post admission evaluation     RATIONALE FOR INPATIENT ADMISSION - Patient demonstrates the following: (check all that apply)  [X] Medically appropriate for rehabilitation admission  [X] Has attainable rehab goals with an appropriate initial discharge plan  [X] Has rehabilitation potential (expected to make a significant improvement within a reasonable period of time)   [X] Requires close medical management by a rehab physician, rehab nursing care, Hospitalist and comprehensive interdisciplinary team (including PT, OT, & or SLP, Prosthetics and Orthotics)   ASSESSMENT/PLAN  74 year old male with PMH of HTN, HLD, CAD s/p PCI/CABG x 2 (last 8 years ago), BPH, Parkinson's Disease (dx 8 years ago, follows with Dr. Claudio) who presented to [facility] on 11/6 from home for frequent falls and hallucinations. He has history of occasional falls with increased frequency over the last 1-2 weeks (2-3 times this week) attributed to gait instability due to stiffness and feeling "frozen". Due to these falls, he has been more immobile and over last weekend, family noted increased LE edema for which he takes PRN lasix. Patient also endorses increase hallucination episodes (e.g. snakes in the house, having boots on when he didn't). In the ED, he was afebrile and hemodynamically stable. CTH is negative for acute intracranial pathology. LE duplex negative for DVT. He was evaluated by PT, OT, and PMR and  recommended for inpatient acute rehab. Neurology consulted for medication optimization for Parkinson's disease. Pt given lasix for b/l LE edema. He was admitted to [facility] On 11/8/24.       #Parkinson's Disease now with gait Instability, ADL impairments and Functional impairments  - Start Comprehensive Rehab Program of PT/OT/SLP    ------------------------------------------------------  Parkinson's related motor symptoms    #Rigidity/Bradykinesia/falls   -Continue Sinemet 25/250 mg 1 tab at 7am, 11am, 3pm, and 7pm with dose of entacapone 200mg at same timing  -Continue Sinemet 25/100mg CR 1 tab at 12am   -Mirapex 1mg TID - consider reducing dose   -Follows with Dr. Claudio   -Movement disorders following    --------------------------------------------------------  Parkinson's related non-motor symptoms    #Mood/memory/sleep  -Continue aricept 10mg daily   -Continue melatonin PRN   -Neuropsych  -Rec therapy     #Orthostatic hypotension  -Monitor OH vital signs  -Currently on metoprolol and flomax - monitor for continued need    #Pain control  - Tylenol PRN    #GI/Bowel Management/Constipation  - Continue Senna at bedtime   - Miralax daily    #Bladder management/BPH  - Continue to monitor PVR q 8 hours (SC if > 400)  -Monitor UO  -Continue flomax - monitor OH    ----------------------------------------------------------  Concurrent medical problems    #HTN  #CAD - s/p CABG and PCI in the past  -Continue metoprolol 100mg ER -monitor vitals  -EKG  -Niacin 500mg daily   -Crestor 10mg daily   -Continue aspirin 81mg daily     #bilateral lower extremity edema -with blister  - B/L lower extremity venous doppler (11/04/2024): negative for DVT  - s/p lasix 40mg po x 3 days as inpatient- monitor for need   - elevate legs daily  - ACE wrap for compression if needed    #DVT Prophylaxis  - Lovenox  - TEDs   -LE duplex 11/4 - negative for DVT    #Skin:  -Left leg blister - elevate leg and wrap with dry gauze if oozing - not currently oozing   -Offset pressure.     FEN   - Diet - DASH diet  - Dysphagia  SLP - evaluation and treatment    Precautions / PROPHYLAXIS:   - Falls, Cardiac  - ortho: Weight bearing status: WBAT   - Lungs: Aspiration  - Pressure injury/Skin:  OOB to Chair, PT/OT      Danilo Javier  Pulmonary Disease  [address] [place] [id number]  Phone: [phone]  Fax: [phone]  Follow Up Time: 2 weeks    MEDICAL PROGNOSIS: GOOD              REHAB POTENTIAL: GOOD              ESTIMATED DISPOSITION: HOME WITH HOME CARE              ELOS: 10-14 Days   EXPECTED THERAPY:     P.T. 1hr/day       O.T. 1hr/day      S.L.P. 1hr/day       P&O Unnecessary       EXP FREQUENCY: 5 days per 7 day period     PRESCREEN COMPARISON:   I have reviewed the prescreen information and I have found no relevant changes between the preadmission screening and my post admission evaluation     RATIONALE FOR INPATIENT ADMISSION - Patient demonstrates the following: (check all that apply)  [X] Medically appropriate for rehabilitation admission  [X] Has attainable rehab goals with an appropriate initial discharge plan  [X] Has rehabilitation potential (expected to make a significant improvement within a reasonable period of time)   [X] Requires close medical management by a rehab physician, rehab nursing care, Hospitalist and comprehensive interdisciplinary team (including PT, OT, & or SLP, Prosthetics and Orthotics)

## 2024-11-08 NOTE — PROGRESS NOTE ADULT - ATTENDING COMMENTS
Please see resident note for full details regarding the service.    PE: A+Ox3, no murmurs, lungs CTA b/l, abd NT/ND, no lower extremity swelling    Assessment:  - Recurrent falls and hallucinations   - Gait instability, functional impairment in the setting of Parkinson's disease   - Bilateral lower extremity edema   - History of HTN, HLD, CAD s/p PCI/CABG x 2 (last 8 years ago), BPH, Parkinson's Disease (dx 8 years ago, follows with Dr. Claudio)     Plan:    - Orthostatic negative   - Trial of lasix for lower extremity swelling (Day 2/3), leg elevation, compression  - Follow up ECHO result from outpatient cardiology office    - Fall precautions   - PMR: accepted to acute rehab   - Dr. Hendrix to evaluate the patient   - DVT ppx: Lovenox 40 mg subcutaneous daily   - Code status: Full code. [facility] discussion 11/5/24.   - Dispo: acute rehab when bed     I spent a total of 50 minutes on the date of this encounter coordinating the patient's care, excluding resident teaching time. This includes reviewing results/imaging and discussions with specialists, nursing, case management/social work. Further tests, medications, and procedures have been ordered as indicated. Results and the plan of care were communicated to the patient and/or their family member. Supporting documentation was completed and added to the patient's chart.
74 year old male with past medical history of  HTN, HLD, CAD s/p PCI/CABG x 2 , BPH, Parkinson's Disease admitted with frequent falls and hallucinations.   No acute evnts overnight.    T(C): 36.3 (11-08-24 @ 11:33), Max: 36.6 (11-08-24 @ 05:30)  T(F): 97.3 (11-08-24 @ 11:33), Max: 97.9 (11-08-24 @ 05:30)  HR: 65 (11-08-24 @ 11:33) (65 - 73)  BP: 119/73 (11-08-24 @ 11:33) (119/73 - 158/84)  ABP: --  ABP(mean): --  RR: 17 (11-08-24 @ 11:33) (16 - 17)  SpO2: 97% (11-08-24 @ 11:33) (96% - 97%)      on exam aaox3, no apparent distress, both lungs clear, s1, [id number], abdomen- soft, left lower leg with superficial skin peel, no surrounding erythema/ cellulitis                           14.6   7.80  )-----------( 139      ( 08 Nov 2024 06:46 )             43.3   11-08    138  |  102  |  30[H]  ----------------------------<  84  4.4   |  29  |  1.40[H]    Ca    9.3      08 Nov 2024 06:46  Phos  3.2     11-08  Mg     1.8     11-08    TPro  7.2  /  Alb  3.5  /  TBili  0.9  /  DBili  x   /  AST  15  /  ALT  10  /  AlkPhos  78  11-08      a/p:  # Frequent falls, hallucination, underlying h/o  HTN, HLD, CAD s/p PCI/CABG x 2 , BPH, Parkinson's Disease  # TOYA- NS bolus, monitor.   - no evidence of any acute infection. continue home meds for chronic condition. discharge to rehab when bed becomes available.  - rest as per resident note .
Please see resident note for full details regarding the service.    PE: A+Ox3, no murmurs, lungs CTA b/l, abd NT/ND, no lower extremity swelling    Assessment:  - Recurrent falls and hallucinations   - Gait instability, functional impairment in the setting of Parkinson's disease   - Bilateral lower extremity edema   - History of HTN, HLD, CAD s/p PCI/CABG x 2 (last 8 years ago), BPH, Parkinson's Disease (dx 8 years ago, follows with Dr. Claudio)     Plan:    - BP range 156//74, monitor closely -> will order orthostatic vital signs   - K+ 5.8 (likely hemolyzed) -> 3.7 on repeat   - EKG: Sinus bradycardia, normal intervals, no ST changes (personally reviewed).   - CT head: 1)  generalized volume loss and involutional change, with no acute abnormality suggested. 2)  no intracerebral hemorrhage, contusion, or hemorrhagic collections identified.  - CXR: sternotomy wires, no consolidation, no effusion, no pneumothorax (personally reviewed).   - Ultrasound doppler b/l lower extremities: No evidence of deep venous thrombosis in either lower extremity.  - Does not meet sepsis criteria; doubt infectious etiology   - Trial of lasix for lower extremity swelling, leg elevation, compression  - Follow up ECHO result from outpatient cardiology office    - Fall precautions   - PT/OT/PMR evaluations   - DVT ppx: Lovenox 40 mg subcutaneous daily   - Code status: Full code. Will confirm GOC.   - Dispo: Pending PT/OT/PMR consults.     I spent a total of 50 minutes on the date of this encounter coordinating the patient's care, excluding resident teaching time. This includes reviewing results/imaging and discussions with specialists, nursing, case management/social work. Further tests, medications, and procedures have been ordered as indicated. Results and the plan of care were communicated to the patient and/or their family member. Supporting documentation was completed and added to the patient's chart.
74 year old male with past medical history of  HTN, HLD, CAD s/p PCI/CABG x 2 , BPH, Parkinson's Disease admitted with frequent falls and hallucinations.   No acute evnts overnight.  T(C): 36.3 (11-07-24 @ 12:32), Max: 36.6 (11-07-24 @ 05:01)  T(F): 97.3 (11-07-24 @ 12:32), Max: 97.9 (11-07-24 @ 05:01)  HR: 71 (11-07-24 @ 12:32) (71 - 77)  BP: 127/75 (11-07-24 @ 12:32) (127/75 - 144/83)  ABP: --  ABP(mean): --  RR: 16 (11-07-24 @ 12:32) (16 - 16)  SpO2: 96% (11-07-24 @ 12:32) (96% - 96%)    on exam aaox3, no apparent distress, both lungs clear, s1, [id number], abdomen- soft               14.3   7.99  )-----------( 129      ( 07 Nov 2024 07:52 )             42.3   11-07    139  |  104  |  23  ----------------------------<  93  4.1   |  29  |  1.06    Ca    9.4      07 Nov 2024 07:52  Phos  3.2     11-06  Mg     1.9     11-06    TPro  7.1  /  Alb  3.5  /  TBili  1.5[H]  /  DBili  x   /  AST  17  /  ALT  14  /  AlkPhos  69  11-07    a/p:  # Frequent falls, hallucination, underlying h/o  HTN, HLD, CAD s/p PCI/CABG x 2 , BPH, Parkinson's Disease  - no evidence of any acute infection. continue home meds for chronic condition. discharge to rehab when bed becomes available.  - rest as per resident note

## 2024-11-08 NOTE — H&P ADULT - NSHPREVIEWOFSYSTEMS_GEN_ALL_CORE
REVIEW OF SYSTEMS  Constitutional: No fever, No Chills, No fatigue  HEENT: No eye pain, No visual disturbances, No difficulty hearing  Pulm: No cough,  No shortness of breath  Cardio: No chest pain, No palpitations  GI:  No abdominal pain, No nausea, No vomiting, No diarrhea, +constipation  : No dysuria, No frequency, No hematuria  Neuro: No headaches, No memory loss, +loss of strength, No numbness, +tremors, +hallucinations - nonthreatening   Skin: No itching, No rashes, No lesions   Endo: No temperature intolerance  MSK: No joint pain, No joint swelling, No muscle pain, No Neck or back pain  Psych:  No depression, No anxiety

## 2024-11-08 NOTE — PROGRESS NOTE ADULT - TIME BILLING
This includes reviewing results/imaging and discussions with specialists, nursing, case management/social work. Further tests, medications, and procedures have been ordered as indicated. Results and the plan of care were communicated to the patient and/or their family member. Supporting documentation was completed and added to the patient's chart.
This includes reviewing results/imaging and discussions with specialists, nursing, case management/social work. Further tests, medications, and procedures have been ordered as indicated. Results and the plan of care were communicated to the patient and/or their family member. Supporting documentation was completed and added to the patient's chart.

## 2024-11-08 NOTE — PROGRESS NOTE ADULT - ASSESSMENT
Patient is a 74 year old male with past medical history of HTN, HLD, CAD s/p PCI/CABG x 2, BPH, and Parkinson's who presented with recurrent falls and hallucinations. Admitted for further evaluation and referral for Parkinson's rehab.     #recurrent falls  #gait instability  #hallucinations  #History of Parkinson's Disease  - CT head (11/04) negative for acute intracranial abnormality.   - Chest X-ray (11/04) negative for acute cardiopulmonary disease.   - no head trauma per pt and no signs on examination  - UA negative  - fall precautions  - c/w Sinemet  - c/w entacapone  - c/w donepezil  - c/w pramiprexole  - PT/OT & PMR recommended acute inpatient rehab  - neurology consult- Dr. Hendrix recommended inpatient rehab/ Parkinson's program  - pending inpatient rehab bed availability     #bilateral lower extremity edema  - B/L lower extremity venous doppler (11/04/2024): negative for DVT  - s/p lasix 40mg po x 3 days  - Cr wnl  - elevate legs daily  - ACE wrap for compression if needed    #constipation  - per pt LBM was a few days ago  - miralax and senna added     #thrombocytopenia, chronic, stable  - plt 134k > 129k> 132k > 129k 11/07/2024  - monitor for now    #HTN  - c/w metoprolol 100mg    #HLD  - c/w rosuvastatin 10mg  - c/w niacin 500mg     #CAD s/p PCI/CABG x 2  - c/w ASA 81mg     #BPH  - c/w flomax    #DVT ppx  - Lovenox    #Diet  - DASH/TLC    #GOC  - full code     *Case seen and discussed with Dr. Warren Patient is a 74 year old male with past medical history of HTN, HLD, CAD s/p PCI/CABG x 2, BPH, and Parkinson's who presented with recurrent falls and hallucinations. Admitted for further evaluation and referral for Parkinson's rehab.     #recurrent falls  #gait instability  #hallucinations  #History of Parkinson's Disease  - CT head (11/04) negative for acute intracranial abnormality.   - Chest X-ray (11/04) negative for acute cardiopulmonary disease.   - no head trauma per pt and no signs on examination  - UA negative  - fall precautions  - c/w Sinemet  - c/w entacapone  - c/w donepezil  - c/w pramiprexole  - PT/OT & PMR recommended acute inpatient rehab  - neurology consult- Dr. Hendrix recommended inpatient rehab/ Parkinson's program    #bilateral lower extremity edema, improving  - B/L lower extremity venous doppler (11/04/2024): negative for DVT  - s/p lasix 40mg po x 3 days  - elevate legs daily  - noted to have drained blister with associated pain  - start bacitracin prn   - ACE wrap for compression if needed    #TOYA  - likely due to lasix use  - Cr 1.4 11/8/2024  - start 1L bolus  - monitor BMP    #constipation, improved  - pt reports BM  - c/w bowel regimen    #thrombocytopenia, chronic, stable  - plt 139k 11/08/2024  - monitor for now    #HTN  - c/w metoprolol 100mg    #HLD  - c/w rosuvastatin 10mg  - c/w niacin 500mg     #CAD s/p PCI/CABG x 2  - c/w ASA 81mg     #BPH  - c/w flomax    #DVT ppx  - Lovenox    #Diet  - DASH/TLC    #GOC  - full code     *Case seen and discussed with Dr. Warren

## 2024-11-08 NOTE — DISCHARGE NOTE NURSING/CASE MANAGEMENT/SOCIAL WORK - NSDCPEFALRISK_GEN_ALL_CORE
For information on Fall & Injury Prevention, visit: [email or web address] OR  [email or web address] OR  https://www.cdc.gov/steadi/patient.html

## 2024-11-08 NOTE — PROGRESS NOTE ADULT - REASON FOR ADMISSION
Frequent Falls, Hallucinations

## 2024-11-08 NOTE — H&P ADULT - NS ATTEND AMEND GEN_ALL_CORE FT
74 year old man, h/o HTN, CAD, with functional deficits due to PD, gait impairment, falls  patient to continue with sinemet, movement disorders to see patient   HTN/orthostatic hypotension, monitor with therapy  LE edema, ACE wrap, elevation, received lasix as inpatient   patient admitted for full rehab program, seen today, progress note with exam, labs

## 2024-11-08 NOTE — DISCHARGE NOTE NURSING/CASE MANAGEMENT/SOCIAL WORK - NSDCFUADDAPPT_GEN_ALL_CORE_FT
D/c to [facility] acute rehab room 301    APPTS ARE READY TO BE MADE: [X] YES    Best Family or Patient Contact (if needed):    Additional Information about above appointments (if needed):    1: PCP  2: Neurology  3:     Other comments or requests:

## 2024-11-08 NOTE — DISCHARGE NOTE NURSING/CASE MANAGEMENT/SOCIAL WORK - PATIENT PORTAL LINK FT
You can access the FollowMyHealth Patient Portal offered by [facility] by registering at the following website: [email or web address]. By joining [facility]’s FollowMyHealth portal, you will also be able to view your health information using other applications (apps) compatible with our system.

## 2024-11-09 LAB
ALBUMIN SERPL ELPH-MCNC: 3.4 G/DL — SIGNIFICANT CHANGE UP (ref 3.3–5)
ALP SERPL-CCNC: 77 U/L — SIGNIFICANT CHANGE UP (ref 40–120)
ALT FLD-CCNC: 8 U/L — LOW (ref 10–45)
ANION GAP SERPL CALC-SCNC: 8 MMOL/L — SIGNIFICANT CHANGE UP (ref 5–17)
AST SERPL-CCNC: 15 U/L — SIGNIFICANT CHANGE UP (ref 10–40)
BASOPHILS # BLD AUTO: 0.07 K/UL — SIGNIFICANT CHANGE UP (ref 0–0.2)
BASOPHILS NFR BLD AUTO: 0.9 % — SIGNIFICANT CHANGE UP (ref 0–2)
BILIRUB SERPL-MCNC: 0.9 MG/DL — SIGNIFICANT CHANGE UP (ref 0.2–1.2)
BUN SERPL-MCNC: 27 MG/DL — HIGH (ref 7–23)
CALCIUM SERPL-MCNC: 8.8 MG/DL — SIGNIFICANT CHANGE UP (ref 8.4–10.5)
CHLORIDE SERPL-SCNC: 103 MMOL/L — SIGNIFICANT CHANGE UP (ref 96–108)
CO2 SERPL-SCNC: 25 MMOL/L — SIGNIFICANT CHANGE UP (ref 22–31)
CREAT SERPL-MCNC: 1.31 MG/DL — HIGH (ref 0.5–1.3)
EGFR: 57 ML/MIN/1.73M2 — LOW
EOSINOPHIL # BLD AUTO: 0.18 K/UL — SIGNIFICANT CHANGE UP (ref 0–0.5)
EOSINOPHIL NFR BLD AUTO: 2.3 % — SIGNIFICANT CHANGE UP (ref 0–6)
GLUCOSE SERPL-MCNC: 107 MG/DL — HIGH (ref 70–99)
HCT VFR BLD CALC: 43.4 % — SIGNIFICANT CHANGE UP (ref 39–50)
HGB BLD-MCNC: 14.6 G/DL — SIGNIFICANT CHANGE UP (ref 13–17)
IMM GRANULOCYTES NFR BLD AUTO: 0.5 % — SIGNIFICANT CHANGE UP (ref 0–0.9)
LYMPHOCYTES # BLD AUTO: 1.89 K/UL — SIGNIFICANT CHANGE UP (ref 1–3.3)
LYMPHOCYTES # BLD AUTO: 23.7 % — SIGNIFICANT CHANGE UP (ref 13–44)
MCHC RBC-ENTMCNC: 28.3 PG — SIGNIFICANT CHANGE UP (ref 27–34)
MCHC RBC-ENTMCNC: 33.6 G/DL — SIGNIFICANT CHANGE UP (ref 32–36)
MCV RBC AUTO: 84.1 FL — SIGNIFICANT CHANGE UP (ref 80–100)
MONOCYTES # BLD AUTO: 0.75 K/UL — SIGNIFICANT CHANGE UP (ref 0–0.9)
MONOCYTES NFR BLD AUTO: 9.4 % — SIGNIFICANT CHANGE UP (ref 2–14)
NEUTROPHILS # BLD AUTO: 5.03 K/UL — SIGNIFICANT CHANGE UP (ref 1.8–7.4)
NEUTROPHILS NFR BLD AUTO: 63.2 % — SIGNIFICANT CHANGE UP (ref 43–77)
NRBC # BLD: 0 /100 WBCS — SIGNIFICANT CHANGE UP (ref 0–0)
PLATELET # BLD AUTO: 134 K/UL — LOW (ref 150–400)
POTASSIUM SERPL-MCNC: 4.1 MMOL/L — SIGNIFICANT CHANGE UP (ref 3.5–5.3)
POTASSIUM SERPL-SCNC: 4.1 MMOL/L — SIGNIFICANT CHANGE UP (ref 3.5–5.3)
PROT SERPL-MCNC: 7.1 G/DL — SIGNIFICANT CHANGE UP (ref 6–8.3)
RBC # BLD: 5.16 M/UL — SIGNIFICANT CHANGE UP (ref 4.2–5.8)
RBC # FLD: 15.2 % — HIGH (ref 10.3–14.5)
SODIUM SERPL-SCNC: 136 MMOL/L — SIGNIFICANT CHANGE UP (ref 135–145)
WBC # BLD: 7.96 K/UL — SIGNIFICANT CHANGE UP (ref 3.8–10.5)
WBC # FLD AUTO: 7.96 K/UL — SIGNIFICANT CHANGE UP (ref 3.8–10.5)

## 2024-11-09 PROCEDURE — 99222 1ST HOSP IP/OBS MODERATE 55: CPT

## 2024-11-09 PROCEDURE — 99223 1ST HOSP IP/OBS HIGH 75: CPT

## 2024-11-09 PROCEDURE — 93010 ELECTROCARDIOGRAM REPORT: CPT

## 2024-11-09 RX ADMIN — Medication 500 MILLIGRAM(S): at 21:54

## 2024-11-09 RX ADMIN — CARBIDOPA AND LEVODOPA 1 TABLET(S): 10; 100 TABLET ORAL at 18:40

## 2024-11-09 RX ADMIN — ENOXAPARIN SODIUM 40 MILLIGRAM(S): 30 INJECTION SUBCUTANEOUS at 06:40

## 2024-11-09 RX ADMIN — PRAMIPEXOLE 1 MILLIGRAM(S): 1.5 TABLET, EXTENDED RELEASE ORAL at 06:41

## 2024-11-09 RX ADMIN — METOPROLOL TARTRATE 100 MILLIGRAM(S): 100 TABLET, FILM COATED ORAL at 06:41

## 2024-11-09 RX ADMIN — CARBIDOPA AND LEVODOPA 1 TABLET(S): 10; 100 TABLET ORAL at 23:53

## 2024-11-09 RX ADMIN — ENTACAPONE 200 MILLIGRAM(S): 200 TABLET, FILM COATED ORAL at 11:20

## 2024-11-09 RX ADMIN — TAMSULOSIN HYDROCHLORIDE 0.4 MILLIGRAM(S): 0.4 CAPSULE ORAL at 21:54

## 2024-11-09 RX ADMIN — CARBIDOPA AND LEVODOPA 1 TABLET(S): 10; 100 TABLET ORAL at 06:45

## 2024-11-09 RX ADMIN — ENTACAPONE 200 MILLIGRAM(S): 200 TABLET, FILM COATED ORAL at 18:40

## 2024-11-09 RX ADMIN — Medication 2 TABLET(S): at 21:54

## 2024-11-09 RX ADMIN — ENTACAPONE 200 MILLIGRAM(S): 200 TABLET, FILM COATED ORAL at 06:41

## 2024-11-09 RX ADMIN — ENTACAPONE 200 MILLIGRAM(S): 200 TABLET, FILM COATED ORAL at 14:54

## 2024-11-09 RX ADMIN — CARBIDOPA AND LEVODOPA 1 TABLET(S): 10; 100 TABLET ORAL at 11:20

## 2024-11-09 RX ADMIN — ROSUVASTATIN CALCIUM 10 MILLIGRAM(S): 5 TABLET, FILM COATED ORAL at 21:54

## 2024-11-09 RX ADMIN — DONEPEZIL HYDROCHLORIDE 10 MILLIGRAM(S): 5 TABLET, FILM COATED ORAL at 21:54

## 2024-11-09 RX ADMIN — PRAMIPEXOLE 1 MILLIGRAM(S): 1.5 TABLET, EXTENDED RELEASE ORAL at 14:54

## 2024-11-09 RX ADMIN — CARBIDOPA AND LEVODOPA 1 TABLET(S): 10; 100 TABLET ORAL at 14:54

## 2024-11-09 RX ADMIN — POLYETHYLENE GLYCOL 3350 17 GRAM(S): 17 POWDER, FOR SOLUTION ORAL at 11:21

## 2024-11-09 RX ADMIN — Medication 81 MILLIGRAM(S): at 21:54

## 2024-11-09 RX ADMIN — PRAMIPEXOLE 1 MILLIGRAM(S): 1.5 TABLET, EXTENDED RELEASE ORAL at 21:54

## 2024-11-09 NOTE — DIETITIAN INITIAL EVALUATION ADULT - OTHER INFO
H&P: 75 y/o M with PMH of HTN, HLD, CAD s/p PCI/CABG x 2 (last 8 years ago), BPH, Parkinson's Disease (dx 8 years ago) who presented to [facility] on 11/6 from home for frequent falls and hallucinations. He has history of occasional falls with increased frequency over the last 1-2 weeks (2-3 times this week) attributed to gait instability due to stiffness and feeling "frozen". Due to these falls, he has been more immobile and over last weekend, family noted increased LE edema for which he takes PRN lasix. Patient also endorses increase hallucination episodes (e.g. snakes in the house, having boots on when he didn't). In the ED, he was afebrile and hemodynamically stable. CTH is negative for acute intracranial pathology. LE duplex negative for DVT. He was evaluated by PT, OT, and PMR and  recommended for inpatient acute rehab. Neurology consulted for medication optimization for Parkinson's disease. Pt given lasix for b/l LE edema.    Pt reports good appetite. PO intake 100%. Pt feeds self, reports no chewing/swallowing difficulties. NKFA. Denies N/V/C/D. Last BM: 11/07. UBW: 214 Lbs, current weight 213.8 Lbs. Edema + 2 on b/l leg and R foot . Skin w/ PI stage 2 on L lower ankle. Explained benefits of Gene and DASH/TLC diet. Pt was agreeable with recommendations. No muscle/fat wasting noted.

## 2024-11-09 NOTE — PROGRESS NOTE ADULT - SUBJECTIVE AND OBJECTIVE BOX
rehab follow up note  CC:  Parkinson's     no new complaints this morning  participating with therapy         REVIEW OF SYSTEMS  Constitutional - No fever,  No fatigue  Neurological - No headaches, No loss of strength  Musculoskeletal - No joint pain, No joint swelling, No muscle pain    VITALS  T(C): 36.9 (11-09-24 @ 07:29), Max: 37 (11-08-24 @ 20:25)  HR: 61 (11-09-24 @ 07:29) (61 - 68)  BP: 159/84 (11-09-24 @ 07:29) (126/77 - 159/84)  RR: 16 (11-09-24 @ 07:29) (16 - 16)  SpO2: 98% (11-09-24 @ 07:29) (98% - 99%)  Wt(kg): --       MEDICATIONS   acetaminophen     Tablet .. 650 milliGRAM(s) every 6 hours PRN  aspirin enteric coated 81 milliGRAM(s) at bedtime  carbidopa/levodopa  25/250 1 Tablet(s) <User Schedule>  carbidopa/levodopa CR 25/100 1 Tablet(s) <User Schedule>  donepezil 10 milliGRAM(s) at bedtime  enoxaparin Injectable 40 milliGRAM(s) every 24 hours  entacapone 200 milliGRAM(s) <User Schedule>  melatonin 3 milliGRAM(s) at bedtime PRN  metoprolol succinate  milliGRAM(s) daily  niacin  milliGRAM(s) at bedtime  polyethylene glycol 3350 17 Gram(s) daily  pramipexole 1 milliGRAM(s) three times a day  rosuvastatin 10 milliGRAM(s) at bedtime  senna 2 Tablet(s) at bedtime  tamsulosin 0.4 milliGRAM(s) at bedtime      RECENT LABS/IMAGING                        14.6   7.96  )-----------( 134      ( 09 Nov 2024 07:15 )             43.4     11-09    136  |  103  |  27[H]  ----------------------------<  107[H]  4.1   |  25  |  1.31[H]    Ca    8.8      09 Nov 2024 07:15  Phos  3.2     11-08  Mg     1.8     11-08    TPro  7.1  /  Alb  3.4  /  TBili  0.9  /  DBili  x   /  AST  15  /  ALT  8[L]  /  AlkPhos  77  11-09      Urinalysis Basic - ( 09 Nov 2024 07:15 )    Color: x / Appearance: x / SG: x / pH: x  Gluc: 107 mg/dL / Ketone: x  / Bili: x / Urobili: x   Blood: x / Protein: x / Nitrite: x   Leuk Esterase: x / RBC: x / WBC x   Sq Epi: x / Non Sq Epi: x / Bacteria: x                ---------  PHYSICAL EXAM  Constitutional - NAD, Comfortable, in chair   Pulm - Breathing comfortably on room air   Extremities -+ b/l LE edema, No calf tenderness  Neurologic Exam -                    Cognitive - Awake, Alert oriented x 3     Communication - Fluent     Motor - +rigidity, bradykinesia      Sensory - Intact to LT  Psychiatric - Mood WNL, Affect WNL    ASSESSMENT/PLAN  74y Male h/o HTN, CAD, with functional deficits due to Parkinson's, gait impairment, falls   on sinemet, entacapone, mirapex  mood/memory/sleep: aricept, melatonin at night   orthostatic hypotension/HTN on metoprolol, continue to monitor   BPH on flomax   routine TTE ordered by hospitalist, TSH   Continue current medical management  Pain - Tylenol PRN  DVT PPX - lovenox     Continue 3hrs a day of comprehensive rehab program.       35 minutes spent reviewing hospital course, relevant imaging, therapy notes, consultant notes, labs, imaging, examining patient, discussion with nurses/rehab team

## 2024-11-09 NOTE — DIETITIAN INITIAL EVALUATION ADULT - CALCULATED FROM (ML/KG)
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  Performance deficits affecting function  weakness, impaired endurance, impaired self care skills, impaired functional mobility, gait instability, impaired balance, decreased upper extremity function, decreased lower extremity function, decreased safety awareness, pain, decreased ROM, orthopedic precautions. Patient agreeable to PT evaluation and treatment this AM. Patient reports being Mod I using rollator for mobility and requiring assistance for tub transfers prior to recent hospitalization. Patient with spinal precautions and use of cervical collar following recent dens fracture in August 2023. Patient currently functioning below baseline level of mobility requiring ModA for sit to stand transfer and Heriberto for short distance ambulation. Pain in shoulders and generalized fatigue limiting tolerance for mobility assessment. Patient will benefit from continued SNF rehabilitation services to address above mentioned deficits as well as progress mobility towards PLOF and increased functional independence for safe transition to home environment at time of discharge.     Rehab Potential is good     Activity Tolerance: Fair    Plan:     Patient to be seen 5 x/week to address the above listed problems via gait  "training, therapeutic activities, therapeutic exercises, neuromuscular re-education, wheelchair management/training     Plan of Care Expires: 11/23/23  Plan of Care Reviewed with: patient, daughter    Subjective     Chief Complaint: "My shoulders hurt."  Patient/Family Comments/goals: Return home at OF  Pain/Comfort:  Pain Rating 1: 9/10  Location - Side 1: Bilateral  Location - Orientation 1: generalized  Location 1: shoulder  Pain Addressed 1: Pre-medicate for activity, Reposition, Distraction, Cessation of Activity  Pain Rating Post-Intervention 1: 9/10    Living Environment:   Patient resides home with youngest daughter (currently with broken foot in CAM boot) in a [facility] with ramp access for entry. Patient has a tub/shower combo with a shower chair.     Prior to admission, patients level of function was Mod I for mobility using rollator. Patient required assistance for transfers in/out of tub.   Equipment used at home: BSC, shower chair, W/C, rollator .  DME owned (not currently used): rolling walker and single point cane.  Upon discharge, patient will have assistance from youngest daughter.    Patients cultural, spiritual, Moravian conflicts given the current situation: no    Objective:     Communicated with nursing staff prior to session.  Patient found up in chair with cervical collar  upon PT entry to room. Daughter present in room.     Exams:  Cognitive Exam:  Patient is oriented to Person, Place, Time, and Situation  RLE ROM: Grossly WFL  RLE Strength: Grossly 4-/5 to 4/5  LLE ROM: Grossly WFL  LLE Strength: Grossly 3+/5 to 4-/5    Functional Mobility:   10/24/23 1507   Prior Functioning: Everyday Activities   Self Care Needed some help   Indoor Mobility (Ambulation) Independent   Stairs Not applicable   Functional Cognition Independent   Prior Device Use None of the given options  (rollator)   Functional Limitation in Range of Motion   Upper Extremity Impairment on both sides   Lower Extremity No " impairment   Mobility Devices Walker;Wheelchair (manual or electric)   Sit to Stand   Did they attempt the activity? Yes   Was the activity done independently? No   Assistance Needed Physical assistance  (ModA using RW)   Physical Assistance Level More than half   Was adaptive equipment used? Yes   CARE Score - Sit to Stand 2   Sit to Stand Discharge Goal   Discharge Goal 4   Chair/Bed-to-Chair Transfer   Did they attempt the activity? Yes   Was the activity done independently? No   Assistance Needed Touching assistance  (per OT)   Was adaptive equipment used? Yes   CARE Score - Chair/Bed-to-Chair Transfer 4   Tub/Shower Transfer   Did they attempt the activity? No   Reason if not Attempted Safety concerns   CARE Score - Tub/Shower Transfer 88   Car Transfer   Did they attempt the activity? No   Reason if not Attempted Safety concerns   CARE Score - Car Transfer 88   Walk 10 Feet   Did they attempt the activity? Yes   Was the activity done independently? No   Assistance Needed Physical assistance  (Patient ambulated 35 feet and 44 feet using RW with Heriberto. Seated rest break between trials. W/C in tow. Genu valgum noted LLE > RLE during ambulation. No LOB.)   Physical Assistance Level Less than half   Was adaptive equipment used? Yes   CARE Score - Walk 10 Feet 3   Walk 50 Feet with Two Turns   Did they attempt the activity? No   Reason if not Attempted Safety concerns   CARE Score - Walk 50 Feet with Two Turns 88   Walk 150 Feet   Did they attempt the activity? No   Reason if not Attempted Safety concerns   CARE Score - Walk 150 Feet 88   Walking 10 Feet on Uneven Surfaces   Did they attempt the activity? No   Reason if not Attempted Safety concerns   CARE Score - Walking 10 Feet on Uneven Surfaces 88   1 Step (Curb)   Did they attempt the activity? No   Reason if not Attempted Safety concerns   CARE Score - 1 Step (Curb) 88   4 Steps   Did they attempt the activity? No   Reason if not Attempted Safety concerns    CARE Score - 4 Steps 88   12 Steps   Did they attempt the activity? No   Reason if not Attempted Safety concerns   CARE Score - 12 Steps 88   Picking Up Object   Did they attempt the activity? No   Reason if not Attempted Safety concerns   CARE Score - Picking Up Object 88   OTHER   Uses a Wheelchair/Scooter? Yes   Wheel 50 Feet with Two Turns   Did they attempt the activity? No   Reason if not Attempted Activity not applicable  (Patient propelled W/C 35 feet with BUE/BLE. Fatigue limiting distance traveled.)   CARE Score - Wheel 50 Feet with Two Turns 9   Wheel 150 Feet   Did they attempt the activity? No   Reason if not Attempted Safety concerns   CARE Score - Wheel 150 Feet 88     Education:  Patient provided with daily orientation and goals of this PT session.  Patient educated to transfer to bedside chair/bedside commode/bathroom with RN/PCT present.   Patient educated on assistive device use, bed mobility training, Fall risk, gait training, home safety, plan of care, transfer training, and spinal precautions  by explanation and demonstration.   Patient Verbalized Understanding.  Time provided for therapeutic counseling and discussion of current health disposition. All questions answered to satisfaction, within scope of PT practice; voiced no other concerns. White board updated in patient's room, nursing staff notified of session.    Therapeutic Activities and Exercises:   LE ergometer x 4 minutes for LE strengthening, ROM, and endurance; patient requesting to discontinue activity secondary to fatigue.     Repeated functional transfers and additional gait training performed during session. See above chart for details.     AM-PAC 6 CLICK MOBILITY  Total Score:15     Patient left up in chair with call button in reach, nursing staff notified, and daughter present.    GOALS:   Multidisciplinary Problems       Physical Therapy Goals          Problem: Physical Therapy    Goal Priority Disciplines Outcome Goal  Variances Interventions   Physical Therapy Goal     PT, PT/OT Ongoing, Progressing     Description: Goals to be met by: 23     Patient will increase functional independence with mobility by performin. Supine to sit with Modified [place]  2. Sit to supine with Modified [place]  3. Rolling to Left and Right with Modified [place]  4. Sit to stand transfer with Stand-by Assistance  5. Bed to chair transfer with Stand-by Assistance using Rolling Walker  6. Gait  x 150 feet with Stand-by Assistance using Rolling Walker  7. Wheelchair propulsion x 100 feet with Supervision using bilateral upper extremities/bilateral lower extremities                         History:     Past Medical History:   Diagnosis Date    Allergy     Anemia, unspecified     Arthritis     CKD (chronic kidney disease) stage 4, GFR 15-29 ml/min     COPD (chronic obstructive pulmonary disease)     Edema     HTN (hypertension)     Hypocalcemia     Hyponatremia     Osteoarthritis        Past Surgical History:   Procedure Laterality Date    BREAST CYST EXCISION      CAUDAL EPIDURAL STEROID INJECTION N/A 2023    Procedure: Injection-steroid-epidural-caudal;  Surgeon: Angel Sparrow MD;  Location: [facility];  Service: Pain Management;  Laterality: N/A;    FOOT SURGERY         Time Tracking:     PT Received On: 10/24/23  PT Start Time: 908     PT Stop Time: 956  PT Total Time (min): 48 min     Billable Minutes: Evaluation 20, Gait Training 10, and Therapeutic Activity 18      10/24/2023   2075

## 2024-11-09 NOTE — CONSULT NOTE ADULT - SUBJECTIVE AND OBJECTIVE BOX
HPI:   74 year old male with PMH of HTN, HLD, CAD s/p PCI/CABG x 2, BPH, Parkinson's Disease who presented to [facility] on 11/6 from home for frequent falls and hallucinations. Falls have been increasing in frequency, now occurring 2-3x a week attributed to gait instability due to stiffness and feeling "frozen". The falls have led him to become more immobile leading to worsening LE swelling. Patient also endorses increase hallucination episodes (e.g. snakes in the house). Medical workup significant for TOYA, negative for infection and acute intracranial pathology. S/P lasix for b/l LE edema. He was subsequently referred to [facility] for acute inpatient rehab On 11/8/24.     PAST MEDICAL & SURGICAL HISTORY:  H/O Parkinson's disease      CAD (coronary artery disease)      History of BPH      S/P CABG (coronary artery bypass graft)          Review of Systems:   CONSTITUTIONAL: No fever, weight loss, or fatigue  EYES: No eye pain, visual disturbances, or discharge  ENMT:  No difficulty hearing, tinnitus, vertigo; No sinus or throat pain  NECK: No pain or stiffness  RESPIRATORY: No cough, wheezing, chills or hemoptysis; No shortness of breath  CARDIOVASCULAR: No chest pain, palpitations, dizziness, or leg swelling  GASTROINTESTINAL: No abdominal or epigastric pain. No nausea, vomiting, or hematemesis; No diarrhea or constipation. No melena or hematochezia.  GENITOURINARY: No dysuria, frequency, hematuria, or incontinence  NEUROLOGICAL: + falls and loss, loss of strength, No headaches, memory, numbness, or tremors  SKIN: No itching, burning, rashes, or lesions   LYMPH NODES: No enlarged glands  ENDOCRINE: No heat or cold intolerance; No hair loss  MUSCULOSKELETAL: No joint pain or swelling; No muscle, back, or extremity pain  HEME/LYMPH: No easy bruising, or bleeding gums  ALLERY AND IMMUNOLOGIC: No hives or eczema    Allergies    No Known Allergies    Intolerances        Social History:     FAMILY HISTORY:  FH: leukemia (Father)    FH: CAD (coronary artery disease) (Mother)        MEDICATIONS  (STANDING):  aspirin enteric coated 81 milliGRAM(s) Oral at bedtime  carbidopa/levodopa  25/250 1 Tablet(s) Oral <User Schedule>  carbidopa/levodopa CR 25/100 1 Tablet(s) Oral <User Schedule>  donepezil 10 milliGRAM(s) Oral at bedtime  enoxaparin Injectable 40 milliGRAM(s) SubCutaneous every 24 hours  entacapone 200 milliGRAM(s) Oral <User Schedule>  metoprolol succinate  milliGRAM(s) Oral daily  niacin  milliGRAM(s) Oral at bedtime  polyethylene glycol 3350 17 Gram(s) Oral daily  pramipexole 1 milliGRAM(s) Oral three times a day  rosuvastatin 10 milliGRAM(s) Oral at bedtime  senna 2 Tablet(s) Oral at bedtime  tamsulosin 0.4 milliGRAM(s) Oral at bedtime    MEDICATIONS  (PRN):  acetaminophen     Tablet .. 650 milliGRAM(s) Oral every 6 hours PRN Mild Pain (1 - 3)  melatonin 3 milliGRAM(s) Oral at bedtime PRN Insomnia      Vital Signs Last 24 Hrs  T(C): 36.9 (09 Nov 2024 07:29), Max: 37 (08 Nov 2024 20:25)  T(F): 98.5 (09 Nov 2024 07:29), Max: 98.6 (08 Nov 2024 20:25)  HR: 61 (09 Nov 2024 07:29) (61 - 68)  BP: 159/84 (09 Nov 2024 07:29) (119/73 - 159/84)  BP(mean): --  RR: 16 (09 Nov 2024 07:29) (16 - 17)  SpO2: 98% (09 Nov 2024 07:29) (97% - 99%)    Parameters below as of 09 Nov 2024 07:29  Patient On (Oxygen Delivery Method): room air      CAPILLARY BLOOD GLUCOSE            PHYSICAL EXAM:  GENERAL: NAD  HEAD:  Atraumatic, Normocephalic  EYES: EOMI, conjunctiva and sclera clear  NECK: Supple, No JVD  CHEST/LUNG: Clear to auscultation bilaterally; No wheeze  HEART: Regular rate and rhythm; No murmurs, rubs, or gallops  ABDOMEN: Soft, Nontender, Nondistended; Bowel sounds present  EXTREMITIES:  2+ Peripheral Pulses, No clubbing, cyanosis, or edema  NEUROLOGY: non-focal  SKIN: No rashes or lesions    LABS:                        14.6   7.96  )-----------( 134      ( 09 Nov 2024 07:15 )             43.4     11-09    136  |  103  |  27[H]  ----------------------------<  107[H]  4.1   |  25  |  1.31[H]    Ca    8.8      09 Nov 2024 07:15  Phos  3.2     11-08  Mg     1.8     11-08    TPro  7.1  /  Alb  3.4  /  TBili  0.9  /  DBili  x   /  AST  15  /  ALT  8[L]  /  AlkPhos  77  11-09          Urinalysis Basic - ( 09 Nov 2024 07:15 )    Color: x / Appearance: x / SG: x / pH: x  Gluc: 107 mg/dL / Ketone: x  / Bili: x / Urobili: x   Blood: x / Protein: x / Nitrite: x   Leuk Esterase: x / RBC: x / WBC x   Sq Epi: x / Non Sq Epi: x / Bacteria: x        EKG(personally reviewed):    RADIOLOGY & ADDITIONAL TESTS:    Imaging Personally Reviewed:    Consultant(s) Notes Reviewed:      Care Discussed with Consultants/Other Providers:

## 2024-11-09 NOTE — DIETITIAN INITIAL EVALUATION ADULT - ORAL INTAKE PTA/DIET HISTORY
Pt reported PTA consumed a regular diet without restrictions. Pt had good appetite. NKFA. Pt eats 3 meals a day. Pt does not take supplements or vitamins.

## 2024-11-09 NOTE — DIETITIAN INITIAL EVALUATION ADULT - NSFNSPHYEXAMSKINFT_GEN_A_CORE
Pressure Injury 1: Left:, lower ankle, Stage II  Pressure Injury 2: none, none  Pressure Injury 3: none, none  Pressure Injury 4: none, none  Pressure Injury 5: none, none  Pressure Injury 6: none, none  Pressure Injury 7: none, none  Pressure Injury 8: none, none  Pressure Injury 9: none, none  Pressure Injury 10: none, none  Pressure Injury 11: none, none

## 2024-11-09 NOTE — DIETITIAN INITIAL EVALUATION ADULT - ADD RECOMMEND
Suggest adding MVI and Vit C to promote wound healing  Honor pt's food preferences as feasible with prescribed diet.   Obtain and record PO intake and weights daily

## 2024-11-09 NOTE — DIETITIAN INITIAL EVALUATION ADULT - REASON
Nutrition-focused physical examination deferred at this time - Pt reports stable wt hx, and good PO intake PTA. No overt signs of fat/muscle wasting visually observed.

## 2024-11-09 NOTE — DIETITIAN INITIAL EVALUATION ADULT - PERTINENT LABORATORY DATA
11-09    136  |  103  |  27[H]  ----------------------------<  107[H]  4.1   |  25  |  1.31[H]    Ca    8.8      09 Nov 2024 07:15  Phos  3.2     11-08  Mg     1.8     11-08    TPro  7.1  /  Alb  3.4  /  TBili  0.9  /  DBili  x   /  AST  15  /  ALT  8[L]  /  AlkPhos  77  11-09

## 2024-11-10 PROCEDURE — 99232 SBSQ HOSP IP/OBS MODERATE 35: CPT

## 2024-11-10 PROCEDURE — 93306 TTE W/DOPPLER COMPLETE: CPT | Mod: 26

## 2024-11-10 RX ORDER — GUAIFENESIN 400 MG
100 TABLET ORAL EVERY 6 HOURS
Refills: 0 | Status: DISCONTINUED | OUTPATIENT
Start: 2024-11-10 | End: 2024-11-10

## 2024-11-10 RX ADMIN — Medication 81 MILLIGRAM(S): at 21:31

## 2024-11-10 RX ADMIN — ROSUVASTATIN CALCIUM 10 MILLIGRAM(S): 5 TABLET, FILM COATED ORAL at 21:31

## 2024-11-10 RX ADMIN — METOPROLOL TARTRATE 100 MILLIGRAM(S): 100 TABLET, FILM COATED ORAL at 06:56

## 2024-11-10 RX ADMIN — Medication 2 TABLET(S): at 21:32

## 2024-11-10 RX ADMIN — TAMSULOSIN HYDROCHLORIDE 0.4 MILLIGRAM(S): 0.4 CAPSULE ORAL at 21:31

## 2024-11-10 RX ADMIN — PRAMIPEXOLE 1 MILLIGRAM(S): 1.5 TABLET, EXTENDED RELEASE ORAL at 15:15

## 2024-11-10 RX ADMIN — ENTACAPONE 200 MILLIGRAM(S): 200 TABLET, FILM COATED ORAL at 15:14

## 2024-11-10 RX ADMIN — ACETAMINOPHEN, DIPHENHYDRAMINE HCL, PHENYLEPHRINE HCL 3 MILLIGRAM(S): 325; 25; 5 TABLET ORAL at 21:31

## 2024-11-10 RX ADMIN — CARBIDOPA AND LEVODOPA 1 TABLET(S): 10; 100 TABLET ORAL at 23:56

## 2024-11-10 RX ADMIN — CARBIDOPA AND LEVODOPA 1 TABLET(S): 10; 100 TABLET ORAL at 15:14

## 2024-11-10 RX ADMIN — CARBIDOPA AND LEVODOPA 1 TABLET(S): 10; 100 TABLET ORAL at 18:53

## 2024-11-10 RX ADMIN — ENTACAPONE 200 MILLIGRAM(S): 200 TABLET, FILM COATED ORAL at 06:56

## 2024-11-10 RX ADMIN — ENTACAPONE 200 MILLIGRAM(S): 200 TABLET, FILM COATED ORAL at 18:53

## 2024-11-10 RX ADMIN — PRAMIPEXOLE 1 MILLIGRAM(S): 1.5 TABLET, EXTENDED RELEASE ORAL at 21:31

## 2024-11-10 RX ADMIN — CARBIDOPA AND LEVODOPA 1 TABLET(S): 10; 100 TABLET ORAL at 11:09

## 2024-11-10 RX ADMIN — Medication 500 MILLIGRAM(S): at 21:32

## 2024-11-10 RX ADMIN — ENOXAPARIN SODIUM 40 MILLIGRAM(S): 30 INJECTION SUBCUTANEOUS at 06:57

## 2024-11-10 RX ADMIN — PRAMIPEXOLE 1 MILLIGRAM(S): 1.5 TABLET, EXTENDED RELEASE ORAL at 06:56

## 2024-11-10 RX ADMIN — DONEPEZIL HYDROCHLORIDE 10 MILLIGRAM(S): 5 TABLET, FILM COATED ORAL at 21:32

## 2024-11-10 RX ADMIN — CARBIDOPA AND LEVODOPA 1 TABLET(S): 10; 100 TABLET ORAL at 07:02

## 2024-11-10 RX ADMIN — POLYETHYLENE GLYCOL 3350 17 GRAM(S): 17 POWDER, FOR SOLUTION ORAL at 11:09

## 2024-11-10 RX ADMIN — ENTACAPONE 200 MILLIGRAM(S): 200 TABLET, FILM COATED ORAL at 11:09

## 2024-11-10 NOTE — PROGRESS NOTE ADULT - ASSESSMENT
74 year old male with PMH of HTN, HLD, CAD s/p PCI/CABG x 2, BPH, Parkinson's Disease who presented to [facility] on 11/6 from home for frequent falls and hallucinations. Falls have been increasing in frequency, now occurring 2-3x a week attributed to gait instability due to stiffness and feeling "frozen". The falls have led him to become more immobile leading to worsening LE swelling. Patient also endorses increase hallucination episodes (e.g. snakes in the house). Medical workup significant for TOYA, negative for infection and acute intracranial pathology. S/P lasix for b/l LE edema. He was subsequently referred to [facility] for acute inpatient rehab On 11/8/24.   #Parkinson's Disease  #Rigidity/Bradykinesia/falls  -Comprehensive Rehab Program  -Continue Sinemet 25/250 mg 1 tab at 7am, 11am, 3pm, and 7pm with dose of entacapone 200mg at same timing  -Continue Sinemet 25/100mg CR 1 tab at 12am   -Mirapex 1mg TID  -aricept 10mg daily   -melatonin PRN   -further medication changes per Neuropsych  -fall precautions  -presents with falls and LE swelling with no recent echocardiogram in chart. TTE ordered, f/u official read  -f/u TSH in AM (add on not accepted)  -repeat EKG reviewed, normal sinus    #Orthostatic hypotension  -most recent orthostatics negative. however not completed while standing. negative on 11/5  -Monitor OH vital signs  -Currently on metoprolol and flomax - monitor for continued need    #Constipation  - Continue Senna at bedtime   - Miralax daily    #BPH  -monitor PVR q 8 hours (SC if > 400)  -Monitor UO  -Continue flomax    #HTN  #CAD - s/p remote CABG and PCI  -Continue metoprolol 100mg ER -monitor vitals  -Niacin 500mg daily   -Crestor 10mg daily   -Continue aspirin 81mg daily     #BL lower extremity edema -with blister  - B/L lower extremity venous doppler (11/04/2024): negative for DVT  - s/p lasix 40mg po x 3 days as inpatient - subsequent TOYA, would avoid further use for now and use conservative management  - elevate legs daily  - ACE wrap for compression if needed  -routine TTE, pending read    #Skin:  -Left leg blister - elevate leg and wrap with dry gauze if oozing - not currently oozing   -Offset pressure.     #DVT Prophylaxis  - Lovenox

## 2024-11-10 NOTE — PROGRESS NOTE ADULT - SUBJECTIVE AND OBJECTIVE BOX
rehab follow up note  CC: Parkinson's    no new complaints  slept well       REVIEW OF SYSTEMS  Constitutional - No fever,  No fatigue  Neurological - No headaches, No loss of strength  Musculoskeletal - No joint pain, No joint swelling, No muscle pain    VITALS  T(C): 36.9 (11-10-24 @ 07:38), Max: 36.9 (11-10-24 @ 07:38)  HR: 69 (11-10-24 @ 07:38) (69 - 69)  BP: 149/83 (11-10-24 @ 07:38) (149/83 - 149/83)  RR: 16 (11-10-24 @ 07:38) (16 - 16)  SpO2: 99% (11-10-24 @ 07:38) (98% - 99%)  Wt(kg): --       MEDICATIONS   acetaminophen     Tablet .. 650 milliGRAM(s) every 6 hours PRN  aspirin enteric coated 81 milliGRAM(s) at bedtime  carbidopa/levodopa  25/250 1 Tablet(s) <User Schedule>  carbidopa/levodopa CR 25/100 1 Tablet(s) <User Schedule>  donepezil 10 milliGRAM(s) at bedtime  enoxaparin Injectable 40 milliGRAM(s) every 24 hours  entacapone 200 milliGRAM(s) <User Schedule>  melatonin 3 milliGRAM(s) at bedtime PRN  metoprolol succinate  milliGRAM(s) daily  niacin  milliGRAM(s) at bedtime  polyethylene glycol 3350 17 Gram(s) daily  pramipexole 1 milliGRAM(s) three times a day  rosuvastatin 10 milliGRAM(s) at bedtime  senna 2 Tablet(s) at bedtime  tamsulosin 0.4 milliGRAM(s) at bedtime      RECENT LABS/IMAGING                        14.6   7.96  )-----------( 134      ( 09 Nov 2024 07:15 )             43.4     11-09    136  |  103  |  27[H]  ----------------------------<  107[H]  4.1   |  25  |  1.31[H]    Ca    8.8      09 Nov 2024 07:15    TPro  7.1  /  Alb  3.4  /  TBili  0.9  /  DBili  x   /  AST  15  /  ALT  8[L]  /  AlkPhos  77  11-09      Urinalysis Basic - ( 09 Nov 2024 07:15 )    Color: x / Appearance: x / SG: x / pH: x  Gluc: 107 mg/dL / Ketone: x  / Bili: x / Urobili: x   Blood: x / Protein: x / Nitrite: x   Leuk Esterase: x / RBC: x / WBC x   Sq Epi: x / Non Sq Epi: x / Bacteria: x                ---------  PHYSICAL EXAM  Constitutional - NAD, Comfortable, in chair   Pulm - Breathing comfortably on room air   Extremities -+ b/l LE edema, No calf tenderness  Neurologic Exam -                    Cognitive - Awake, Alert oriented x 3     Communication - Fluent     Motor - +rigidity, bradykinesia      Sensory - Intact to LT  Psychiatric - Mood WNL, Affect WNL    ASSESSMENT/PLAN  74y Male h/o HTN, CAD, with functional deficits due to Parkinson's, gait impairment, falls   on sinemet, entacapone, mirapex  mood/memory/sleep: aricept, melatonin at night   orthostatic hypotension/HTN on metoprolol, continue to monitor   BPH on flomax   routine TTE ordered by hospitalist, TSH   Continue current medical management  Pain - Tylenol PRN  DVT PPX - lovenox     Continue 3hrs a day of comprehensive rehab program.       35 minutes spent reviewing hospital course, relevant imaging, therapy notes, consultant notes, labs, imaging, examining patient, discussion with nurses/rehab team      rehab follow up note  CC: Parkinson's    no new complaints  slept well       REVIEW OF SYSTEMS  Constitutional - No fever,  No fatigue  Neurological - No headaches, No loss of strength  Musculoskeletal - No joint pain, No joint swelling, No muscle pain    VITALS  T(C): 36.9 (11-10-24 @ 07:38), Max: 36.9 (11-10-24 @ 07:38)  HR: 69 (11-10-24 @ 07:38) (69 - 69)  BP: 149/83 (11-10-24 @ 07:38) (149/83 - 149/83)  RR: 16 (11-10-24 @ 07:38) (16 - 16)  SpO2: 99% (11-10-24 @ 07:38) (98% - 99%)  Wt(kg): --       MEDICATIONS   acetaminophen     Tablet .. 650 milliGRAM(s) every 6 hours PRN  aspirin enteric coated 81 milliGRAM(s) at bedtime  carbidopa/levodopa  25/250 1 Tablet(s) <User Schedule>  carbidopa/levodopa CR 25/100 1 Tablet(s) <User Schedule>  donepezil 10 milliGRAM(s) at bedtime  enoxaparin Injectable 40 milliGRAM(s) every 24 hours  entacapone 200 milliGRAM(s) <User Schedule>  melatonin 3 milliGRAM(s) at bedtime PRN  metoprolol succinate  milliGRAM(s) daily  niacin  milliGRAM(s) at bedtime  polyethylene glycol 3350 17 Gram(s) daily  pramipexole 1 milliGRAM(s) three times a day  rosuvastatin 10 milliGRAM(s) at bedtime  senna 2 Tablet(s) at bedtime  tamsulosin 0.4 milliGRAM(s) at bedtime      RECENT LABS/IMAGING                        14.6   7.96  )-----------( 134      ( 09 Nov 2024 07:15 )             43.4     11-09    136  |  103  |  27[H]  ----------------------------<  107[H]  4.1   |  25  |  1.31[H]    Ca    8.8      09 Nov 2024 07:15    TPro  7.1  /  Alb  3.4  /  TBili  0.9  /  DBili  x   /  AST  15  /  ALT  8[L]  /  AlkPhos  77  11-09      Urinalysis Basic - ( 09 Nov 2024 07:15 )    Color: x / Appearance: x / SG: x / pH: x  Gluc: 107 mg/dL / Ketone: x  / Bili: x / Urobili: x   Blood: x / Protein: x / Nitrite: x   Leuk Esterase: x / RBC: x / WBC x   Sq Epi: x / Non Sq Epi: x / Bacteria: x                ---------  PHYSICAL EXAM  Constitutional - NAD, Comfortable, in chair   Pulm - Breathing comfortably on room air   Extremities -+ b/l LE edema, No calf tenderness  Neurologic Exam -                    Cognitive - Awake, Alert oriented x 3     Communication - Fluent     Motor - +rigidity, bradykinesia      Sensory - Intact to LT  Psychiatric - Mood WNL, Affect WNL    ASSESSMENT/PLAN  74y Male h/o HTN, CAD, with functional deficits due to Parkinson's, gait impairment, falls   on sinemet, entacapone, mirapex  mood/memory/sleep: aricept, melatonin at night   orthostatic hypotension/HTN on metoprolol, continue to monitor   BPH on flomax   routine TTE ordered by hospitalist, TSH   Leg blister, cover with nonadhesive dressing, lower TEDS today  Continue current medical management  Pain - Tylenol PRN  DVT PPX - lovenox     Continue 3hrs a day of comprehensive rehab program.       35 minutes spent reviewing hospital course, relevant imaging, therapy notes, consultant notes, labs, imaging, examining patient, discussion with nurses/rehab team

## 2024-11-10 NOTE — PROGRESS NOTE ADULT - SUBJECTIVE AND OBJECTIVE BOX
Patient seen and examined at bedside.       ALLERGIES:  No Known Allergies    MEDICATIONS  (STANDING):  aspirin enteric coated 81 milliGRAM(s) Oral at bedtime  carbidopa/levodopa  25/250 1 Tablet(s) Oral <User Schedule>  carbidopa/levodopa CR 25/100 1 Tablet(s) Oral <User Schedule>  donepezil 10 milliGRAM(s) Oral at bedtime  enoxaparin Injectable 40 milliGRAM(s) SubCutaneous every 24 hours  entacapone 200 milliGRAM(s) Oral <User Schedule>  metoprolol succinate  milliGRAM(s) Oral daily  niacin  milliGRAM(s) Oral at bedtime  polyethylene glycol 3350 17 Gram(s) Oral daily  pramipexole 1 milliGRAM(s) Oral three times a day  rosuvastatin 10 milliGRAM(s) Oral at bedtime  senna 2 Tablet(s) Oral at bedtime  tamsulosin 0.4 milliGRAM(s) Oral at bedtime    MEDICATIONS  (PRN):  acetaminophen     Tablet .. 650 milliGRAM(s) Oral every 6 hours PRN Mild Pain (1 - 3)  melatonin 3 milliGRAM(s) Oral at bedtime PRN Insomnia    Vital Signs Last 24 Hrs  T(F): 98.4 (10 Nov 2024 07:38), Max: 98.4 (10 Nov 2024 07:38)  HR: 69 (10 Nov 2024 07:38) (69 - 69)  BP: 149/83 (10 Nov 2024 07:38) (149/83 - 149/83)  RR: 16 (10 Nov 2024 07:38) (16 - 16)  SpO2: 99% (10 Nov 2024 07:38) (98% - 99%)  I&O's Summary    BMI (kg/m2): 30.7 (11-08-24 @ 16:26)  PHYSICAL EXAM:    Gen: nad, resting in bed  Neuro: aaox2  Heent: eomi b/l, no jvd, no oral exudates  Pulm: cta b/l, no w/r/r  CV: +s1s2, no m/r/g  Ab: soft, nt/nd, normoactive bs x 4  Extrem: trace edema, pulses intact and equal  Skin: no rashes  Psych: normal    LABS:                        14.6   7.96  )-----------( 134      ( 09 Nov 2024 07:15 )             43.4       11-09    136  |  103  |  27  ----------------------------<  107  4.1   |  25  |  1.31    Ca    8.8      09 Nov 2024 07:15  Phos  3.2     11-08  Mg     1.8     11-08    TPro  7.1  /  Alb  3.4  /  TBili  0.9  /  DBili  x   /  AST  15  /  ALT  8   /  AlkPhos  77  11-09       Urinalysis Basic - ( 09 Nov 2024 07:15 )    Color: x / Appearance: x / SG: x / pH: x  Gluc: 107 mg/dL / Ketone: x  / Bili: x / Urobili: x   Blood: x / Protein: x / Nitrite: x   Leuk Esterase: x / RBC: x / WBC x   Sq Epi: x / Non Sq Epi: x / Bacteria: x        COVID-19 PCR: NotDetec (11-08-24 @ 16:40)      RADIOLOGY & ADDITIONAL TESTS:    Care Discussed with Consultants/Other Providers:

## 2024-11-11 LAB
ALBUMIN SERPL ELPH-MCNC: 3.5 G/DL — SIGNIFICANT CHANGE UP (ref 3.3–5)
ALP SERPL-CCNC: 71 U/L — SIGNIFICANT CHANGE UP (ref 40–120)
ALT FLD-CCNC: 11 U/L — SIGNIFICANT CHANGE UP (ref 10–45)
ANION GAP SERPL CALC-SCNC: 6 MMOL/L — SIGNIFICANT CHANGE UP (ref 5–17)
AST SERPL-CCNC: 14 U/L — SIGNIFICANT CHANGE UP (ref 10–40)
BASOPHILS # BLD AUTO: 0.07 K/UL — SIGNIFICANT CHANGE UP (ref 0–0.2)
BASOPHILS NFR BLD AUTO: 0.8 % — SIGNIFICANT CHANGE UP (ref 0–2)
BILIRUB SERPL-MCNC: 1.1 MG/DL — SIGNIFICANT CHANGE UP (ref 0.2–1.2)
BUN SERPL-MCNC: 28 MG/DL — HIGH (ref 7–23)
CALCIUM SERPL-MCNC: 9.2 MG/DL — SIGNIFICANT CHANGE UP (ref 8.4–10.5)
CHLORIDE SERPL-SCNC: 101 MMOL/L — SIGNIFICANT CHANGE UP (ref 96–108)
CO2 SERPL-SCNC: 27 MMOL/L — SIGNIFICANT CHANGE UP (ref 22–31)
CREAT SERPL-MCNC: 1.21 MG/DL — SIGNIFICANT CHANGE UP (ref 0.5–1.3)
EGFR: 62 ML/MIN/1.73M2 — SIGNIFICANT CHANGE UP
EOSINOPHIL # BLD AUTO: 0.16 K/UL — SIGNIFICANT CHANGE UP (ref 0–0.5)
EOSINOPHIL NFR BLD AUTO: 1.9 % — SIGNIFICANT CHANGE UP (ref 0–6)
GLUCOSE SERPL-MCNC: 94 MG/DL — SIGNIFICANT CHANGE UP (ref 70–99)
HCT VFR BLD CALC: 41.4 % — SIGNIFICANT CHANGE UP (ref 39–50)
HGB BLD-MCNC: 14.2 G/DL — SIGNIFICANT CHANGE UP (ref 13–17)
IMM GRANULOCYTES NFR BLD AUTO: 0.5 % — SIGNIFICANT CHANGE UP (ref 0–0.9)
LYMPHOCYTES # BLD AUTO: 1.84 K/UL — SIGNIFICANT CHANGE UP (ref 1–3.3)
LYMPHOCYTES # BLD AUTO: 21.5 % — SIGNIFICANT CHANGE UP (ref 13–44)
MCHC RBC-ENTMCNC: 28.6 PG — SIGNIFICANT CHANGE UP (ref 27–34)
MCHC RBC-ENTMCNC: 34.3 G/DL — SIGNIFICANT CHANGE UP (ref 32–36)
MCV RBC AUTO: 83.5 FL — SIGNIFICANT CHANGE UP (ref 80–100)
MONOCYTES # BLD AUTO: 0.75 K/UL — SIGNIFICANT CHANGE UP (ref 0–0.9)
MONOCYTES NFR BLD AUTO: 8.8 % — SIGNIFICANT CHANGE UP (ref 2–14)
NEUTROPHILS # BLD AUTO: 5.71 K/UL — SIGNIFICANT CHANGE UP (ref 1.8–7.4)
NEUTROPHILS NFR BLD AUTO: 66.5 % — SIGNIFICANT CHANGE UP (ref 43–77)
NRBC # BLD: 0 /100 WBCS — SIGNIFICANT CHANGE UP (ref 0–0)
PLATELET # BLD AUTO: 143 K/UL — LOW (ref 150–400)
POTASSIUM SERPL-MCNC: 4.8 MMOL/L — SIGNIFICANT CHANGE UP (ref 3.5–5.3)
POTASSIUM SERPL-SCNC: 4.8 MMOL/L — SIGNIFICANT CHANGE UP (ref 3.5–5.3)
PROT SERPL-MCNC: 7.1 G/DL — SIGNIFICANT CHANGE UP (ref 6–8.3)
RBC # BLD: 4.96 M/UL — SIGNIFICANT CHANGE UP (ref 4.2–5.8)
RBC # FLD: 15.3 % — HIGH (ref 10.3–14.5)
SODIUM SERPL-SCNC: 134 MMOL/L — LOW (ref 135–145)
TSH SERPL-MCNC: 4.05 UIU/ML — HIGH (ref 0.36–3.74)
WBC # BLD: 8.57 K/UL — SIGNIFICANT CHANGE UP (ref 3.8–10.5)
WBC # FLD AUTO: 8.57 K/UL — SIGNIFICANT CHANGE UP (ref 3.8–10.5)

## 2024-11-11 PROCEDURE — 99232 SBSQ HOSP IP/OBS MODERATE 35: CPT

## 2024-11-11 PROCEDURE — 90832 PSYTX W PT 30 MINUTES: CPT

## 2024-11-11 RX ORDER — PRAMIPEXOLE 1.5 MG/1
0.5 TABLET, EXTENDED RELEASE ORAL THREE TIMES A DAY
Refills: 0 | Status: DISCONTINUED | OUTPATIENT
Start: 2024-11-11 | End: 2024-11-11

## 2024-11-11 RX ORDER — PRAMIPEXOLE 1.5 MG/1
0.25 TABLET, EXTENDED RELEASE ORAL THREE TIMES A DAY
Refills: 0 | Status: DISCONTINUED | OUTPATIENT
Start: 2024-11-11 | End: 2024-11-11

## 2024-11-11 RX ORDER — PRAMIPEXOLE 1.5 MG/1
0.75 TABLET, EXTENDED RELEASE ORAL THREE TIMES A DAY
Refills: 0 | Status: DISCONTINUED | OUTPATIENT
Start: 2024-11-11 | End: 2024-11-14

## 2024-11-11 RX ORDER — PRAMIPEXOLE 1.5 MG/1
0.75 TABLET, EXTENDED RELEASE ORAL ONCE
Refills: 0 | Status: COMPLETED | OUTPATIENT
Start: 2024-11-11 | End: 2024-11-11

## 2024-11-11 RX ADMIN — ENTACAPONE 200 MILLIGRAM(S): 200 TABLET, FILM COATED ORAL at 06:46

## 2024-11-11 RX ADMIN — Medication 81 MILLIGRAM(S): at 23:32

## 2024-11-11 RX ADMIN — CARBIDOPA AND LEVODOPA 1 TABLET(S): 10; 100 TABLET ORAL at 14:59

## 2024-11-11 RX ADMIN — PRAMIPEXOLE 0.75 MILLIGRAM(S): 1.5 TABLET, EXTENDED RELEASE ORAL at 23:31

## 2024-11-11 RX ADMIN — ENOXAPARIN SODIUM 40 MILLIGRAM(S): 30 INJECTION SUBCUTANEOUS at 06:47

## 2024-11-11 RX ADMIN — Medication 500 MILLIGRAM(S): at 23:32

## 2024-11-11 RX ADMIN — Medication 2 TABLET(S): at 23:32

## 2024-11-11 RX ADMIN — METOPROLOL TARTRATE 100 MILLIGRAM(S): 100 TABLET, FILM COATED ORAL at 06:46

## 2024-11-11 RX ADMIN — PRAMIPEXOLE 1 MILLIGRAM(S): 1.5 TABLET, EXTENDED RELEASE ORAL at 06:46

## 2024-11-11 RX ADMIN — CARBIDOPA AND LEVODOPA 1 TABLET(S): 10; 100 TABLET ORAL at 11:14

## 2024-11-11 RX ADMIN — ENTACAPONE 200 MILLIGRAM(S): 200 TABLET, FILM COATED ORAL at 18:45

## 2024-11-11 RX ADMIN — ROSUVASTATIN CALCIUM 10 MILLIGRAM(S): 5 TABLET, FILM COATED ORAL at 23:31

## 2024-11-11 RX ADMIN — DONEPEZIL HYDROCHLORIDE 10 MILLIGRAM(S): 5 TABLET, FILM COATED ORAL at 23:32

## 2024-11-11 RX ADMIN — CARBIDOPA AND LEVODOPA 1 TABLET(S): 10; 100 TABLET ORAL at 18:45

## 2024-11-11 RX ADMIN — TAMSULOSIN HYDROCHLORIDE 0.4 MILLIGRAM(S): 0.4 CAPSULE ORAL at 23:32

## 2024-11-11 RX ADMIN — PRAMIPEXOLE 0.75 MILLIGRAM(S): 1.5 TABLET, EXTENDED RELEASE ORAL at 16:06

## 2024-11-11 RX ADMIN — POLYETHYLENE GLYCOL 3350 17 GRAM(S): 17 POWDER, FOR SOLUTION ORAL at 11:14

## 2024-11-11 RX ADMIN — ENTACAPONE 200 MILLIGRAM(S): 200 TABLET, FILM COATED ORAL at 14:58

## 2024-11-11 RX ADMIN — ENTACAPONE 200 MILLIGRAM(S): 200 TABLET, FILM COATED ORAL at 11:14

## 2024-11-11 RX ADMIN — CARBIDOPA AND LEVODOPA 1 TABLET(S): 10; 100 TABLET ORAL at 23:53

## 2024-11-11 RX ADMIN — CARBIDOPA AND LEVODOPA 1 TABLET(S): 10; 100 TABLET ORAL at 06:47

## 2024-11-11 NOTE — PROGRESS NOTE ADULT - ATTENDING COMMENTS
I independently performed the documented the history, exam, and medical decision making. I have made amendments to the documentation where necessary. I have personally seen and examined the patient. Medical records were reviewed and I have made amendments to the documentation where necessary and adjusted the history, physical examination, and plan as documented by the Fellow Physician.

## 2024-11-11 NOTE — PROGRESS NOTE ADULT - ASSESSMENT
74 year old male with PMH of HTN, HLD, CAD s/p PCI/CABG x 2, BPH, Parkinson's Disease who presented to [facility] on 11/6 from home for frequent falls and hallucinations. Falls have been increasing in frequency, now occurring 2-3x a week attributed to gait instability due to stiffness and feeling "frozen". The falls have led him to become more immobile leading to worsening LE swelling. Patient also endorses increase hallucination episodes (e.g. snakes in the house). Medical workup significant for TOYA, negative for infection and acute intracranial pathology. S/P lasix for b/l LE edema. He was subsequently referred to [facility] for acute inpatient rehab On 11/8/24.   #Parkinson's Disease  #Rigidity/Bradykinesia/falls  -Comprehensive Rehab Program  -Continue Sinemet 25/250 mg 1 tab at 7am, 11am, 3pm, and 7pm with dose of entacapone 200mg at same timing  -Continue Sinemet 25/100mg CR 1 tab at 12am   -Mirapex 1mg TID  -aricept 10mg daily   -melatonin PRN   -further medication changes per movement disorder specialist.   -fall, aspiration precautions      #Orthostatic hypotension  -Currently on metoprolol and flomax - monitor for continued need  -Monitor OH vital signs  #HTN  #CAD - s/p remote CABG and PCI  # HLD  -Continue metoprolol 100mg ER   -Niacin 500mg daily   -Crestor 10mg daily   -Continue aspirin 81mg daily   -monitor vitals    #BL lower extremity edema  - B/L lower extremity venous doppler (11/04/2024): negative for DVT  - s/p lasix 40mg po x 3 days as inpatient - subsequent TOYA, would avoid further use for now and use conservative management  - elevate legs daily  - TTE: EF 50-55%. cacified AS, AR. Bi Atrial enlargement  - monitor    # LLE blister/wound  - blister has been broken. now oozing edema fluid. no sign of infection. does not look like pressure injury  - wound care per primary  - offload pressure  - avoid rubbing  - elevate legs      #Constipation  - Continue Senna at bedtime   - Miralax daily    #BPH  -monitor PVR q 8 hours (SC if > 400)  -Monitor UO  -Continue flomax    #DVT Prophylaxis  - Lovenox    d/w rehab team at [facility] 74 year old male with PMH of HTN, HLD, CAD s/p PCI/CABG x 2, BPH, Parkinson's Disease who presented to [facility] on 11/6 from home for frequent falls and hallucinations. Falls have been increasing in frequency, now occurring 2-3x a week attributed to gait instability due to stiffness and feeling "frozen". The falls have led him to become more immobile leading to worsening LE swelling. Patient also endorses increase hallucination episodes (e.g. snakes in the house). Medical workup significant for TOYA, negative for infection and acute intracranial pathology. S/P lasix for b/l LE edema. He was subsequently referred to [facility] for acute inpatient rehab On 11/8/24.   #Parkinson's Disease  #Rigidity/Bradykinesia/falls  -Comprehensive Rehab Program  -Continue Sinemet 25/250 mg 1 tab at 7am, 11am, 3pm, and 7pm with dose of entacapone 200mg at same timing  -Continue Sinemet 25/100mg CR 1 tab at 12am   -Mirapex 1mg TID  -aricept 10mg daily   -melatonin PRN   -further medication changes per movement disorder specialist.   -fall, aspiration precautions      #Orthostatic hypotension  -Currently on metoprolol and flomax - monitor for continued need  -Monitor OH vital signs  #HTN  #CAD - s/p remote CABG and PCI  # HLD  -Continue metoprolol 100mg ER   -Niacin 500mg daily   -Crestor 10mg daily   -Continue aspirin 81mg daily   -monitor vitals    #BL lower extremity edema  - B/L lower extremity venous doppler (11/04/2024): negative for DVT  - s/p lasix 40mg po x 3 days as inpatient - subsequent TOYA, would avoid further use for now and use conservative management  - elevate legs daily  - TTE: EF 50-55%. cacified AS, AR. Bi Atrial enlargement  - monitor    # LLE blister/wound  - blister has been broken. now oozing edema fluid. no sign of infection. does not look like pressure injury  - wound care per primary  - offload pressure  - avoid rubbing  - elevate legs    # Abnormal TSH  - TSh mildly elevated  - no symptoms  - repeat TFT in 4-6 weeks    # mild hyponatremia  - sodium 134  - monitor      #Constipation  - Continue Senna at bedtime   - Miralax daily    #BPH  -monitor PVR q 8 hours (SC if > 400)  -Monitor UO  -Continue flomax    #DVT Prophylaxis  - Lovenox    d/w rehab team at [facility]

## 2024-11-11 NOTE — PROGRESS NOTE ADULT - NUTRITIONAL ASSESSMENT
Pt alert, fair attention, relatively intact expressive language, thought processes - goal-directed, thought contents - no morbid contents noticed, depressed affect, euthymic mood, denied AH/VH, denied SI/HI/I/P, calm behavior. Plan: Continue the assessment process.

## 2024-11-11 NOTE — PROGRESS NOTE ADULT - SUBJECTIVE AND OBJECTIVE BOX
SUBJECTIVE/ROS: patient seen at bedside with his wife by his side. No new complaints. Slept well. Denies chest pain, fever, chills, nausea, vomiting, abdominal pain, headache, or BLE pain. Labs reviewed, notable for mildly elevates TSH. No significant orthostasis. Discussed with patient and wife that in consideration of daily hallucinations and initial cognitive issues it is appropriate at this time to taper down pramipexole to potentially discontinue it. They agreed with the plan.    HPI:  74 year old male with PMH of HTN, HLD, CAD s/p PCI/CABG x 2 (last 8 years ago), BPH, Parkinson's Disease (dx 8 years ago, follows with Dr. Claudio) who presented to [facility] on 11/6 from home for frequent falls and hallucinations. He has history of occasional falls with increased frequency over the last 1-2 weeks (2-3 times this week) attributed to gait instability due to stiffness and feeling "frozen". Due to these falls, he has been more immobile and over last weekend, family noted increased LE edema for which he takes PRN lasix. Patient also endorses increase hallucination episodes (e.g. snakes in the house, having boots on when he didn't). In the ED, he was afebrile and hemodynamically stable. CTH is negative for acute intracranial pathology. LE duplex negative for DVT. He was evaluated by PT, OT, and PMR and  recommended for inpatient acute rehab. Neurology consulted for medication optimization for Parkinson's disease. Pt given lasix for b/l LE edema. He was admitted to [facility] On 11/8/24.     PHYSICAL EXAM    Vital Signs Last 24 Hrs  T(C): 36.6 (11 Nov 2024 07:17), Max: 36.8 (10 Nov 2024 20:05)  T(F): 97.8 (11 Nov 2024 07:17), Max: 98.3 (10 Nov 2024 20:05)  HR: 60 (11 Nov 2024 07:17) (60 - 61)  BP: 137/72 (11 Nov 2024 07:17) (137/72 - 145/78)  RR: 16 (11 Nov 2024 07:17) (16 - 18)  SpO2: 97% (11 Nov 2024 07:17) (97% - 100%)    Parameters below as of 11 Nov 2024 07:17  Patient On (Oxygen Delivery Method): room air      PHYSICAL EXAM  Constitutional - NAD, Comfortable  HEENT - NCAT, EOMI  Neck - Supple, No limited ROM  Chest - Breathing comfortably in room air   Extremities - Mild bilateral LE edema, no calf tenderness   Neurologic Exam - sitting comfortably in the chair, no dyskinesias at time of visit, no tremor. Mild hypophonia. Mild rigidity. Moderate bradykinesia worse on the R.     RECENT LABS:                        14.2   8.57  )-----------( 143      ( 11 Nov 2024 07:00 )             41.4     11-11    134[L]  |  101  |  28[H]  ----------------------------<  94  4.8   |  27  |  1.21    Ca    9.2      11 Nov 2024 07:00    TPro  7.1  /  Alb  3.5  /  TBili  1.1  /  DBili  x   /  AST  14  /  ALT  11  /  AlkPhos  71  11-11    LIVER FUNCTIONS - ( 11 Nov 2024 07:00 )  Alb: 3.5 g/dL / Pro: 7.1 g/dL / ALK PHOS: 71 U/L / ALT: 11 U/L / AST: 14 U/L / GGT: x             MEDICATIONS  (STANDING):  aspirin enteric coated 81 milliGRAM(s) Oral at bedtime  carbidopa/levodopa  25/250 1 Tablet(s) Oral <User Schedule>  carbidopa/levodopa CR 25/100 1 Tablet(s) Oral <User Schedule>  donepezil 10 milliGRAM(s) Oral at bedtime  enoxaparin Injectable 40 milliGRAM(s) SubCutaneous every 24 hours  entacapone 200 milliGRAM(s) Oral <User Schedule>  metoprolol succinate  milliGRAM(s) Oral daily  niacin  milliGRAM(s) Oral at bedtime  polyethylene glycol 3350 17 Gram(s) Oral daily  pramipexole 1 milliGRAM(s) Oral three times a day  rosuvastatin 10 milliGRAM(s) Oral at bedtime  senna 2 Tablet(s) Oral at bedtime  tamsulosin 0.4 milliGRAM(s) Oral at bedtime    MEDICATIONS  (PRN):  acetaminophen     Tablet .. 650 milliGRAM(s) Oral every 6 hours PRN Mild Pain (1 - 3)  melatonin 3 milliGRAM(s) Oral at bedtime PRN Insomnia

## 2024-11-11 NOTE — PROGRESS NOTE ADULT - SUBJECTIVE AND OBJECTIVE BOX
Pt was seen for 30 minutes for supportive tx, his wife was present. Pt c/o depressed mood. Reviewed chart, approached Pt for an initial consult, introduced the role of neuropsychology in the rehab team, and explained the nature and purpose of the consult. Pt is a 73 y/o male with PMHx of HTN, HLD, CAD s/p PCI/CABG x 2 (last 8 years ago), BPH, Parkinson's Disease (dx 8 years ago, follows with Dr. Claudio) who presented to [facility] on 11/6 from home for frequent falls and hallucinations. He has history of occasional falls with increased frequency over the last 1-2 weeks (2-3 times this week) attributed to gait instability due to stiffness and feeling "frozen". Due to these falls, he has been more immobile and over last weekend, family noted increased LE edema for which he takes PRN lasix. Patient also endorses increase hallucination episodes (e.g. snakes in the house, having boots on when he didn't). In the ED, he was afebrile and hemodynamically stable. CTH is negative for acute intracranial pathology. LE duplex negative for DVT. He was evaluated by PT, OT, and PMR and  recommended for inpatient acute rehab. Neurology consulted for medication optimization for Parkinson's disease. Pt agreed to participate, he was well-related and candid while discussing his current medical condition. Pt appeared somewhat tired and irritable, but trying to manage by means of humor. Session focused on establishing rapport with Pt, gathering relevant biographical information, and assessing his current emotional functioning. Pt provided, with his wife's occasional input,  sufficient information about his medical hx and social hx. Additionally, Pt answered all questions during the clinical interview in order to elicit emotional symptoms. Pt reported experiencing  depressed mood, poor sleep and fatigue. Pt also reported experiencing VH 1-2 times per week. Support and encouragement were provided.

## 2024-11-11 NOTE — PROGRESS NOTE ADULT - ASSESSMENT
74 year old male with PMH of HTN, HLD, CAD s/p PCI/CABG x 2 (last 8 years ago), BPH, Parkinson's Disease (dx 8 years ago, follows with Dr. Claudio) who presented to [facility] on 11/6 from home for frequent falls and hallucinations. He has history of occasional falls with increased frequency over the last 1-2 weeks (2-3 times this week) attributed to gait instability due to stiffness and feeling "frozen". Due to these falls, he has been more immobile and over last weekend, family noted increased LE edema for which he takes PRN lasix. Patient also endorses increase hallucination episodes (e.g. snakes in the house, having boots on when he didn't). In the ED, he was afebrile and hemodynamically stable. CTH is negative for acute intracranial pathology. LE duplex negative for DVT. He was evaluated by PT, OT, and PMR and  recommended for inpatient acute rehab. Neurology consulted for medication optimization for Parkinson's disease. Pt given lasix for b/l LE edema. He was admitted to [facility] On 11/8/24.     #Parkinson's Disease now with gait Instability, ADL impairments and Functional impairments  - Start Comprehensive Rehab Program of PT/OT/SLP    ------------------------------------------------------  Parkinson's related motor symptoms    #Rigidity/Bradykinesia/falls   -Continue Sinemet 25/250 mg 1 tab at 7am, 11am, 3pm, and 7pm with dose of entacapone 200 mg at same timing - to monitor symptoms while titrating down pramipexole, potentially to consider more frequent lower dosing and/or opicapone   -Continue Sinemet 25/100mg CR 1 tab at 12am   -Mirapex 1mg TID - reduce to 0.75 mg TID, if tolerated will go down to 0.50 mg TID in 2-3 days, then 0.25 mg in 2-3 days, then 0.125 mg TID to BID to daily, then stop    -Follows with Dr. Veras  -Movement disorders following    --------------------------------------------------------  Parkinson's related non-motor symptoms    #Mood/memory/sleep  -Continue aricept 10mg daily   -Continue melatonin PRN   -Neuropsych to see   -Rec therapy     #Orthostatic hypotension  -Monitor OH vital signs  -Currently on metoprolol and flomax - monitor for continued need - no significant orthostasis for now     #Pain control  - Tylenol PRN    #GI/Bowel Management/Constipation  - Continue Senna at bedtime   - Miralax daily    #Bladder management/BPH  - Continue to monitor PVR q 8 hours (SC if > 400)  - Monitor UO  - Continue flomax - monitor OH    ----------------------------------------------------------  Concurrent medical problems    #Mildly elevated TSH   - T3, T4 added      #HTN  #CAD - s/p CABG and PCI in the past  -Continue metoprolol 100mg ER - monitor vitals  -EKG  -Niacin 500mg daily   -Crestor 10mg daily   -Continue aspirin 81mg daily     #bilateral lower extremity edema -with blister  - B/L lower extremity venous doppler (11/04/2024): negative for DVT  - s/p lasix 40mg po x 3 days as inpatient- monitor for need   - elevate legs daily  - ACE wrap for compression if needed  - Echo TTE (11/10):  moderate calcific aortic stenosis with mild aortic insufficiency, biatrial enlargement, left ventricular ejection fraction, by visual estimation, 55 to 60%, normal global left ventricular systolic function, moderate mitral annular calcification, trace mitral valve regurgitation, dilatation of the aortic root, Dimesionless Index value is .49.    #DVT Prophylaxis  - Lovenox  - TEDs   - LE duplex 11/4 - negative for DVT    #Skin:  -Left leg blister - elevate leg and wrap with dry gauze if oozing - not currently oozing   -Offset pressure.     FEN   - Diet - DASH diet  - Dysphagia  SLP - evaluation and treatment    Precautions / PROPHYLAXIS:   - Falls, Cardiac  - ortho: Weight bearing status: WBAT   - Lungs: Aspiration  - Pressure injury/Skin:  OOB to Chair, PT/OT      Danilo Javier  Pulmonary Disease  [address] [place] [address]  Phone: [phone]  Fax: [phone]  Follow Up Time: 2 weeks

## 2024-11-11 NOTE — PROGRESS NOTE ADULT - SUBJECTIVE AND OBJECTIVE BOX
Medicine Progress Note    Patient is a 74y old  Male who presents with a chief complaint of Parkinson's (10 Nov 2024 10:13)      SUBJECTIVE / OVERNIGHT EVENTS:  seen and examined  Chart reviewed  No overnight events  Limb weakness improving with therapy  BM+  No pain  reported LLE blister and wound after blister was popped open. no other symptoms      ROS:  denied fever/chills/CP/SOB/cough/palpitation/dizziness/abdominal pian/nausea/vomiting/diarrhoea/constipation/dysuria/leg or calf pain/headaches.all other ROS neg    MEDICATIONS  (STANDING):  aspirin enteric coated 81 milliGRAM(s) Oral at bedtime  carbidopa/levodopa  25/250 1 Tablet(s) Oral <User Schedule>  carbidopa/levodopa CR 25/100 1 Tablet(s) Oral <User Schedule>  donepezil 10 milliGRAM(s) Oral at bedtime  enoxaparin Injectable 40 milliGRAM(s) SubCutaneous every 24 hours  entacapone 200 milliGRAM(s) Oral <User Schedule>  metoprolol succinate  milliGRAM(s) Oral daily  niacin  milliGRAM(s) Oral at bedtime  polyethylene glycol 3350 17 Gram(s) Oral daily  pramipexole 1 milliGRAM(s) Oral three times a day  rosuvastatin 10 milliGRAM(s) Oral at bedtime  senna 2 Tablet(s) Oral at bedtime  tamsulosin 0.4 milliGRAM(s) Oral at bedtime    MEDICATIONS  (PRN):  acetaminophen     Tablet .. 650 milliGRAM(s) Oral every 6 hours PRN Mild Pain (1 - 3)  melatonin 3 milliGRAM(s) Oral at bedtime PRN Insomnia    CAPILLARY BLOOD GLUCOSE        I&O's Summary      PHYSICAL EXAM:  Vital Signs Last 24 Hrs  T(C): 36.6 (11 Nov 2024 07:17), Max: 36.8 (10 Nov 2024 20:05)  T(F): 97.8 (11 Nov 2024 07:17), Max: 98.3 (10 Nov 2024 20:05)  HR: 60 (11 Nov 2024 07:17) (60 - 61)  BP: 137/72 (11 Nov 2024 07:17) (137/72 - 145/78)  BP(mean): --  RR: 16 (11 Nov 2024 07:17) (16 - 18)  SpO2: 97% (11 Nov 2024 07:17) (97% - 100%)    Parameters below as of 11 Nov 2024 07:17  Patient On (Oxygen Delivery Method): room air      GENERAL: Not in distress. Alert    HEENT: clear conjuctiva, MMM. no pallor or icterus  CARDIOVASCULAR: RRR S1, S2. No murmur/rubs/gallop  LUNGS: BLAE+, no rales, no wheezing, no rhonchi.    ABDOMEN: ND. Soft,  NT, no guarding / rebound / rigidity. BS normoactive  BACK: No spine tenderness.  EXTREMITIES: no edema. no leg or calf TP. LLE open blister with some oozing od edema fluid. does not look infected.   SKIN: warm and dry  PSYCHIATRIC: Calm.  No agitation.  CNS: AAO. moves limbs, follows commands. rigidity, bradykinesia, hypophonia    LABS:                        14.2   8.57  )-----------( 143      ( 11 Nov 2024 07:00 )             41.4     11-11    134[L]  |  101  |  28[H]  ----------------------------<  94  4.8   |  27  |  1.21    Ca    9.2      11 Nov 2024 07:00    TPro  7.1  /  Alb  3.5  /  TBili  1.1  /  DBili  x   /  AST  14  /  ALT  11  /  AlkPhos  71  11-11          Urinalysis Basic - ( 11 Nov 2024 07:00 )    Color: x / Appearance: x / SG: x / pH: x  Gluc: 94 mg/dL / Ketone: x  / Bili: x / Urobili: x   Blood: x / Protein: x / Nitrite: x   Leuk Esterase: x / RBC: x / WBC x   Sq Epi: x / Non Sq Epi: x / Bacteria: x        COVID-19 PCR: NotDetec (08 Nov 2024 16:40)      RADIOLOGY & ADDITIONAL TESTS:  Imaging from Last 24 Hours:    Electrocardiogram/QTc Interval:    COORDINATION OF CARE:  Care Discussed with Consultants/Other Providers:

## 2024-11-12 PROCEDURE — 99232 SBSQ HOSP IP/OBS MODERATE 35: CPT

## 2024-11-12 RX ADMIN — DONEPEZIL HYDROCHLORIDE 10 MILLIGRAM(S): 5 TABLET, FILM COATED ORAL at 22:27

## 2024-11-12 RX ADMIN — POLYETHYLENE GLYCOL 3350 17 GRAM(S): 17 POWDER, FOR SOLUTION ORAL at 11:27

## 2024-11-12 RX ADMIN — TAMSULOSIN HYDROCHLORIDE 0.4 MILLIGRAM(S): 0.4 CAPSULE ORAL at 22:28

## 2024-11-12 RX ADMIN — PRAMIPEXOLE 0.75 MILLIGRAM(S): 1.5 TABLET, EXTENDED RELEASE ORAL at 08:02

## 2024-11-12 RX ADMIN — PRAMIPEXOLE 0.75 MILLIGRAM(S): 1.5 TABLET, EXTENDED RELEASE ORAL at 14:10

## 2024-11-12 RX ADMIN — PRAMIPEXOLE 0.75 MILLIGRAM(S): 1.5 TABLET, EXTENDED RELEASE ORAL at 22:27

## 2024-11-12 RX ADMIN — ENTACAPONE 200 MILLIGRAM(S): 200 TABLET, FILM COATED ORAL at 18:42

## 2024-11-12 RX ADMIN — CARBIDOPA AND LEVODOPA 1 TABLET(S): 10; 100 TABLET ORAL at 07:13

## 2024-11-12 RX ADMIN — ENTACAPONE 200 MILLIGRAM(S): 200 TABLET, FILM COATED ORAL at 07:13

## 2024-11-12 RX ADMIN — Medication 2 TABLET(S): at 22:27

## 2024-11-12 RX ADMIN — ENTACAPONE 200 MILLIGRAM(S): 200 TABLET, FILM COATED ORAL at 11:27

## 2024-11-12 RX ADMIN — CARBIDOPA AND LEVODOPA 1 TABLET(S): 10; 100 TABLET ORAL at 11:26

## 2024-11-12 RX ADMIN — ENTACAPONE 200 MILLIGRAM(S): 200 TABLET, FILM COATED ORAL at 15:11

## 2024-11-12 RX ADMIN — CARBIDOPA AND LEVODOPA 1 TABLET(S): 10; 100 TABLET ORAL at 18:42

## 2024-11-12 RX ADMIN — ROSUVASTATIN CALCIUM 10 MILLIGRAM(S): 5 TABLET, FILM COATED ORAL at 22:27

## 2024-11-12 RX ADMIN — Medication 500 MILLIGRAM(S): at 22:28

## 2024-11-12 RX ADMIN — Medication 81 MILLIGRAM(S): at 22:27

## 2024-11-12 RX ADMIN — ENOXAPARIN SODIUM 40 MILLIGRAM(S): 30 INJECTION SUBCUTANEOUS at 07:12

## 2024-11-12 RX ADMIN — METOPROLOL TARTRATE 100 MILLIGRAM(S): 100 TABLET, FILM COATED ORAL at 07:14

## 2024-11-12 RX ADMIN — CARBIDOPA AND LEVODOPA 1 TABLET(S): 10; 100 TABLET ORAL at 15:11

## 2024-11-12 NOTE — PROGRESS NOTE ADULT - ASSESSMENT
74 year old male with PMH of HTN, HLD, CAD s/p PCI/CABG x 2 (last 8 years ago), BPH, Parkinson's Disease (dx 8 years ago, follows with Dr. Claudio) who presented to [facility] on 11/6 from home for frequent falls and hallucinations. He has history of occasional falls with increased frequency over the last 1-2 weeks (2-3 times this week) attributed to gait instability due to stiffness and feeling "frozen". Due to these falls, he has been more immobile and over last weekend, family noted increased LE edema for which he takes PRN lasix. Patient also endorses increase hallucination episodes (e.g. snakes in the house, having boots on when he didn't). In the ED, he was afebrile and hemodynamically stable. CTH is negative for acute intracranial pathology. LE duplex negative for DVT. He was evaluated by PT, OT, and PMR and  recommended for inpatient acute rehab. Neurology consulted for medication optimization for Parkinson's disease. Pt given lasix for b/l LE edema. He was admitted to [facility] On 11/8/24.     #Parkinson's Disease now with gait Instability, ADL impairments and Functional impairments  - Start Comprehensive Rehab Program of PT/OT/SLP    ------------------------------------------------------  Parkinson's related motor symptoms    #Rigidity/Bradykinesia/falls   -Continue Sinemet 25/250 mg 1 tab at 7am, 11am, 3pm, and 7pm with dose of entacapone 200 mg at same timing - to monitor symptoms while titrating down pramipexole, potentially to consider more frequent lower dosing and/or opicapone   -Continue Sinemet 25/100mg CR 1 tab at 12am   -Mirapex reduce 0.75 mg TID, if tolerated will go down to 0.50 mg TID in 2-3 days, then 0.25 mg in 2-3 days, then 0.125 mg TID to BID to daily, then stop    -Follows with Dr. Veras  -Movement disorders following    --------------------------------------------------------  Parkinson's related non-motor symptoms    #Mood/memory/sleep  -Continue aricept 10mg daily   -Continue melatonin PRN   -Neuropsych to see   -Rec therapy     #Orthostatic hypotension  -Monitor OH vital signs  -Currently on metoprolol and flomax - monitor for continued need   - no significant orthostasis for now     #Pain control  - Tylenol PRN    #GI/Bowel Management/Constipation  - Continue Senna at bedtime   - Miralax daily    #Bladder management/BPH  - Continue to monitor PVR q 8 hours (SC if > 400)  - Monitor UO  - Continue flomax - monitor OH    ----------------------------------------------------------  Concurrent medical problems    #Mildly elevated TSH   - T3, T4 added -pending  -TSH 4.05    #HTN  #CAD - s/p CABG and PCI in the past  -Continue metoprolol 100mg ER - monitor vitals  -EKG  -Niacin 500mg daily   -Crestor 10mg daily   -Continue aspirin 81mg daily     #bilateral lower extremity edema -with blister  - B/L lower extremity venous doppler (11/04/2024): negative for DVT  - s/p lasix 40mg po x 3 days as inpatient- monitor for need   - elevate legs daily  - ACE wrap for compression if needed  - Echo TTE (11/10):  moderate calcific aortic stenosis with mild aortic insufficiency, biatrial enlargement, left ventricular ejection fraction, by visual estimation, 55 to 60%, normal global left ventricular systolic function, moderate mitral annular calcification, trace mitral valve regurgitation, dilatation of the aortic root, Dimesionless Index value is .49.    #DVT Prophylaxis  - Lovenox  - TEDs   - LE duplex 11/4 - negative for DVT    #Skin:  -Left leg blister - elevate leg and apply foam dressing if oozing noted  -Offset pressure.     FEN   - Diet - DASH diet  - Dysphagia  SLP - evaluation and treatment    Precautions / PROPHYLAXIS:   - Falls, Cardiac  - ortho: Weight bearing status: WBAT   - Lungs: Aspiration  - Pressure injury/Skin:  OOB to Chair, PT/OT      Danilo Javier  Pulmonary Disease  [address] [place] [id number]  Phone: [phone]  Fax: [phone]  Follow Up Time: 2 weeks   74 year old male with PMH of HTN, HLD, CAD s/p PCI/CABG x 2 (last 8 years ago), BPH, Parkinson's Disease (dx 8 years ago, follows with Dr. Claudio) who presented to [facility] on 11/6 from home for frequent falls and hallucinations. He has history of occasional falls with increased frequency over the last 1-2 weeks (2-3 times this week) attributed to gait instability due to stiffness and feeling "frozen". Due to these falls, he has been more immobile and over last weekend, family noted increased LE edema for which he takes PRN lasix. Patient also endorses increase hallucination episodes (e.g. snakes in the house, having boots on when he didn't). In the ED, he was afebrile and hemodynamically stable. CTH is negative for acute intracranial pathology. LE duplex negative for DVT. He was evaluated by PT, OT, and PMR and  recommended for inpatient acute rehab. Neurology consulted for medication optimization for Parkinson's disease. Pt given lasix for b/l LE edema. He was admitted to [facility] On 11/8/24.     #Parkinson's Disease now with gait Instability, ADL impairments and Functional impairments  - Continue Comprehensive Rehab Program of PT/OT/SLP    ------------------------------------------------------  Parkinson's related motor symptoms    #Rigidity/Bradykinesia/falls   -Continue Sinemet 25/250 mg 1 tab at 7am, 11am, 3pm, and 7pm with dose of entacapone 200 mg at same timing - to monitor symptoms while titrating down pramipexole, potentially to consider more frequent lower dosing and/or opicapone   -Continue Sinemet 25/100mg CR 1 tab at 12am   -Mirapex reduce 0.75 mg TID, if tolerated will go down to 0.50 mg TID in 2-3 days, then 0.25 mg in 2-3 days, then 0.125 mg TID to BID to daily, then stop    -Follows with Dr. Veras  -Movement disorders following    --------------------------------------------------------  Parkinson's related non-motor symptoms    #Mood/memory/sleep  -Continue aricept 10mg daily   -Continue melatonin PRN   -Neuropsych to see   -Rec therapy     #Orthostatic hypotension  -Monitor OH vital signs  -Currently on metoprolol and flomax - monitor for continued need   - no significant orthostasis for now     #Pain control  - Tylenol PRN    #GI/Bowel Management/Constipation  - Continue Senna at bedtime   - Miralax daily    #Bladder management/BPH  - Continue to monitor PVR q 8 hours (SC if > 400)  - Monitor UO  - Continue flomax - monitor OH    ----------------------------------------------------------  Concurrent medical problems    #Mildly elevated TSH   - T3, T4 added -pending  -TSH 4.05    #HTN  #CAD - s/p CABG and PCI in the past  -Continue metoprolol 100mg ER - monitor vitals  -EKG  -Niacin 500mg daily   -Crestor 10mg daily   -Continue aspirin 81mg daily     #bilateral lower extremity edema -with blister  - B/L lower extremity venous doppler (11/04/2024): negative for DVT  - s/p lasix 40mg po x 3 days as inpatient- monitor for need   - elevate legs daily  - ACE wrap for compression if needed  - Echo TTE (11/10):  moderate calcific aortic stenosis with mild aortic insufficiency, biatrial enlargement, left ventricular ejection fraction, by visual estimation, 55 to 60%, normal global left ventricular systolic function, moderate mitral annular calcification, trace mitral valve regurgitation, dilatation of the aortic root, Dimesionless Index value is .49.    #DVT Prophylaxis  - Lovenox  - TEDs   - LE duplex 11/4 - negative for DVT    #Skin:  -Left leg blister - elevate leg and apply foam dressing if oozing noted  -Offset pressure.     FEN   - Diet - DASH diet  - Dysphagia  SLP - evaluation and treatment    Precautions / PROPHYLAXIS:   - Falls, Cardiac  - ortho: Weight bearing status: WBAT   - Lungs: Aspiration  - Pressure injury/Skin:  OOB to Chair, PT/OT      Danilo Javier  Pulmonary Disease  [address] [place] [id number]  Phone: [phone]  Fax: [phone]  Follow Up Time: 2 weeks

## 2024-11-12 NOTE — PROGRESS NOTE ADULT - SUBJECTIVE AND OBJECTIVE BOX
SUBJECTIVE/ROS: patient seen at bedside. He is tolerating down titration of mirapex. He has no new complaints this morning and is tolerating therapy well. He otherwise denies chest pain, fever, chills, nausea, vomiting, abdominal pain, headache, or BLE pain.         HPI:  74 year old male with PMH of HTN, HLD, CAD s/p PCI/CABG x 2 (last 8 years ago), BPH, Parkinson's Disease (dx 8 years ago, follows with Dr. Claudio) who presented to [facility] on 11/6 from home for frequent falls and hallucinations. He has history of occasional falls with increased frequency over the last 1-2 weeks (2-3 times this week) attributed to gait instability due to stiffness and feeling "frozen". Due to these falls, he has been more immobile and over last weekend, family noted increased LE edema for which he takes PRN lasix. Patient also endorses increase hallucination episodes (e.g. snakes in the house, having boots on when he didn't). In the ED, he was afebrile and hemodynamically stable. CTH is negative for acute intracranial pathology. LE duplex negative for DVT. He was evaluated by PT, OT, and PMR and  recommended for inpatient acute rehab. Neurology consulted for medication optimization for Parkinson's disease. Pt given lasix for b/l LE edema. He was admitted to [facility] On 11/8/24.     Vital Signs Last 24 Hrs  T(C): 36.6 (11 Nov 2024 20:40), Max: 36.6 (11 Nov 2024 20:40)  T(F): 97.9 (11 Nov 2024 20:40), Max: 97.9 (11 Nov 2024 20:40)  HR: 66 (12 Nov 2024 08:37) (66 - 67)  BP: 137/79 (12 Nov 2024 07:05) (137/79 - 152/80)  BP(mean): --  RR: 16 (12 Nov 2024 08:37) (16 - 16)  SpO2: 97% (12 Nov 2024 08:37) (97% - 99%)    Parameters below as of 11 Nov 2024 20:40  Patient On (Oxygen Delivery Method): room air        PHYSICAL EXAM  Constitutional - NAD, Comfortable  HEENT - NCAT, EOMI  Neck - Supple, No limited ROM  Chest - Breathing comfortably in room air   Extremities - Mild bilateral LE edema, no calf tenderness   Neurologic Exam - sitting comfortably in the chair, no dyskinesias at time of visit, no tremor. Mild hypophonia. Mild rigidity. Moderate bradykinesia worse on the R.     RECENT LABS:                        14.2   8.57  )-----------( 143      ( 11 Nov 2024 07:00 )             41.4     11-11    134[L]  |  101  |  28[H]  ----------------------------<  94  4.8   |  27  |  1.21    Ca    9.2      11 Nov 2024 07:00    TPro  7.1  /  Alb  3.5  /  TBili  1.1  /  DBili  x   /  AST  14  /  ALT  11  /  AlkPhos  71  11-11    LIVER FUNCTIONS - ( 11 Nov 2024 07:00 )  Alb: 3.5 g/dL / Pro: 7.1 g/dL / ALK PHOS: 71 U/L / ALT: 11 U/L / AST: 14 U/L / GGT: x               MEDICATIONS  (STANDING):  aspirin enteric coated 81 milliGRAM(s) Oral at bedtime  carbidopa/levodopa  25/250 1 Tablet(s) Oral <User Schedule>  carbidopa/levodopa CR 25/100 1 Tablet(s) Oral <User Schedule>  donepezil 10 milliGRAM(s) Oral at bedtime  enoxaparin Injectable 40 milliGRAM(s) SubCutaneous every 24 hours  entacapone 200 milliGRAM(s) Oral <User Schedule>  metoprolol succinate  milliGRAM(s) Oral daily  niacin  milliGRAM(s) Oral at bedtime  polyethylene glycol 3350 17 Gram(s) Oral daily  pramipexole 0.75 milliGRAM(s) Oral three times a day  rosuvastatin 10 milliGRAM(s) Oral at bedtime  senna 2 Tablet(s) Oral at bedtime  tamsulosin 0.4 milliGRAM(s) Oral at bedtime    MEDICATIONS  (PRN):  acetaminophen     Tablet .. 650 milliGRAM(s) Oral every 6 hours PRN Mild Pain (1 - 3)  melatonin 3 milliGRAM(s) Oral at bedtime PRN Insomnia   SUBJECTIVE/ROS: patient seen at bedside. He is tolerating down titration of mirapex. He has no new complaints this morning and is tolerating therapy well. He otherwise denies chest pain, fever, chills, nausea, vomiting, abdominal pain, headache, or BLE pain.         HPI:  74 year old male with PMH of HTN, HLD, CAD s/p PCI/CABG x 2 (last 8 years ago), BPH, Parkinson's Disease (dx 8 years ago, follows with Dr. Claudio) who presented to [facility] on 11/6 from home for frequent falls and hallucinations. He has history of occasional falls with increased frequency over the last 1-2 weeks (2-3 times this week) attributed to gait instability due to stiffness and feeling "frozen". Due to these falls, he has been more immobile and over last weekend, family noted increased LE edema for which he takes PRN lasix. Patient also endorses increase hallucination episodes (e.g. snakes in the house, having boots on when he didn't). In the ED, he was afebrile and hemodynamically stable. CTH is negative for acute intracranial pathology. LE duplex negative for DVT. He was evaluated by PT, OT, and PMR and  recommended for inpatient acute rehab. Neurology consulted for medication optimization for Parkinson's disease. Pt given lasix for b/l LE edema. He was admitted to [facility] On 11/8/24.     Vital Signs Last 24 Hrs  T(C): 36.6 (11 Nov 2024 20:40), Max: 36.6 (11 Nov 2024 20:40)  T(F): 97.9 (11 Nov 2024 20:40), Max: 97.9 (11 Nov 2024 20:40)  HR: 66 (12 Nov 2024 08:37) (66 - 67)  BP: 137/79 (12 Nov 2024 07:05) (137/79 - 152/80)  RR: 16 (12 Nov 2024 08:37) (16 - 16)  SpO2: 97% (12 Nov 2024 08:37) (97% - 99%)        PHYSICAL EXAM  Constitutional - NAD, Comfortable  HEENT - NCAT, EOMI  Neck - Supple, No limited ROM  Chest - Breathing comfortably in room air   Extremities - Mild bilateral LE edema, no calf tenderness   Neurologic Exam - sitting comfortably in the chair, no dyskinesias at time of visit, no tremor. Mild hypophonia. Mild rigidity. Moderate bradykinesia worse on the R.     RECENT LABS:                        14.2   8.57  )-----------( 143      ( 11 Nov 2024 07:00 )             41.4     11-11    134[L]  |  101  |  28[H]  ----------------------------<  94  4.8   |  27  |  1.21    Ca    9.2      11 Nov 2024 07:00    TPro  7.1  /  Alb  3.5  /  TBili  1.1  /  DBili  x   /  AST  14  /  ALT  11  /  AlkPhos  71  11-11    LIVER FUNCTIONS - ( 11 Nov 2024 07:00 )  Alb: 3.5 g/dL / Pro: 7.1 g/dL / ALK PHOS: 71 U/L / ALT: 11 U/L / AST: 14 U/L / GGT: x               MEDICATIONS  (STANDING):  aspirin enteric coated 81 milliGRAM(s) Oral at bedtime  carbidopa/levodopa  25/250 1 Tablet(s) Oral <User Schedule>  carbidopa/levodopa CR 25/100 1 Tablet(s) Oral <User Schedule>  donepezil 10 milliGRAM(s) Oral at bedtime  enoxaparin Injectable 40 milliGRAM(s) SubCutaneous every 24 hours  entacapone 200 milliGRAM(s) Oral <User Schedule>  metoprolol succinate  milliGRAM(s) Oral daily  niacin  milliGRAM(s) Oral at bedtime  polyethylene glycol 3350 17 Gram(s) Oral daily  pramipexole 0.75 milliGRAM(s) Oral three times a day  rosuvastatin 10 milliGRAM(s) Oral at bedtime  senna 2 Tablet(s) Oral at bedtime  tamsulosin 0.4 milliGRAM(s) Oral at bedtime    MEDICATIONS  (PRN):  acetaminophen     Tablet .. 650 milliGRAM(s) Oral every 6 hours PRN Mild Pain (1 - 3)  melatonin 3 milliGRAM(s) Oral at bedtime PRN Insomnia

## 2024-11-13 PROCEDURE — 99232 SBSQ HOSP IP/OBS MODERATE 35: CPT

## 2024-11-13 RX ORDER — CLONAZEPAM 0.12 MG/1
0.12 TABLET, ORALLY DISINTEGRATING ORAL AT BEDTIME
Refills: 0 | Status: DISCONTINUED | OUTPATIENT
Start: 2024-11-13 | End: 2024-11-14

## 2024-11-13 RX ORDER — BACITRACIN ZINC 500 UNIT/G
1 OINTMENT (GRAM) TOPICAL
Refills: 0 | Status: DISCONTINUED | OUTPATIENT
Start: 2024-11-13 | End: 2024-11-22

## 2024-11-13 RX ADMIN — CLONAZEPAM 0.12 MILLIGRAM(S): 0.12 TABLET, ORALLY DISINTEGRATING ORAL at 21:14

## 2024-11-13 RX ADMIN — CARBIDOPA AND LEVODOPA 1 TABLET(S): 10; 100 TABLET ORAL at 18:46

## 2024-11-13 RX ADMIN — PRAMIPEXOLE 0.75 MILLIGRAM(S): 1.5 TABLET, EXTENDED RELEASE ORAL at 21:15

## 2024-11-13 RX ADMIN — CARBIDOPA AND LEVODOPA 1 TABLET(S): 10; 100 TABLET ORAL at 07:14

## 2024-11-13 RX ADMIN — PRAMIPEXOLE 0.75 MILLIGRAM(S): 1.5 TABLET, EXTENDED RELEASE ORAL at 07:15

## 2024-11-13 RX ADMIN — CARBIDOPA AND LEVODOPA 1 TABLET(S): 10; 100 TABLET ORAL at 23:44

## 2024-11-13 RX ADMIN — ENTACAPONE 200 MILLIGRAM(S): 200 TABLET, FILM COATED ORAL at 07:15

## 2024-11-13 RX ADMIN — ENTACAPONE 200 MILLIGRAM(S): 200 TABLET, FILM COATED ORAL at 18:46

## 2024-11-13 RX ADMIN — TAMSULOSIN HYDROCHLORIDE 0.4 MILLIGRAM(S): 0.4 CAPSULE ORAL at 21:14

## 2024-11-13 RX ADMIN — CARBIDOPA AND LEVODOPA 1 TABLET(S): 10; 100 TABLET ORAL at 14:56

## 2024-11-13 RX ADMIN — DONEPEZIL HYDROCHLORIDE 10 MILLIGRAM(S): 5 TABLET, FILM COATED ORAL at 21:15

## 2024-11-13 RX ADMIN — ENOXAPARIN SODIUM 40 MILLIGRAM(S): 30 INJECTION SUBCUTANEOUS at 07:20

## 2024-11-13 RX ADMIN — ENTACAPONE 200 MILLIGRAM(S): 200 TABLET, FILM COATED ORAL at 11:31

## 2024-11-13 RX ADMIN — Medication 1 APPLICATION(S): at 18:46

## 2024-11-13 RX ADMIN — PRAMIPEXOLE 0.75 MILLIGRAM(S): 1.5 TABLET, EXTENDED RELEASE ORAL at 13:34

## 2024-11-13 RX ADMIN — CARBIDOPA AND LEVODOPA 1 TABLET(S): 10; 100 TABLET ORAL at 00:12

## 2024-11-13 RX ADMIN — Medication 81 MILLIGRAM(S): at 21:15

## 2024-11-13 RX ADMIN — CARBIDOPA AND LEVODOPA 1 TABLET(S): 10; 100 TABLET ORAL at 11:31

## 2024-11-13 RX ADMIN — Medication 500 MILLIGRAM(S): at 21:15

## 2024-11-13 RX ADMIN — ROSUVASTATIN CALCIUM 10 MILLIGRAM(S): 5 TABLET, FILM COATED ORAL at 21:14

## 2024-11-13 RX ADMIN — ENTACAPONE 200 MILLIGRAM(S): 200 TABLET, FILM COATED ORAL at 14:56

## 2024-11-13 NOTE — PROGRESS NOTE ADULT - ASSESSMENT
74 year old male with PMH of HTN, HLD, CAD s/p PCI/CABG x 2, BPH, Parkinson's Disease who presented to [facility] on 11/6 from home for frequent falls and hallucinations. Falls have been increasing in frequency, now occurring 2-3x a week attributed to gait instability due to stiffness and feeling "frozen". The falls have led him to become more immobile leading to worsening LE swelling. Patient also endorses increase hallucination episodes (e.g. snakes in the house). Medical workup significant for TOYA, negative for infection and acute intracranial pathology. S/P lasix for b/l LE edema. He was subsequently referred to [facility] for acute inpatient rehab On 11/8/24.   #Parkinson's Disease  #Rigidity/Bradykinesia/falls  -Comprehensive Rehab Program  -Continue Sinemet 25/250 mg 1 tab at 7am, 11am, 3pm, and 7pm with dose of entacapone 200mg at same timing  -Continue Sinemet 25/100mg CR 1 tab at 12am   -Mirapex 1mg TID  -aricept 10mg daily   -melatonin PRN   -further medication changes per movement disorder specialist.   -fall, aspiration precautions      #Orthostatic hypotension  -Currently on metoprolol and flomax - monitor for continued need  -Monitor OH vital signs  #HTN  #CAD - s/p remote CABG and PCI  # HLD  -Continue metoprolol 100mg ER   -Niacin 500mg daily   -Crestor 10mg daily   -Continue aspirin 81mg daily   -monitor vitals    #BL lower extremity edema  - B/L lower extremity venous doppler (11/04/2024): negative for DVT  - s/p lasix 40mg po x 3 days as inpatient - subsequent TOYA, would avoid further use for now and use conservative management  - elevate legs daily  - TTE: EF 50-55%. cacified AS, AR. Bi Atrial enlargement  - monitor    # LLE blister/wound  - blister has been broken. now oozing edema fluid. no sign of infection. does not look like pressure injury  - wound care per primary  - offload pressure  - avoid rubbing  - elevate legs    # Abnormal TSH  - TSh mildly elevated  - no symptoms  - repeat TFT in 4-6 weeks    # mild hyponatremia  - sodium 134  - monitor      #Constipation  - Continue Senna at bedtime   - Miralax daily    #BPH  -monitor PVR q 8 hours (SC if > 400)  -Monitor UO  -Continue flomax    #DVT Prophylaxis  - Lovenox    d/w rehab team at [facility]

## 2024-11-13 NOTE — PROGRESS NOTE ADULT - SUBJECTIVE AND OBJECTIVE BOX
Medicine Progress Note    Patient is a 74y old  Male who presents with a chief complaint of Parkinson's (10 Nov 2024 10:13)      SUBJECTIVE / OVERNIGHT EVENTS:  No acute events overnight. No new complaints. Patient seen and examined at bedside.     MEDICATIONS  (STANDING):  aspirin enteric coated 81 milliGRAM(s) Oral at bedtime  bacitracin   Ointment 1 Application(s) Topical two times a day  carbidopa/levodopa  25/250 1 Tablet(s) Oral <User Schedule>  carbidopa/levodopa CR 25/100 1 Tablet(s) Oral <User Schedule>  clonazePAM Oral Disintegrating Tablet 0.125 milliGRAM(s) Oral at bedtime  donepezil 10 milliGRAM(s) Oral at bedtime  enoxaparin Injectable 40 milliGRAM(s) SubCutaneous every 24 hours  entacapone 200 milliGRAM(s) Oral <User Schedule>  metoprolol succinate  milliGRAM(s) Oral daily  niacin  milliGRAM(s) Oral at bedtime  pramipexole 0.75 milliGRAM(s) Oral three times a day  rosuvastatin 10 milliGRAM(s) Oral at bedtime  senna 2 Tablet(s) Oral at bedtime  tamsulosin 0.4 milliGRAM(s) Oral at bedtime    MEDICATIONS  (PRN):  acetaminophen     Tablet .. 650 milliGRAM(s) Oral every 6 hours PRN Mild Pain (1 - 3)  melatonin 3 milliGRAM(s) Oral at bedtime PRN Insomnia    T(C): 36.3 (11-13-24 @ 07:58), Max: 36.4 (11-12-24 @ 22:15)  T(F): 97.3 (11-13-24 @ 07:58), Max: 97.6 (11-12-24 @ 22:15)  HR: 65 (11-13-24 @ 07:58) (64 - 66)  BP: 103/61 (11-13-24 @ 07:11) (103/61 - 149/74)  ABP: --  ABP(mean): --  RR: 16 (11-13-24 @ 07:58) (16 - 16)  SpO2: 96% (11-13-24 @ 07:58) (96% - 98%)        GENERAL: Not in distress. Alert    HEENT: clear conjuctiva, MMM. no pallor or icterus  CARDIOVASCULAR: RRR S1, S2. No murmur/rubs/gallop  LUNGS: BLAE+, no rales, no wheezing, no rhonchi.    ABDOMEN: ND. Soft,  NT, no guarding / rebound / rigidity. BS normoactive  BACK: No spine tenderness.  EXTREMITIES: no edema. no leg or calf TP. LLE open blister with some oozing od edema fluid. does not look infected.   SKIN: warm and dry  PSYCHIATRIC: Calm.  No agitation.  CNS: AAO. moves limbs, follows commands. rigidity, bradykinesia, hypophonia    LABS:                        14.2   8.57  )-----------( 143      ( 11 Nov 2024 07:00 )             41.4     11-11    134[L]  |  101  |  28[H]  ----------------------------<  94  4.8   |  27  |  1.21    Ca    9.2      11 Nov 2024 07:00    TPro  7.1  /  Alb  3.5  /  TBili  1.1  /  DBili  x   /  AST  14  /  ALT  11  /  AlkPhos  71  11-11          Urinalysis Basic - ( 11 Nov 2024 07:00 )    Color: x / Appearance: x / SG: x / pH: x  Gluc: 94 mg/dL / Ketone: x  / Bili: x / Urobili: x   Blood: x / Protein: x / Nitrite: x   Leuk Esterase: x / RBC: x / WBC x   Sq Epi: x / Non Sq Epi: x / Bacteria: x        COVID-19 PCR: NotDetec (08 Nov 2024 16:40)      RADIOLOGY & ADDITIONAL TESTS:  Imaging from Last 24 Hours:    Electrocardiogram/QTc Interval:    COORDINATION OF CARE:  Care Discussed with Consultants/Other Providers:

## 2024-11-13 NOTE — PROGRESS NOTE ADULT - SUBJECTIVE AND OBJECTIVE BOX
SUBJECTIVE/ROS: Patient seen at bedside. He was noted to have symptoms of RBD with mild nonthreatening hallucinations at times. He is open to trial of clonazepam and will start today. Also, to consider starting nuplazid for hallucinations if amenable. He denies chest pain, fever, chills, nausea, vomiting, abdominal pain, headache, or BLE pain.         HPI:  74 year old male with PMH of HTN, HLD, CAD s/p PCI/CABG x 2 (last 8 years ago), BPH, Parkinson's Disease (dx 8 years ago, follows with Dr. Claudio) who presented to [facility] on 11/6 from home for frequent falls and hallucinations. He has history of occasional falls with increased frequency over the last 1-2 weeks (2-3 times this week) attributed to gait instability due to stiffness and feeling "frozen". Due to these falls, he has been more immobile and over last weekend, family noted increased LE edema for which he takes PRN lasix. Patient also endorses increase hallucination episodes (e.g. snakes in the house, having boots on when he didn't). In the ED, he was afebrile and hemodynamically stable. CTH is negative for acute intracranial pathology. LE duplex negative for DVT. He was evaluated by PT, OT, and PMR and  recommended for inpatient acute rehab. Neurology consulted for medication optimization for Parkinson's disease. Pt given lasix for b/l LE edema. He was admitted to [facility] On 11/8/24.       Vital Signs Last 24 Hrs  T(C): 36.3 (13 Nov 2024 07:58), Max: 36.4 (12 Nov 2024 22:15)  T(F): 97.3 (13 Nov 2024 07:58), Max: 97.6 (12 Nov 2024 22:15)  HR: 65 (13 Nov 2024 07:58) (64 - 66)  BP: 103/61 (13 Nov 2024 07:11) (103/61 - 149/74)  BP(mean): --  RR: 16 (13 Nov 2024 07:58) (16 - 16)  SpO2: 96% (13 Nov 2024 07:58) (96% - 98%)    Parameters below as of 13 Nov 2024 07:58  Patient On (Oxygen Delivery Method): room air          PHYSICAL EXAM  Constitutional - NAD, Comfortable  HEENT - NCAT, EOMI  Neck - Supple, No limited ROM  Chest - Breathing comfortably in room air   Extremities - Mild bilateral LE edema, no calf tenderness   Neurologic Exam - sitting comfortably in the chair, no dyskinesias at time of visit, no tremor. Mild hypophonia. Mild rigidity. Moderate bradykinesia worse on the R.     RECENT LABS:                        14.2   8.57  )-----------( 143      ( 11 Nov 2024 07:00 )             41.4     11-11    134[L]  |  101  |  28[H]  ----------------------------<  94  4.8   |  27  |  1.21    Ca    9.2      11 Nov 2024 07:00    TPro  7.1  /  Alb  3.5  /  TBili  1.1  /  DBili  x   /  AST  14  /  ALT  11  /  AlkPhos  71  11-11    LIVER FUNCTIONS - ( 11 Nov 2024 07:00 )  Alb: 3.5 g/dL / Pro: 7.1 g/dL / ALK PHOS: 71 U/L / ALT: 11 U/L / AST: 14 U/L / GGT: x               MEDICATIONS  (STANDING):  aspirin enteric coated 81 milliGRAM(s) Oral at bedtime  bacitracin   Ointment 1 Application(s) Topical two times a day  carbidopa/levodopa  25/250 1 Tablet(s) Oral <User Schedule>  carbidopa/levodopa CR 25/100 1 Tablet(s) Oral <User Schedule>  clonazePAM Oral Disintegrating Tablet 0.125 milliGRAM(s) Oral at bedtime  donepezil 10 milliGRAM(s) Oral at bedtime  enoxaparin Injectable 40 milliGRAM(s) SubCutaneous every 24 hours  entacapone 200 milliGRAM(s) Oral <User Schedule>  metoprolol succinate  milliGRAM(s) Oral daily  niacin  milliGRAM(s) Oral at bedtime  pramipexole 0.75 milliGRAM(s) Oral three times a day  rosuvastatin 10 milliGRAM(s) Oral at bedtime  senna 2 Tablet(s) Oral at bedtime  tamsulosin 0.4 milliGRAM(s) Oral at bedtime    MEDICATIONS  (PRN):  acetaminophen     Tablet .. 650 milliGRAM(s) Oral every 6 hours PRN Mild Pain (1 - 3)  melatonin 3 milliGRAM(s) Oral at bedtime PRN Insomnia   SUBJECTIVE/ROS: Patient seen at bedside. He was noted to have symptoms of RBD with mild nonthreatening hallucinations at times. He is open to trial of clonazepam and will start today. Also, to consider starting nuplazid for hallucinations if amenable. He denies chest pain, fever, chills, nausea, vomiting, abdominal pain, headache, or BLE pain.         HPI:  74 year old male with PMH of HTN, HLD, CAD s/p PCI/CABG x 2 (last 8 years ago), BPH, Parkinson's Disease (dx 8 years ago, follows with Dr. Claudio) who presented to [facility] on 11/6 from home for frequent falls and hallucinations. He has history of occasional falls with increased frequency over the last 1-2 weeks (2-3 times this week) attributed to gait instability due to stiffness and feeling "frozen". Due to these falls, he has been more immobile and over last weekend, family noted increased LE edema for which he takes PRN lasix. Patient also endorses increase hallucination episodes (e.g. snakes in the house, having boots on when he didn't). In the ED, he was afebrile and hemodynamically stable. CTH is negative for acute intracranial pathology. LE duplex negative for DVT. He was evaluated by PT, OT, and PMR and  recommended for inpatient acute rehab. Neurology consulted for medication optimization for Parkinson's disease. Pt given lasix for b/l LE edema. He was admitted to [facility] On 11/8/24.       Vital Signs Last 24 Hrs  T(C): 36.3 (13 Nov 2024 07:58), Max: 36.4 (12 Nov 2024 22:15)  T(F): 97.3 (13 Nov 2024 07:58), Max: 97.6 (12 Nov 2024 22:15)  HR: 65 (13 Nov 2024 07:58) (64 - 66)  BP: 103/61 (13 Nov 2024 07:11) (103/61 - 149/74)  RR: 16 (13 Nov 2024 07:58) (16 - 16)  SpO2: 96% (13 Nov 2024 07:58) (96% - 98%)      PHYSICAL EXAM  Constitutional - NAD, Comfortable  HEENT - NCAT, EOMI  Neck - Supple, No limited ROM  Chest - Breathing comfortably in room air   Extremities - Mild bilateral LE edema, no calf tenderness   Neurologic Exam - sitting comfortably in the chair, no dyskinesias at time of visit, no tremor. Mild hypophonia. Mild rigidity. Moderate bradykinesia worse on the R.     RECENT LABS:                        14.2   8.57  )-----------( 143      ( 11 Nov 2024 07:00 )             41.4     11-11    134[L]  |  101  |  28[H]  ----------------------------<  94  4.8   |  27  |  1.21    Ca    9.2      11 Nov 2024 07:00    TPro  7.1  /  Alb  3.5  /  TBili  1.1  /  DBili  x   /  AST  14  /  ALT  11  /  AlkPhos  71  11-11    LIVER FUNCTIONS - ( 11 Nov 2024 07:00 )  Alb: 3.5 g/dL / Pro: 7.1 g/dL / ALK PHOS: 71 U/L / ALT: 11 U/L / AST: 14 U/L / GGT: x               MEDICATIONS  (STANDING):  aspirin enteric coated 81 milliGRAM(s) Oral at bedtime  bacitracin   Ointment 1 Application(s) Topical two times a day  carbidopa/levodopa  25/250 1 Tablet(s) Oral <User Schedule>  carbidopa/levodopa CR 25/100 1 Tablet(s) Oral <User Schedule>  clonazePAM Oral Disintegrating Tablet 0.125 milliGRAM(s) Oral at bedtime  donepezil 10 milliGRAM(s) Oral at bedtime  enoxaparin Injectable 40 milliGRAM(s) SubCutaneous every 24 hours  entacapone 200 milliGRAM(s) Oral <User Schedule>  metoprolol succinate  milliGRAM(s) Oral daily  niacin  milliGRAM(s) Oral at bedtime  pramipexole 0.75 milliGRAM(s) Oral three times a day  rosuvastatin 10 milliGRAM(s) Oral at bedtime  senna 2 Tablet(s) Oral at bedtime  tamsulosin 0.4 milliGRAM(s) Oral at bedtime    MEDICATIONS  (PRN):  acetaminophen     Tablet .. 650 milliGRAM(s) Oral every 6 hours PRN Mild Pain (1 - 3)  melatonin 3 milliGRAM(s) Oral at bedtime PRN Insomnia

## 2024-11-13 NOTE — PROGRESS NOTE ADULT - ASSESSMENT
74 year old male with PMH of HTN, HLD, CAD s/p PCI/CABG x 2 (last 8 years ago), BPH, Parkinson's Disease (dx 8 years ago, follows with Dr. Claudio) who presented to [facility] on 11/6 from home for frequent falls and hallucinations. He has history of occasional falls with increased frequency over the last 1-2 weeks (2-3 times this week) attributed to gait instability due to stiffness and feeling "frozen". Due to these falls, he has been more immobile and over last weekend, family noted increased LE edema for which he takes PRN lasix. Patient also endorses increase hallucination episodes (e.g. snakes in the house, having boots on when he didn't). In the ED, he was afebrile and hemodynamically stable. CTH is negative for acute intracranial pathology. LE duplex negative for DVT. He was evaluated by PT, OT, and PMR and  recommended for inpatient acute rehab. Neurology consulted for medication optimization for Parkinson's disease. Pt given lasix for b/l LE edema. He was admitted to [facility] On 11/8/24.     #Parkinson's Disease now with gait Instability, ADL impairments and Functional impairments  - Continue Comprehensive Rehab Program of PT/OT/SLP    ------------------------------------------------------  Parkinson's related motor symptoms    #Rigidity/Bradykinesia/falls   -Continue Sinemet 25/250 mg 1 tab at 7am, 11am, 3pm, and 7pm with dose of entacapone 200 mg at same timing - to monitor symptoms while titrating down pramipexole, potentially to consider more frequent lower dosing and/or opicapone   -Continue Sinemet 25/100mg CR 1 tab at 12am   -Mirapex reduce 0.75 mg TID, if tolerated will go down to 0.50 mg TID in 2-3 days, then 0.25 mg in 2-3 days, then 0.125 mg TID to BID to daily, then stop    -Follows with Dr. Veras  -Movement disorders following    --------------------------------------------------------  Parkinson's related non-motor symptoms    #Mood/memory/sleep  -Continue aricept 10mg daily   -Continue melatonin PRN   -Start clonazepam 0.125mg at bedtime 11/13  -Consider nuplazid for mild hallucinations   -Neuropsych to see   -Rec therapy     #Orthostatic hypotension  -Monitor OH vital signs  -Currently on metoprolol and flomax - monitor for continued need   - no significant orthostasis for now     #Pain control  - Tylenol PRN    #GI/Bowel Management/Constipation  - Continue Senna at bedtime   - Miralax daily    #Bladder management/BPH  - Continue to monitor PVR q 8 hours (SC if > 400)  - Monitor UO  - Continue flomax - monitor OH    ----------------------------------------------------------  Concurrent medical problems    #Mildly elevated TSH   - T3, T4 added -pending  -TSH 4.05    #HTN  #CAD - s/p CABG and PCI in the past  -Continue metoprolol 100mg ER - monitor vitals  -EKG  -Niacin 500mg daily   -Crestor 10mg daily   -Continue aspirin 81mg daily     #bilateral lower extremity edema -with blister  - B/L lower extremity venous doppler (11/04/2024): negative for DVT  - s/p lasix 40mg po x 3 days as inpatient- monitor for need   - elevate legs daily  - ACE wrap for compression if needed  - Echo TTE (11/10):  moderate calcific aortic stenosis with mild aortic insufficiency, biatrial enlargement, left ventricular ejection fraction, by visual estimation, 55 to 60%, normal global left ventricular systolic function, moderate mitral annular calcification, trace mitral valve regurgitation, dilatation of the aortic root, Dimesionless Index value is .49.    #DVT Prophylaxis  - Lovenox  - TEDs   - LE duplex 11/4 - negative for DVT    #Skin:  -Left leg blister - elevate leg and apply foam dressing if oozing noted  -Offset pressure.     FEN   - Diet - DASH diet  - Dysphagia  SLP - evaluation and treatment    Precautions / PROPHYLAXIS:   - Falls, Cardiac  - ortho: Weight bearing status: WBAT   - Lungs: Aspiration  - Pressure injury/Skin:  OOB to Chair, PT/OT      Danilo Javier  Pulmonary Disease  [address] [place] [id number]  Phone: [phone]  Fax: [phone]  Follow Up Time: 2 weeks   74 year old male with PMH of HTN, HLD, CAD s/p PCI/CABG x 2 (last 8 years ago), BPH, Parkinson's Disease (dx 8 years ago, follows with Dr. Claudio) who presented to [facility] on 11/6 from home for frequent falls and hallucinations. He has history of occasional falls with increased frequency over the last 1-2 weeks (2-3 times this week) attributed to gait instability due to stiffness and feeling "frozen". Due to these falls, he has been more immobile and over last weekend, family noted increased LE edema for which he takes PRN lasix. Patient also endorses increase hallucination episodes (e.g. snakes in the house, having boots on when he didn't). In the ED, he was afebrile and hemodynamically stable. CTH is negative for acute intracranial pathology. LE duplex negative for DVT. He was evaluated by PT, OT, and PMR and  recommended for inpatient acute rehab. Neurology consulted for medication optimization for Parkinson's disease. Pt given lasix for b/l LE edema. He was admitted to [facility] On 11/8/24.     #Parkinson's Disease now with gait Instability, ADL impairments and Functional impairments  - Continue Comprehensive Rehab Program of PT/OT/SLP    ------------------------------------------------------  Parkinson's related motor symptoms    #Rigidity/Bradykinesia/falls   -Continue Sinemet 25/250 mg 1 tab at 7am, 11am, 3pm, and 7pm with dose of entacapone 200 mg at same timing - to monitor symptoms while titrating down pramipexole, potentially to consider more frequent lower dosing and/or opicapone   -Continue Sinemet 25/100mg CR 1 tab at 12am   -Mirapex reduce 0.75 mg TID, if tolerated will go down to 0.50 mg TID in 2-3 days, then 0.25 mg in 2-3 days, then 0.125 mg TID to BID to daily, then stop    -Follows with Dr. Veras  -Movement disorders following    --------------------------------------------------------  Parkinson's related non-motor symptoms    #Mood/memory/sleep  -Continue aricept 10mg daily   -Continue melatonin PRN   -Start clonazepam 0.125mg at bedtime 11/13  -Consider Nuplazid for mild hallucinations   -Neuropsych to see   -Rec therapy     #Orthostatic hypotension  -Monitor OH vital signs  -Currently on metoprolol and flomax - monitor for continued need   - no significant orthostasis for now     #Pain control  - Tylenol PRN    #GI/Bowel Management/Constipation  - Continue Senna at bedtime   - Miralax daily    #Bladder management/BPH  - Continue to monitor PVR q 8 hours (SC if > 400)  - Monitor UO  - Continue flomax - monitor OH    ----------------------------------------------------------  Concurrent medical problems    #Mildly elevated TSH   - T3, T4 added -pending  -TSH 4.05    #HTN  #CAD - s/p CABG and PCI in the past  -Continue metoprolol 100mg ER - monitor vitals  -EKG  -Niacin 500mg daily   -Crestor 10mg daily   -Continue aspirin 81mg daily     #bilateral lower extremity edema -with blister  - B/L lower extremity venous doppler (11/04/2024): negative for DVT  - s/p lasix 40mg po x 3 days as inpatient- monitor for need   - elevate legs daily  - ACE wrap for compression if needed  - Echo TTE (11/10):  moderate calcific aortic stenosis with mild aortic insufficiency, biatrial enlargement, left ventricular ejection fraction, by visual estimation, 55 to 60%, normal global left ventricular systolic function, moderate mitral annular calcification, trace mitral valve regurgitation, dilatation of the aortic root, Dimesionless Index value is .49.    #DVT Prophylaxis  - Lovenox  - TEDs   - LE duplex 11/4 - negative for DVT    #Skin:  -Left leg blister - elevate leg and apply foam dressing if oozing noted  -Offset pressure.     FEN   - Diet - DASH diet  - Dysphagia  SLP - evaluation and treatment    Precautions / PROPHYLAXIS:   - Falls, Cardiac  - ortho: Weight bearing status: WBAT   - Lungs: Aspiration  - Pressure injury/Skin:  OOB to Chair, PT/OT      Danilo Javier  Pulmonary Disease  [address] [place] [id number]  Phone: [phone]  Fax: [phone]  Follow Up Time: 2 weeks

## 2024-11-14 LAB
ALBUMIN SERPL ELPH-MCNC: 3.9 G/DL — SIGNIFICANT CHANGE UP (ref 3.3–5)
ALP SERPL-CCNC: 81 U/L — SIGNIFICANT CHANGE UP (ref 40–120)
ALT FLD-CCNC: 12 U/L — SIGNIFICANT CHANGE UP (ref 10–45)
ANION GAP SERPL CALC-SCNC: 9 MMOL/L — SIGNIFICANT CHANGE UP (ref 5–17)
AST SERPL-CCNC: 16 U/L — SIGNIFICANT CHANGE UP (ref 10–40)
BASOPHILS # BLD AUTO: 0.06 K/UL — SIGNIFICANT CHANGE UP (ref 0–0.2)
BASOPHILS NFR BLD AUTO: 0.9 % — SIGNIFICANT CHANGE UP (ref 0–2)
BILIRUB SERPL-MCNC: 1.4 MG/DL — HIGH (ref 0.2–1.2)
BUN SERPL-MCNC: 31 MG/DL — HIGH (ref 7–23)
CALCIUM SERPL-MCNC: 9.4 MG/DL — SIGNIFICANT CHANGE UP (ref 8.4–10.5)
CHLORIDE SERPL-SCNC: 102 MMOL/L — SIGNIFICANT CHANGE UP (ref 96–108)
CO2 SERPL-SCNC: 27 MMOL/L — SIGNIFICANT CHANGE UP (ref 22–31)
CREAT SERPL-MCNC: 1.4 MG/DL — HIGH (ref 0.5–1.3)
EGFR: 52 ML/MIN/1.73M2 — LOW
EOSINOPHIL # BLD AUTO: 0.15 K/UL — SIGNIFICANT CHANGE UP (ref 0–0.5)
EOSINOPHIL NFR BLD AUTO: 2.3 % — SIGNIFICANT CHANGE UP (ref 0–6)
GLUCOSE SERPL-MCNC: 93 MG/DL — SIGNIFICANT CHANGE UP (ref 70–99)
HCT VFR BLD CALC: 42.3 % — SIGNIFICANT CHANGE UP (ref 39–50)
HGB BLD-MCNC: 14.3 G/DL — SIGNIFICANT CHANGE UP (ref 13–17)
IMM GRANULOCYTES NFR BLD AUTO: 0.5 % — SIGNIFICANT CHANGE UP (ref 0–0.9)
LYMPHOCYTES # BLD AUTO: 1.6 K/UL — SIGNIFICANT CHANGE UP (ref 1–3.3)
LYMPHOCYTES # BLD AUTO: 24.8 % — SIGNIFICANT CHANGE UP (ref 13–44)
MCHC RBC-ENTMCNC: 28.1 PG — SIGNIFICANT CHANGE UP (ref 27–34)
MCHC RBC-ENTMCNC: 33.8 G/DL — SIGNIFICANT CHANGE UP (ref 32–36)
MCV RBC AUTO: 83.3 FL — SIGNIFICANT CHANGE UP (ref 80–100)
MONOCYTES # BLD AUTO: 0.61 K/UL — SIGNIFICANT CHANGE UP (ref 0–0.9)
MONOCYTES NFR BLD AUTO: 9.5 % — SIGNIFICANT CHANGE UP (ref 2–14)
NEUTROPHILS # BLD AUTO: 4 K/UL — SIGNIFICANT CHANGE UP (ref 1.8–7.4)
NEUTROPHILS NFR BLD AUTO: 62 % — SIGNIFICANT CHANGE UP (ref 43–77)
NRBC # BLD: 0 /100 WBCS — SIGNIFICANT CHANGE UP (ref 0–0)
PLATELET # BLD AUTO: 141 K/UL — LOW (ref 150–400)
POTASSIUM SERPL-MCNC: 4.2 MMOL/L — SIGNIFICANT CHANGE UP (ref 3.5–5.3)
POTASSIUM SERPL-SCNC: 4.2 MMOL/L — SIGNIFICANT CHANGE UP (ref 3.5–5.3)
PROT SERPL-MCNC: 7.5 G/DL — SIGNIFICANT CHANGE UP (ref 6–8.3)
RBC # BLD: 5.08 M/UL — SIGNIFICANT CHANGE UP (ref 4.2–5.8)
RBC # FLD: 15 % — HIGH (ref 10.3–14.5)
SODIUM SERPL-SCNC: 138 MMOL/L — SIGNIFICANT CHANGE UP (ref 135–145)
T3FREE SERPL-MCNC: 3.25 PG/ML — SIGNIFICANT CHANGE UP (ref 2–4.4)
T4 FREE SERPL-MCNC: 1.2 NG/DL — SIGNIFICANT CHANGE UP (ref 0.9–1.8)
WBC # BLD: 6.45 K/UL — SIGNIFICANT CHANGE UP (ref 3.8–10.5)
WBC # FLD AUTO: 6.45 K/UL — SIGNIFICANT CHANGE UP (ref 3.8–10.5)

## 2024-11-14 PROCEDURE — 99232 SBSQ HOSP IP/OBS MODERATE 35: CPT

## 2024-11-14 RX ORDER — SODIUM CHLORIDE 9 MG/ML
500 INJECTION, SOLUTION INTRAMUSCULAR; INTRAVENOUS; SUBCUTANEOUS
Refills: 0 | Status: DISCONTINUED | OUTPATIENT
Start: 2024-11-14 | End: 2024-11-15

## 2024-11-14 RX ORDER — PRAMIPEXOLE 1.5 MG/1
0.5 TABLET, EXTENDED RELEASE ORAL THREE TIMES A DAY
Refills: 0 | Status: DISCONTINUED | OUTPATIENT
Start: 2024-11-14 | End: 2024-11-18

## 2024-11-14 RX ORDER — CLONAZEPAM 0.12 MG/1
0.25 TABLET, ORALLY DISINTEGRATING ORAL AT BEDTIME
Refills: 0 | Status: DISCONTINUED | OUTPATIENT
Start: 2024-11-14 | End: 2024-11-18

## 2024-11-14 RX ADMIN — SODIUM CHLORIDE 100 MILLILITER(S): 9 INJECTION, SOLUTION INTRAMUSCULAR; INTRAVENOUS; SUBCUTANEOUS at 15:39

## 2024-11-14 RX ADMIN — Medication 1 APPLICATION(S): at 06:48

## 2024-11-14 RX ADMIN — PRAMIPEXOLE 0.5 MILLIGRAM(S): 1.5 TABLET, EXTENDED RELEASE ORAL at 22:34

## 2024-11-14 RX ADMIN — Medication 1 APPLICATION(S): at 18:47

## 2024-11-14 RX ADMIN — TAMSULOSIN HYDROCHLORIDE 0.4 MILLIGRAM(S): 0.4 CAPSULE ORAL at 21:05

## 2024-11-14 RX ADMIN — CARBIDOPA AND LEVODOPA 1 TABLET(S): 10; 100 TABLET ORAL at 23:52

## 2024-11-14 RX ADMIN — CARBIDOPA AND LEVODOPA 1 TABLET(S): 10; 100 TABLET ORAL at 11:09

## 2024-11-14 RX ADMIN — Medication 500 MILLIGRAM(S): at 21:05

## 2024-11-14 RX ADMIN — Medication 81 MILLIGRAM(S): at 21:05

## 2024-11-14 RX ADMIN — CARBIDOPA AND LEVODOPA 1 TABLET(S): 10; 100 TABLET ORAL at 15:25

## 2024-11-14 RX ADMIN — METOPROLOL TARTRATE 100 MILLIGRAM(S): 100 TABLET, FILM COATED ORAL at 06:50

## 2024-11-14 RX ADMIN — ENTACAPONE 200 MILLIGRAM(S): 200 TABLET, FILM COATED ORAL at 15:25

## 2024-11-14 RX ADMIN — CARBIDOPA AND LEVODOPA 1 TABLET(S): 10; 100 TABLET ORAL at 06:50

## 2024-11-14 RX ADMIN — PRAMIPEXOLE 0.75 MILLIGRAM(S): 1.5 TABLET, EXTENDED RELEASE ORAL at 06:50

## 2024-11-14 RX ADMIN — ENTACAPONE 200 MILLIGRAM(S): 200 TABLET, FILM COATED ORAL at 06:50

## 2024-11-14 RX ADMIN — ENOXAPARIN SODIUM 40 MILLIGRAM(S): 30 INJECTION SUBCUTANEOUS at 06:48

## 2024-11-14 RX ADMIN — DONEPEZIL HYDROCHLORIDE 10 MILLIGRAM(S): 5 TABLET, FILM COATED ORAL at 21:05

## 2024-11-14 RX ADMIN — PRAMIPEXOLE 0.5 MILLIGRAM(S): 1.5 TABLET, EXTENDED RELEASE ORAL at 15:24

## 2024-11-14 RX ADMIN — ENTACAPONE 200 MILLIGRAM(S): 200 TABLET, FILM COATED ORAL at 18:47

## 2024-11-14 RX ADMIN — CLONAZEPAM 0.25 MILLIGRAM(S): 0.12 TABLET, ORALLY DISINTEGRATING ORAL at 22:34

## 2024-11-14 RX ADMIN — CARBIDOPA AND LEVODOPA 1 TABLET(S): 10; 100 TABLET ORAL at 18:47

## 2024-11-14 RX ADMIN — ROSUVASTATIN CALCIUM 10 MILLIGRAM(S): 5 TABLET, FILM COATED ORAL at 22:34

## 2024-11-14 RX ADMIN — ENTACAPONE 200 MILLIGRAM(S): 200 TABLET, FILM COATED ORAL at 11:09

## 2024-11-14 NOTE — PROGRESS NOTE ADULT - SUBJECTIVE AND OBJECTIVE BOX
SUBJECTIVE/ROS: Patient seen at bedside. He slept better last night and had less hallucinations today. He is tolerating down titration of mirapex. He was also noted to have TOYA today and to received NS bolus. He denies chest pain, fever, chills, nausea, vomiting, abdominal pain, headache, or BLE pain.       HPI:  74 year old male with PMH of HTN, HLD, CAD s/p PCI/CABG x 2 (last 8 years ago), BPH, Parkinson's Disease (dx 8 years ago, follows with Dr. Claudio) who presented to [facility] on 11/6 from home for frequent falls and hallucinations. He has history of occasional falls with increased frequency over the last 1-2 weeks (2-3 times this week) attributed to gait instability due to stiffness and feeling "frozen". Due to these falls, he has been more immobile and over last weekend, family noted increased LE edema for which he takes PRN lasix. Patient also endorses increase hallucination episodes (e.g. snakes in the house, having boots on when he didn't). In the ED, he was afebrile and hemodynamically stable. CTH is negative for acute intracranial pathology. LE duplex negative for DVT. He was evaluated by PT, OT, and PMR and  recommended for inpatient acute rehab. Neurology consulted for medication optimization for Parkinson's disease. Pt given lasix for b/l LE edema. He was admitted to [facility] On 11/8/24.       Vital Signs Last 24 Hrs  T(C): 36.3 (14 Nov 2024 07:28), Max: 36.4 (13 Nov 2024 21:11)  T(F): 97.3 (14 Nov 2024 07:28), Max: 97.6 (13 Nov 2024 21:11)  HR: --  BP: --  BP(mean): --  RR: 16 (14 Nov 2024 07:28) (16 - 16)  SpO2: 97% (14 Nov 2024 07:28) (97% - 98%)    Parameters below as of 14 Nov 2024 07:28  Patient On (Oxygen Delivery Method): room air        PHYSICAL EXAM  Constitutional - NAD, Comfortable  HEENT - NCAT, EOMI  Neck - Supple, No limited ROM  Chest - Breathing comfortably in room air   Extremities - Mild bilateral LE edema, no calf tenderness   Neurologic Exam - sitting comfortably in the chair, no dyskinesias at time of visit, no tremor. Mild hypophonia. Mild rigidity. Moderate bradykinesia worse on the R.       LABS:                          14.3   6.45  )-----------( 141      ( 14 Nov 2024 06:18 )             42.3     11-14    138  |  102  |  31[H]  ----------------------------<  93  4.2   |  27  |  1.40[H]    Ca    9.4      14 Nov 2024 06:18    TPro  7.5  /  Alb  3.9  /  TBili  1.4[H]  /  DBili  x   /  AST  16  /  ALT  12  /  AlkPhos  81  11-14    LIVER FUNCTIONS - ( 14 Nov 2024 06:18 )  Alb: 3.9 g/dL / Pro: 7.5 g/dL / ALK PHOS: 81 U/L / ALT: 12 U/L / AST: 16 U/L / GGT: x               MEDICATIONS  (STANDING):  aspirin enteric coated 81 milliGRAM(s) Oral at bedtime  bacitracin   Ointment 1 Application(s) Topical two times a day  carbidopa/levodopa  25/250 1 Tablet(s) Oral <User Schedule>  carbidopa/levodopa CR 25/100 1 Tablet(s) Oral <User Schedule>  clonazePAM Oral Disintegrating Tablet 0.125 milliGRAM(s) Oral at bedtime  donepezil 10 milliGRAM(s) Oral at bedtime  enoxaparin Injectable 40 milliGRAM(s) SubCutaneous every 24 hours  entacapone 200 milliGRAM(s) Oral <User Schedule>  metoprolol succinate  milliGRAM(s) Oral daily  niacin  milliGRAM(s) Oral at bedtime  pramipexole 0.5 milliGRAM(s) Oral three times a day  rosuvastatin 10 milliGRAM(s) Oral at bedtime  senna 2 Tablet(s) Oral at bedtime  sodium chloride 0.9%. 500 milliLiter(s) (100 mL/Hr) IV Continuous <Continuous>  tamsulosin 0.4 milliGRAM(s) Oral at bedtime    MEDICATIONS  (PRN):  acetaminophen     Tablet .. 650 milliGRAM(s) Oral every 6 hours PRN Mild Pain (1 - 3)  melatonin 3 milliGRAM(s) Oral at bedtime PRN Insomnia

## 2024-11-14 NOTE — PROGRESS NOTE ADULT - SUBJECTIVE AND OBJECTIVE BOX
Medicine Progress Note    Patient is a 74y old  Male who presents with a chief complaint of Parkinson's (10 Nov 2024 10:13)      SUBJECTIVE / OVERNIGHT EVENTS:  No acute events overnight. No new complaints. Patient seen and examined at bedside.     MEDICATIONS  (STANDING):  aspirin enteric coated 81 milliGRAM(s) Oral at bedtime  bacitracin   Ointment 1 Application(s) Topical two times a day  carbidopa/levodopa  25/250 1 Tablet(s) Oral <User Schedule>  carbidopa/levodopa CR 25/100 1 Tablet(s) Oral <User Schedule>  clonazePAM Oral Disintegrating Tablet 0.125 milliGRAM(s) Oral at bedtime  donepezil 10 milliGRAM(s) Oral at bedtime  enoxaparin Injectable 40 milliGRAM(s) SubCutaneous every 24 hours  entacapone 200 milliGRAM(s) Oral <User Schedule>  metoprolol succinate  milliGRAM(s) Oral daily  niacin  milliGRAM(s) Oral at bedtime  pramipexole 0.5 milliGRAM(s) Oral three times a day  rosuvastatin 10 milliGRAM(s) Oral at bedtime  senna 2 Tablet(s) Oral at bedtime  tamsulosin 0.4 milliGRAM(s) Oral at bedtime    MEDICATIONS  (PRN):  acetaminophen     Tablet .. 650 milliGRAM(s) Oral every 6 hours PRN Mild Pain (1 - 3)  melatonin 3 milliGRAM(s) Oral at bedtime PRN Insomnia    T(C): 36.3 (11-14-24 @ 07:28), Max: 36.4 (11-13-24 @ 21:11)  T(F): 97.3 (11-14-24 @ 07:28), Max: 97.6 (11-13-24 @ 21:11)  HR: --  BP: --  ABP: --  ABP(mean): --  RR: 16 (11-14-24 @ 07:28) (16 - 16)  SpO2: 97% (11-14-24 @ 07:28) (97% - 98%)    GENERAL: Not in distress. Alert    HEENT: clear conjuctiva, MMM. no pallor or icterus  CARDIOVASCULAR: RRR S1, S2. No murmur/rubs/gallop  LUNGS: BLAE+, no rales, no wheezing, no rhonchi.    ABDOMEN: ND. Soft,  NT, no guarding / rebound / rigidity. BS normoactive  BACK: No spine tenderness.  EXTREMITIES: no edema. no leg or calf TP. LLE open blister with some oozing od edema fluid. does not look infected.   SKIN: warm and dry  PSYCHIATRIC: Calm.  No agitation.  CNS: AAO. moves limbs, follows commands. rigidity, bradykinesia, hypophonia    LABS:                        14.3   6.45  )-----------( 141      ( 14 Nov 2024 06:18 )             42.3     11-14    138  |  102  |  31[H]  ----------------------------<  93  4.2   |  27  |  1.40[H]    Ca    9.4      14 Nov 2024 06:18    TPro  7.5  /  Alb  3.9  /  TBili  1.4[H]  /  DBili  x   /  AST  16  /  ALT  12  /  AlkPhos  81  11-14      Urinalysis Basic - ( 14 Nov 2024 06:18 )    Color: x / Appearance: x / SG: x / pH: x  Gluc: 93 mg/dL / Ketone: x  / Bili: x / Urobili: x   Blood: x / Protein: x / Nitrite: x   Leuk Esterase: x / RBC: x / WBC x   Sq Epi: x / Non Sq Epi: x / Bacteria: x       CAPILLARY BLOOD GLUCOSE            Urinalysis Basic - ( 14 Nov 2024 06:18 )    Color: x / Appearance: x / SG: x / pH: x  Gluc: 93 mg/dL / Ketone: x  / Bili: x / Urobili: x   Blood: x / Protein: x / Nitrite: x   Leuk Esterase: x / RBC: x / WBC x   Sq Epi: x / Non Sq Epi: x / Bacteria: x        RADIOLOGY & ADDITIONAL TESTS:    Consultant(s) Notes Reviewed:  [x ] YES  [ ] NO  Care Discussed with Consultants/Other Providers [ x] YES  [ ] NO  Imaging Personally Reviewed:  [x ] YES  [ ] NO  COORDINATION OF CARE:  Care Discussed with Consultants/Other Providers:

## 2024-11-14 NOTE — PROGRESS NOTE ADULT - ASSESSMENT
74 year old male with PMH of HTN, HLD, CAD s/p PCI/CABG x 2, BPH, Parkinson's Disease who presented to [facility] on 11/6 from home for frequent falls and hallucinations. Falls have been increasing in frequency, now occurring 2-3x a week attributed to gait instability due to stiffness and feeling "frozen". The falls have led him to become more immobile leading to worsening LE swelling. Patient also endorses increase hallucination episodes (e.g. snakes in the house). Medical workup significant for TOYA, negative for infection and acute intracranial pathology. S/P lasix for b/l LE edema. He was subsequently referred to [facility] for acute inpatient rehab On 11/8/24.   TOYA  - will give bolus 500 cc, monitor renal function  - avoid nephrotoxins.    #Parkinson's Disease  #Rigidity/Bradykinesia/falls  -Comprehensive Rehab Program  -Continue Sinemet 25/250 mg 1 tab at 7am, 11am, 3pm, and 7pm with dose of entacapone 200mg at same timing  -Continue Sinemet 25/100mg CR 1 tab at 12am   -Mirapex 1mg TID  -aricept 10mg daily   -melatonin PRN   -further medication changes per movement disorder specialist.   -fall, aspiration precautions      #Orthostatic hypotension  -Currently on metoprolol and flomax - monitor for continued need  -Monitor OH vital signs  #HTN  #CAD - s/p remote CABG and PCI  # HLD  -Continue metoprolol 100mg ER   -Niacin 500mg daily   -Crestor 10mg daily   -Continue aspirin 81mg daily   -monitor vitals    #BL lower extremity edema  - B/L lower extremity venous doppler (11/04/2024): negative for DVT  - s/p lasix 40mg po x 3 days as inpatient - subsequent TOYA, would avoid further use for now and use conservative management  - elevate legs daily  - TTE: EF 50-55%. cacified AS, AR. Bi Atrial enlargement  - monitor    # LLE blister/wound  - blister has been broken. now oozing edema fluid. no sign of infection. does not look like pressure injury  - wound care per primary  - offload pressure  - avoid rubbing  - elevate legs    # Abnormal TSH  - TSh mildly elevated  - no symptoms  - repeat TFT in 4-6 weeks    # mild hyponatremia  - sodium 134  - monitor      #Constipation  - Continue Senna at bedtime   - Miralax daily    #BPH  -monitor PVR q 8 hours (SC if > 400)  -Monitor UO  -Continue flomax    #DVT Prophylaxis  - Lovenox    d/w rehab team

## 2024-11-14 NOTE — PROGRESS NOTE ADULT - ASSESSMENT
74 year old male with PMH of HTN, HLD, CAD s/p PCI/CABG x 2 (last 8 years ago), BPH, Parkinson's Disease (dx 8 years ago, follows with Dr. Claudio) who presented to [facility] on 11/6 from home for frequent falls and hallucinations. He has history of occasional falls with increased frequency over the last 1-2 weeks (2-3 times this week) attributed to gait instability due to stiffness and feeling "frozen". Due to these falls, he has been more immobile and over last weekend, family noted increased LE edema for which he takes PRN lasix. Patient also endorses increase hallucination episodes (e.g. snakes in the house, having boots on when he didn't). In the ED, he was afebrile and hemodynamically stable. CTH is negative for acute intracranial pathology. LE duplex negative for DVT. He was evaluated by PT, OT, and PMR and  recommended for inpatient acute rehab. Neurology consulted for medication optimization for Parkinson's disease. Pt given lasix for b/l LE edema. He was admitted to [facility] On 11/8/24.     #Parkinson's Disease now with gait Instability, ADL impairments and Functional impairments  - Continue Comprehensive Rehab Program of PT/OT/SLP    ------------------------------------------------------  Parkinson's related motor symptoms    #Rigidity/Bradykinesia/falls   -Continue Sinemet 25/250 mg 1 tab at 7am, 11am, 3pm, and 7pm with dose of entacapone 200 mg at same timing - to monitor symptoms while titrating down pramipexole, potentially to consider more frequent lower dosing and/or opicapone   -Continue Sinemet 25/100mg CR 1 tab at 12am   -Mirapex reduce to 0.50 mg TID x 3 days then 0.25 mg in 2-3 days, then 0.125 mg TID to BID to daily, then stop    -Follows with Dr. Veras  -Movement disorders following    --------------------------------------------------------  Parkinson's related non-motor symptoms    #Mood/memory/sleep  -Continue aricept 10mg daily   -Continue melatonin PRN   -Increase clonazepam to 0.25mg at bedtime 11/13  -Consider nuplazid for mild hallucinations   -Neuropsych to see   -Rec therapy     #Orthostatic hypotension  -Monitor OH vital signs  -Currently on metoprolol and flomax - monitor for continued need   - no significant orthostasis for now     #Pain control  - Tylenol PRN    #GI/Bowel Management/Constipation  - Continue Senna at bedtime   - Miralax daily    #Bladder management/BPH  - Continue to monitor PVR q 8 hours (SC if > 400)  - Monitor UO  - Continue flomax - monitor OH    ----------------------------------------------------------  Concurrent medical problems    #TOYA  -cr 1.4  -NS bolus 500mL x1  -Repeat in am     #Mildly elevated TSH   - T3, T4 added -pending  -TSH 4.05    #HTN  #CAD - s/p CABG and PCI in the past  -Continue metoprolol 100mg ER - monitor vitals  -EKG  -Niacin 500mg daily   -Crestor 10mg daily   -Continue aspirin 81mg daily     #bilateral lower extremity edema -with blister  - B/L lower extremity venous doppler (11/04/2024): negative for DVT  - s/p lasix 40mg po x 3 days as inpatient- monitor for need   - elevate legs daily  - ACE wrap for compression if neede  - Echo TTE (11/10):  moderate calcific aortic stenosis with mild aortic insufficiency, biatrial enlargement, left ventricular ejection fraction, by visual estimation, 55 to 60%, normal global left ventricular systolic function, moderate mitral annular calcification, trace mitral valve regurgitation, dilatation of the aortic root, Dimesionless Index value is .49.    #DVT Prophylaxis  - Lovenox  - TEDs   - LE duplex 11/4 - negative for DVT    #Skin:  -Left leg blister - elevate leg and apply foam dressing if oozing noted  -Offset pressure.     FEN   - Diet - DASH diet  - Dysphagia  SLP - evaluation and treatment    Precautions / PROPHYLAXIS:   - Falls, Cardiac  - ortho: Weight bearing status: WBAT   - Lungs: Aspiration  - Pressure injury/Skin:  OOB to Chair, PT/OT      Danilo Javier  Pulmonary Disease  [address] [place] [id number]  Phone: [phone]  Fax: [phone]  Follow Up Time: 2 weeks

## 2024-11-15 LAB
ALBUMIN SERPL ELPH-MCNC: 3.8 G/DL — SIGNIFICANT CHANGE UP (ref 3.3–5)
ALP SERPL-CCNC: 82 U/L — SIGNIFICANT CHANGE UP (ref 40–120)
ALT FLD-CCNC: 11 U/L — SIGNIFICANT CHANGE UP (ref 10–45)
ANION GAP SERPL CALC-SCNC: 10 MMOL/L — SIGNIFICANT CHANGE UP (ref 5–17)
AST SERPL-CCNC: 15 U/L — SIGNIFICANT CHANGE UP (ref 10–40)
BILIRUB SERPL-MCNC: 0.9 MG/DL — SIGNIFICANT CHANGE UP (ref 0.2–1.2)
BUN SERPL-MCNC: 30 MG/DL — HIGH (ref 7–23)
CALCIUM SERPL-MCNC: 9.4 MG/DL — SIGNIFICANT CHANGE UP (ref 8.4–10.5)
CHLORIDE SERPL-SCNC: 101 MMOL/L — SIGNIFICANT CHANGE UP (ref 96–108)
CO2 SERPL-SCNC: 26 MMOL/L — SIGNIFICANT CHANGE UP (ref 22–31)
CREAT SERPL-MCNC: 1.44 MG/DL — HIGH (ref 0.5–1.3)
EGFR: 50 ML/MIN/1.73M2 — LOW
GLUCOSE SERPL-MCNC: 160 MG/DL — HIGH (ref 70–99)
POTASSIUM SERPL-MCNC: 3.9 MMOL/L — SIGNIFICANT CHANGE UP (ref 3.5–5.3)
POTASSIUM SERPL-SCNC: 3.9 MMOL/L — SIGNIFICANT CHANGE UP (ref 3.5–5.3)
PROT SERPL-MCNC: 7.5 G/DL — SIGNIFICANT CHANGE UP (ref 6–8.3)
SODIUM SERPL-SCNC: 137 MMOL/L — SIGNIFICANT CHANGE UP (ref 135–145)

## 2024-11-15 PROCEDURE — 99232 SBSQ HOSP IP/OBS MODERATE 35: CPT

## 2024-11-15 PROCEDURE — 96116 NUBHVL XM PHYS/QHP 1ST HR: CPT

## 2024-11-15 RX ORDER — SODIUM CHLORIDE 9 MG/ML
1000 INJECTION, SOLUTION INTRAMUSCULAR; INTRAVENOUS; SUBCUTANEOUS
Refills: 0 | Status: DISCONTINUED | OUTPATIENT
Start: 2024-11-15 | End: 2024-11-16

## 2024-11-15 RX ADMIN — ENTACAPONE 200 MILLIGRAM(S): 200 TABLET, FILM COATED ORAL at 19:16

## 2024-11-15 RX ADMIN — Medication 81 MILLIGRAM(S): at 21:06

## 2024-11-15 RX ADMIN — Medication 500 MILLIGRAM(S): at 21:06

## 2024-11-15 RX ADMIN — CARBIDOPA AND LEVODOPA 1 TABLET(S): 10; 100 TABLET ORAL at 19:16

## 2024-11-15 RX ADMIN — DONEPEZIL HYDROCHLORIDE 10 MILLIGRAM(S): 5 TABLET, FILM COATED ORAL at 21:06

## 2024-11-15 RX ADMIN — CLONAZEPAM 0.25 MILLIGRAM(S): 0.12 TABLET, ORALLY DISINTEGRATING ORAL at 21:05

## 2024-11-15 RX ADMIN — PRAMIPEXOLE 0.5 MILLIGRAM(S): 1.5 TABLET, EXTENDED RELEASE ORAL at 14:57

## 2024-11-15 RX ADMIN — ROSUVASTATIN CALCIUM 10 MILLIGRAM(S): 5 TABLET, FILM COATED ORAL at 21:03

## 2024-11-15 RX ADMIN — Medication 1 APPLICATION(S): at 19:16

## 2024-11-15 RX ADMIN — ENTACAPONE 200 MILLIGRAM(S): 200 TABLET, FILM COATED ORAL at 06:50

## 2024-11-15 RX ADMIN — ENOXAPARIN SODIUM 40 MILLIGRAM(S): 30 INJECTION SUBCUTANEOUS at 06:05

## 2024-11-15 RX ADMIN — PRAMIPEXOLE 0.5 MILLIGRAM(S): 1.5 TABLET, EXTENDED RELEASE ORAL at 21:03

## 2024-11-15 RX ADMIN — CARBIDOPA AND LEVODOPA 1 TABLET(S): 10; 100 TABLET ORAL at 11:15

## 2024-11-15 RX ADMIN — ENTACAPONE 200 MILLIGRAM(S): 200 TABLET, FILM COATED ORAL at 14:57

## 2024-11-15 RX ADMIN — ENTACAPONE 200 MILLIGRAM(S): 200 TABLET, FILM COATED ORAL at 11:15

## 2024-11-15 RX ADMIN — CARBIDOPA AND LEVODOPA 1 TABLET(S): 10; 100 TABLET ORAL at 14:57

## 2024-11-15 RX ADMIN — SODIUM CHLORIDE 75 MILLILITER(S): 9 INJECTION, SOLUTION INTRAMUSCULAR; INTRAVENOUS; SUBCUTANEOUS at 12:54

## 2024-11-15 RX ADMIN — PRAMIPEXOLE 0.5 MILLIGRAM(S): 1.5 TABLET, EXTENDED RELEASE ORAL at 06:04

## 2024-11-15 RX ADMIN — CARBIDOPA AND LEVODOPA 1 TABLET(S): 10; 100 TABLET ORAL at 06:49

## 2024-11-15 RX ADMIN — TAMSULOSIN HYDROCHLORIDE 0.4 MILLIGRAM(S): 0.4 CAPSULE ORAL at 21:03

## 2024-11-15 RX ADMIN — Medication 1 APPLICATION(S): at 06:05

## 2024-11-15 RX ADMIN — CARBIDOPA AND LEVODOPA 1 TABLET(S): 10; 100 TABLET ORAL at 23:49

## 2024-11-15 RX ADMIN — METOPROLOL TARTRATE 100 MILLIGRAM(S): 100 TABLET, FILM COATED ORAL at 06:04

## 2024-11-15 NOTE — CONSULT NOTE ADULT - ASSESSMENT
74 year old male with PMH of HTN, HLD, CAD s/p PCI/CABG x 2, BPH, Parkinson's Disease who presented to [facility] on 11/6 from home for frequent falls and hallucinations. Falls have been increasing in frequency, now occurring 2-3x a week attributed to gait instability due to stiffness and feeling "frozen". The falls have led him to become more immobile leading to worsening LE swelling. Patient also endorses increase hallucination episodes (e.g. snakes in the house). Medical workup significant for TOYA, negative for infection and acute intracranial pathology. S/P lasix for b/l LE edema. He was subsequently referred to [facility] for acute inpatient rehab On 11/8/24.   #Parkinson's Disease  #Rigidity/Bradykinesia/falls  -Comprehensive Rehab Program  -Continue Sinemet 25/250 mg 1 tab at 7am, 11am, 3pm, and 7pm with dose of entacapone 200mg at same timing  -Continue Sinemet 25/100mg CR 1 tab at 12am   -Mirapex 1mg TID  -aricept 10mg daily   -melatonin PRN   -further medication changes per Neuropsych  -fall precautions  -presents with falls and LE swelling with no recent echocardiogram in chart. Will order routine TTE  -f/u TSH  -EKG reviewed, sinus bradycardia. monitor HR    #Orthostatic hypotension  -most recent orthostatics negative. however not completed while standing. negative on 11/5  -Monitor OH vital signs  -Currently on metoprolol and flomax - monitor for continued need    #Constipation  - Continue Senna at bedtime   - Miralax daily    #BPH  -monitor PVR q 8 hours (SC if > 400)  -Monitor UO  -Continue flomax    #HTN  #CAD - s/p remote CABG and PCI  -Continue metoprolol 100mg ER -monitor vitals  -Niacin 500mg daily   -Crestor 10mg daily   -Continue aspirin 81mg daily     #BL lower extremity edema -with blister  - B/L lower extremity venous doppler (11/04/2024): negative for DVT  - s/p lasix 40mg po x 3 days as inpatient - subsequent TOYA, would avoid further use for now and use conservative management  - elevate legs daily  - ACE wrap for compression if needed  -routine TTE    #Skin:  -Left leg blister - elevate leg and wrap with dry gauze if oozing - not currently oozing   -Offset pressure.     #DVT Prophylaxis  - Lovenox
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 Pt exhibits difficulties with concentration/working memory, language (including fluency and writing), visuospatial skills (including visuomotor integration, figure-ground discrimination, and visual memory), and aspects of executive functions (including organizational skills, initiation, problem solving, and to a lesser extent, abstract reasoning). Pt's affect is depressed and he reports mild to moderate levels of anxiety and depression in response to his current medical condition. He reports that poor sleep is the symptom that bothers him the most at this point. FIM scores: Social Interaction 4; Problem Solving 5; Memory 6.    Plan: Individual supportive psychotherapy to monitor cognition, affect/mood, and behavior. Continue with his current psychotropic medications regimen. Cognitive remediation during speech therapy and occupational therapy sessions is recommended. Participation in recreation/art therapy in order to have pleasure and mastery experiences and regain/reestablish a sense of routine.    Time spent with Pt, 45  minutes.

## 2024-11-15 NOTE — CONSULT NOTE ADULT - SUBJECTIVE AND OBJECTIVE BOX
Pt is  74 year-old, left-handed male with PMHx of HTN, HLD, CAD s/p PCI/CABG x 2 (last 8 years ago), BPH, Parkinson's Disease (dx 8 years ago, follows with Dr. Claudio) who presented to [facility] on 11/6 from home for frequent falls and hallucinations. He has history of occasional falls with increased frequency over the last 1-2 weeks (2-3 times this week) attributed to gait instability due to stiffness and feeling "frozen". Due to these falls, he has been more immobile and over last weekend, family noted increased LE edema for which he takes PRN lasix. Pt also endorses increase hallucination episodes (e.g. snakes in the house, having boots on when he didn't). In the ED, he was afebrile and hemodynamically stable. CTH is negative for acute intracranial pathology. LE duplex negative for DVT. He was evaluated by PT, OT, and PMR and  recommended for inpatient acute rehab. Neurology consulted for medication optimization for Parkinson's disease. Pt given lasix for b/l LE edema. He was admitted to [facility] On 11/8/24. PMHx:  As reported above. Current meds: Klonopin Wafer 0.25 mg HS, Aricept 10 mg HS. Please see list in Pt’s chart. Social Hx: Pt is  and has two adult sons. Pt graduated from college with a degree in business, and is a retired . Pt lacks hx of mental illness; he quit smoking (up to 2 PPD) in the 1990s, and drinks a vodka cocktail occasionally. Pt is [employer]. He enjoys playing [employer].    Findings: Pt was seen for an initial assessment of his cognitive and emotional functioning. The Modified MMSE (3MS) was administered; Pt’s results (90/100) were in the Normal  range. His scores in standardized geriatric mood measures suggested moderate to severe levels of anxiety and moderate levels of depression (Geriatric Anxiety Scale, GAS = 10/30; Geriatric Depression Scale, GDS = 6/15). Pt was alert, fully Ox3, and his attitude was cooperative and friendly. Attn/Conc: Simple auditory attention - impaired.  Concentration/Working memory for reversed counting and spelling – mildly impaired (5/7, mild difficulty with reversed spelling). Language: Pt’s speech was of slightly hypophonic, with normal pitch and pace . Naming - intact. Sentence repetition - intact. Auditory Comprehension - intact. Reading - intact. Writing - mildly impaired (4/5 words correctly written and legible). Memory: Encoding of 3 words was intact (3/3, after 2 learning trials); short- and long-delayed verbal recall – both intact (3/3). LTM was intact for US presidents (4/4). Visual memory – impaired. Visuospatial: Visuomotor integration – moderately impaired for copy of a 2D figure (7/10), simplification of the original stimulus was noted. Figure-ground discrimination was impaired  (6/4) for the Poppelreuter figure. Executive Functions: Motor Planning - intact. Organizational skills – moderately impaired for clock drawing on command; surrounding [place] was missing and clock hands pointed to numbers 11 & 10. Abstract reasoning – mildly impaired for similarities (5/6). Initiation/Phonemic Fluency – moderately impaired (6/10 four-legged animals in 30 sec). Verbal problem solving – mildly impaired for three hypothetical scenarios. Emotional functioning: Affect - depressed. Mood - depressed; Pt reported experiencing depressed mood, poor sleep and fatigue. Pt reported experiencing VH seeing sakes or things flying on air, 1-2 per week On mood measures he additionally reported anhedonia, boredom, catastrophization, feelings of isolation, low self-esteem, low energy, irritability, feelings of unreality, worry, restlessness, fatigue, tension, and perceived lack of control over his life.  Thought processes were goal-directed.  No abnormal thought contents were observed.  Pt denied any AH/VH during the course of the interview, but admitted to experiencing VH at home. Pt also denied SI/HI/I/P. Insight - fair. Judgment - fair.    Pt's perceived psychosocial impact of his/her symptoms of Parkinson's disease during the past month (SCOPA-PS) indicates that s/he experiences: .  On a measure of the impact of disability on participation in the different life tasks (WHODAS 2.0 12-item version) Pt's endorsements indicate: .    Assessment:  FIM scores: Social Interaction ; Problem Solving ; Memory .  Plan: Individual supportive psychotherapy to monitor cognition, affect/mood, and behavior. Cognitive remediation during speech therapy sessions. Participation in recreation/art therapy in order to have pleasure and mastery experiences and regain/reestablish a sense of routine.  Time spent with Pt,  minutes.   Pt is  74 year-old, left-handed male with PMHx of HTN, HLD, CAD s/p PCI/CABG x 2 (last 8 years ago), BPH, Parkinson's Disease (dx 8 years ago, follows with Dr. Claudio) who presented to [facility] on 11/6 from home for frequent falls and hallucinations. He has history of occasional falls with increased frequency over the last 1-2 weeks (2-3 times this week) attributed to gait instability due to stiffness and feeling "frozen". Due to these falls, he has been more immobile and over last weekend, family noted increased LE edema for which he takes PRN lasix. Pt also endorses increase hallucination episodes (e.g. snakes in the house, having boots on when he didn't). In the ED, he was afebrile and hemodynamically stable. CTH is negative for acute intracranial pathology. LE duplex negative for DVT. He was evaluated by PT, OT, and PMR and  recommended for inpatient acute rehab. Neurology consulted for medication optimization for Parkinson's disease. Pt given lasix for b/l LE edema. He was admitted to [facility] On 11/8/24. PMHx:  As reported above. Current meds: Klonopin Wafer 0.25 mg HS, Aricept 10 mg HS. Please see list in Pt’s chart. Social Hx: Pt is  and has two adult sons. Pt graduated from college with a degree in business, and is a retired . Pt lacks hx of mental illness; he quit smoking (up to 2 PPD) in the 1990s, and drinks a vodka cocktail occasionally. Pt is Judaism. He enjoys playing [employer].    Findings: Pt was seen for an initial assessment of his cognitive and emotional functioning. The Modified MMSE (3MS) was administered; Pt’s results (90/100) were in the Normal  range. His scores in standardized geriatric mood measures suggested moderate to severe levels of anxiety and moderate levels of depression (Geriatric Anxiety Scale, GAS = 10/30; Geriatric Depression Scale, GDS = 6/15). Pt was alert, fully Ox3, and his attitude was cooperative and friendly. Attn/Conc: Simple auditory attention - impaired.  Concentration/Working memory for reversed counting and spelling – mildly impaired (5/7, mild difficulty with reversed spelling). Language: Pt’s speech was of slightly hypophonic, with normal pitch and pace . Naming - intact. Sentence repetition - intact. Auditory Comprehension - intact. Reading - intact. Writing - mildly impaired (4/5 words correctly written and legible). Memory: Encoding of 3 words was intact (3/3, after 2 learning trials); short- and long-delayed verbal recall – both intact (3/3). LTM was intact for US presidents (4/4). Visual memory – impaired. Visuospatial: Visuomotor integration – moderately impaired for copy of a 2D figure (7/10), simplification of the original stimulus was noted. Figure-ground discrimination was impaired  (6/4) for the Poppelreuter figure. Executive Functions: Motor Planning - intact. Organizational skills – moderately impaired for clock drawing on command; surrounding [place] was missing and clock hands pointed to numbers 11 & 10. Abstract reasoning – mildly impaired for similarities (5/6). Initiation/Phonemic Fluency – moderately impaired (6/10 four-legged animals in 30 sec). Verbal problem solving – mildly impaired for three hypothetical scenarios. Emotional functioning: Affect - depressed. Mood - depressed; Pt reported experiencing depressed mood, poor sleep and fatigue. Pt reported experiencing VH seeing sakes or things flying on air, 1-2 per week On mood measures he additionally reported anhedonia, boredom, catastrophization, feelings of isolation, low self-esteem, low energy, irritability, feelings of unreality, worry, restlessness, fatigue, tension, and perceived lack of control over his life.  Thought processes were goal-directed.  No abnormal thought contents were observed.  Pt denied any AH/VH during the course of the interview, but admitted to experiencing VH at home. Pt also denied SI/HI/I/P. Insight - fair. Judgment - fair.      On a measure of the impact of disability on participation in the different life tasks (WHODAS 2.0 12-item version) Pt's endorsements indicate: overall moderate perceived disability (94th to 97th percentile). He reported moderate difficulties in performance of household chores, cognition, mobility, self-care and community participation, and mild difficulties in socialization.

## 2024-11-15 NOTE — PROGRESS NOTE ADULT - SUBJECTIVE AND OBJECTIVE BOX
SUBJECTIVE/ROS: Patient seen at bedside. He slept well last night. He states that his hallucinations have improved. HE has no new complaints and denies chest pain, fever, chills, nausea, vomiting, abdominal pain, headache, or BLE pain.       HPI:  74 year old male with PMH of HTN, HLD, CAD s/p PCI/CABG x 2 (last 8 years ago), BPH, Parkinson's Disease (dx 8 years ago, follows with Dr. Claudio) who presented to [facility] on 11/6 from home for frequent falls and hallucinations. He has history of occasional falls with increased frequency over the last 1-2 weeks (2-3 times this week) attributed to gait instability due to stiffness and feeling "frozen". Due to these falls, he has been more immobile and over last weekend, family noted increased LE edema for which he takes PRN lasix. Patient also endorses increase hallucination episodes (e.g. snakes in the house, having boots on when he didn't). In the ED, he was afebrile and hemodynamically stable. CTH is negative for acute intracranial pathology. LE duplex negative for DVT. He was evaluated by PT, OT, and PMR and  recommended for inpatient acute rehab. Neurology consulted for medication optimization for Parkinson's disease. Pt given lasix for b/l LE edema. He was admitted to [facility] On 11/8/24.       Vital Signs Last 24 Hrs  T(C): 37 (14 Nov 2024 19:54), Max: 37 (14 Nov 2024 19:54)  T(F): 98.6 (14 Nov 2024 19:54), Max: 98.6 (14 Nov 2024 19:54)  HR: 65 (15 Nov 2024 06:02) (65 - 66)  BP: 135/85 (15 Nov 2024 06:02) (115/75 - 135/85)  BP(mean): --  RR: 16 (15 Nov 2024 06:02) (16 - 16)  SpO2: 98% (15 Nov 2024 06:02) (98% - 98%)    Parameters below as of 15 Nov 2024 06:02  Patient On (Oxygen Delivery Method): room air          PHYSICAL EXAM  Constitutional - NAD, Comfortable  HEENT - NCAT, EOMI  Neck - Supple, No limited ROM  Chest - Breathing comfortably in room air   Extremities - Mild bilateral LE edema, no calf tenderness   Neurologic Exam - ao to self, place, year,  sitting comfortably in the chair, no dyskinesias at time of visit, no tremor. Mild hypophonia. Mild rigidity. Moderate bradykinesia worse on the R.       LABS:                          14.3   6.45  )-----------( 141      ( 14 Nov 2024 06:18 )             42.3     11-14    138  |  102  |  31[H]  ----------------------------<  93  4.2   |  27  |  1.40[H]    Ca    9.4      14 Nov 2024 06:18    TPro  7.5  /  Alb  3.9  /  TBili  1.4[H]  /  DBili  x   /  AST  16  /  ALT  12  /  AlkPhos  81  11-14    LIVER FUNCTIONS - ( 14 Nov 2024 06:18 )  Alb: 3.9 g/dL / Pro: 7.5 g/dL / ALK PHOS: 81 U/L / ALT: 12 U/L / AST: 16 U/L / GGT: x               MEDICATIONS  (STANDING):  aspirin enteric coated 81 milliGRAM(s) Oral at bedtime  bacitracin   Ointment 1 Application(s) Topical two times a day  carbidopa/levodopa  25/250 1 Tablet(s) Oral <User Schedule>  carbidopa/levodopa CR 25/100 1 Tablet(s) Oral <User Schedule>  clonazePAM Oral Disintegrating Tablet 0.25 milliGRAM(s) Oral at bedtime  donepezil 10 milliGRAM(s) Oral at bedtime  enoxaparin Injectable 40 milliGRAM(s) SubCutaneous every 24 hours  entacapone 200 milliGRAM(s) Oral <User Schedule>  metoprolol succinate  milliGRAM(s) Oral daily  niacin  milliGRAM(s) Oral at bedtime  pramipexole 0.5 milliGRAM(s) Oral three times a day  rosuvastatin 10 milliGRAM(s) Oral at bedtime  senna 2 Tablet(s) Oral at bedtime  sodium chloride 0.9%. 500 milliLiter(s) (100 mL/Hr) IV Continuous <Continuous>  tamsulosin 0.4 milliGRAM(s) Oral at bedtime    MEDICATIONS  (PRN):  acetaminophen     Tablet .. 650 milliGRAM(s) Oral every 6 hours PRN Mild Pain (1 - 3)  melatonin 3 milliGRAM(s) Oral at bedtime PRN Insomnia

## 2024-11-15 NOTE — PROGRESS NOTE ADULT - SUBJECTIVE AND OBJECTIVE BOX
Patient is a 74y old  Male who presents with a chief complaint of Parkinson's (15 Nov 2024 10:00)    Patient seen and examined at bedside. No acute overnight events. No complaints at time of evaluation.     ALLERGIES:  No Known Allergies    MEDICATIONS  (STANDING):  aspirin enteric coated 81 milliGRAM(s) Oral at bedtime  bacitracin   Ointment 1 Application(s) Topical two times a day  carbidopa/levodopa  25/250 1 Tablet(s) Oral <User Schedule>  carbidopa/levodopa CR 25/100 1 Tablet(s) Oral <User Schedule>  clonazePAM Oral Disintegrating Tablet 0.25 milliGRAM(s) Oral at bedtime  donepezil 10 milliGRAM(s) Oral at bedtime  enoxaparin Injectable 40 milliGRAM(s) SubCutaneous every 24 hours  entacapone 200 milliGRAM(s) Oral <User Schedule>  metoprolol succinate  milliGRAM(s) Oral daily  niacin  milliGRAM(s) Oral at bedtime  pramipexole 0.5 milliGRAM(s) Oral three times a day  rosuvastatin 10 milliGRAM(s) Oral at bedtime  senna 2 Tablet(s) Oral at bedtime  sodium chloride 0.9%. 500 milliLiter(s) (100 mL/Hr) IV Continuous <Continuous>  tamsulosin 0.4 milliGRAM(s) Oral at bedtime    MEDICATIONS  (PRN):  acetaminophen     Tablet .. 650 milliGRAM(s) Oral every 6 hours PRN Mild Pain (1 - 3)  melatonin 3 milliGRAM(s) Oral at bedtime PRN Insomnia    Vital Signs Last 24 Hrs  T(F): 98.6 (14 Nov 2024 19:54), Max: 98.6 (14 Nov 2024 19:54)  HR: 65 (15 Nov 2024 06:02) (65 - 66)  BP: 135/85 (15 Nov 2024 06:02) (115/75 - 135/85)  RR: 16 (15 Nov 2024 06:02) (16 - 16)  SpO2: 98% (15 Nov 2024 06:02) (98% - 98%)  I&O's Summary    PHYSICAL EXAM:  General: NAD, awake, alert, pleasant   ENT: MMM, no tonsilar exudate  Neck: Supple, No JVD  Lungs: Clear to auscultation bilaterally, no wheezes. Good air entry bilaterally   Cardio: RRR, S1/S2, No murmurs  Abdomen: Soft, Nontender, Nondistended; Bowel sounds present  Extremities: No calf tenderness, No pitting edema    LABS:                        14.3   6.45  )-----------( 141      ( 14 Nov 2024 06:18 )             42.3       11-15    137  |  101  |  30  ----------------------------<  160  3.9   |  26  |  1.44    Ca    9.4      15 Nov 2024 06:08    TPro  7.5  /  Alb  3.8  /  TBili  0.9  /  DBili  x   /  AST  15  /  ALT  11  /  AlkPhos  82  11-15     Urinalysis Basic - ( 15 Nov 2024 06:08 )    Color: x / Appearance: x / SG: x / pH: x  Gluc: 160 mg/dL / Ketone: x  / Bili: x / Urobili: x   Blood: x / Protein: x / Nitrite: x   Leuk Esterase: x / RBC: x / WBC x   Sq Epi: x / Non Sq Epi: x / Bacteria: x    COVID-19 PCR: NotDetec (11-08-24 @ 16:40)    RADIOLOGY & ADDITIONAL TESTS:     Care Discussed with Consultants/Other Providers:

## 2024-11-15 NOTE — PROGRESS NOTE ADULT - ASSESSMENT
74 year old male with PMH of HTN, HLD, CAD s/p PCI/CABG x 2, BPH, Parkinson's Disease who presented to [facility] on 11/6 from home for frequent falls and hallucinations. Falls have been increasing in frequency, now occurring 2-3x a week attributed to gait instability due to stiffness and feeling "frozen". The falls have led him to become more immobile leading to worsening LE swelling. Patient also endorses increase hallucination episodes (e.g. snakes in the house). Medical workup significant for TOYA, negative for infection and acute intracranial pathology. S/P lasix for b/l LE edema. He was subsequently referred to [facility] for acute inpatient rehab On 11/8/24.   TOYA  - IVF   - monitor renal function in am, if no improvement consider nephro eval   - monitor urine output    #Parkinson's Disease  #Rigidity/Bradykinesia/falls  -Comprehensive Rehab Program  -Continue Sinemet 25/250 mg 1 tab at 7am, 11am, 3pm, and 7pm with dose of entacapone 200mg at same timing  -Continue Sinemet 25/100mg CR 1 tab at 12am   -Mirapex 1mg TID  -aricept 10mg daily   -melatonin PRN   -further medication changes per movement disorder specialist.   -fall, aspiration precautions      #Orthostatic hypotension  -Currently on metoprolol and flomax - monitor for continued need  -Monitor OH vital signs  #HTN  #CAD - s/p remote CABG and PCI  # HLD  -Continue metoprolol 100mg ER   -Niacin 500mg daily   -Crestor 10mg daily   -Continue aspirin 81mg daily   -monitor vitals    #BL lower extremity edema  - B/L lower extremity venous doppler (11/04/2024): negative for DVT  - s/p lasix 40mg po x 3 days as inpatient - subsequent TOYA, would avoid further use for now and use conservative management  - elevate legs daily  - TTE: EF 50-55%. cacified AS, AR. Bi Atrial enlargement  - monitor    # Abnormal TSH  - TSh mildly elevated  - no symptoms  - repeat TFT in 4-6 weeks    # mild hyponatremia  - sodium 134  - monitor      #Constipation  - Continue Senna at bedtime   - Miralax daily    #BPH  -monitor PVR q 8 hours (SC if > 400)  -Monitor UO  -Continue flomax    #DVT Prophylaxis  - Lovenox    d/w rehab team

## 2024-11-15 NOTE — PROGRESS NOTE ADULT - ASSESSMENT
74 year old male with PMH of HTN, HLD, CAD s/p PCI/CABG x 2 (last 8 years ago), BPH, Parkinson's Disease (dx 8 years ago, follows with Dr. Claudio) who presented to [facility] on 11/6 from home for frequent falls and hallucinations. He has history of occasional falls with increased frequency over the last 1-2 weeks (2-3 times this week) attributed to gait instability due to stiffness and feeling "frozen". Due to these falls, he has been more immobile and over last weekend, family noted increased LE edema for which he takes PRN lasix. Patient also endorses increase hallucination episodes (e.g. snakes in the house, having boots on when he didn't). In the ED, he was afebrile and hemodynamically stable. CTH is negative for acute intracranial pathology. LE duplex negative for DVT. He was evaluated by PT, OT, and PMR and  recommended for inpatient acute rehab. Neurology consulted for medication optimization for Parkinson's disease. Pt given lasix for b/l LE edema. He was admitted to [facility] On 11/8/24.     #Parkinson's Disease now with gait Instability, ADL impairments and Functional impairments  - Continue Comprehensive Rehab Program of PT/OT/SLP    ------------------------------------------------------  Parkinson's related motor symptoms    #Rigidity/Bradykinesia/falls   -Continue Sinemet 25/250 mg 1 tab at 7am, 11am, 3pm, and 7pm with dose of entacapone 200 mg at same timing - to monitor symptoms while titrating down pramipexole, potentially to consider more frequent lower dosing and/or opicapone   -Continue Sinemet 25/100mg CR 1 tab at 12am   -Mirapex reduce to 0.50 mg TID x 3 days on 11/14  then 0.25 mg in 2-3 days (around 11/18) , then 0.125 mg TID (around 11/21) and then to BID (arund 11/23) and then to daily, then stop    -Follows with Dr. Veras  -Movement disorders following    --------------------------------------------------------  Parkinson's related non-motor symptoms    #Mood/memory/sleep  -Continue aricept 10mg daily   -Continue melatonin PRN   -Continue clonazepam to 0.25mg at bedtime 11/13  -Consider nuplazid for mild hallucinations   -Neuropsych to see   -Rec therapy     #Orthostatic hypotension  -Monitor OH vital signs  -Currently on metoprolol and flomax - monitor for continued need   - no significant orthostasis for now     #Pain control  - Tylenol PRN    #GI/Bowel Management/Constipation  - Continue Senna at bedtime   - Miralax daily    #Bladder management/BPH  - Continue to monitor PVR q 8 hours (SC if > 400)  - Monitor UO  - Continue flomax - monitor OH    ----------------------------------------------------------  Concurrent medical problems    #TOYA  -cr 1.4  -NS bolus 500mL x1  - Encourage oral fluids     #Mildly elevated TSH   - T3, T4 added -pending  -TSH 4.05    #HTN  #CAD - s/p CABG and PCI in the past  -Continue metoprolol 100mg ER - monitor vitals  -EKG  -Niacin 500mg daily   -Crestor 10mg daily   -Continue aspirin 81mg daily     #bilateral lower extremity edema -with blister  - B/L lower extremity venous doppler (11/04/2024): negative for DVT  - s/p lasix 40mg po x 3 days as inpatient- monitor for need   - elevate legs daily  - ACE wrap for compression if needed  - Echo TTE (11/10):  moderate calcific aortic stenosis with mild aortic insufficiency, biatrial enlargement, left ventricular ejection fraction, by visual estimation, 55 to 60%, normal global left ventricular systolic function, moderate mitral annular calcification, trace mitral valve regurgitation, dilatation of the aortic root, Dimesionless Index value is .49.    #DVT Prophylaxis  - Lovenox  - TEDs   - LE duplex 11/4 - negative for DVT    #Skin:  -Left leg blister - elevate leg cleanse BID, apply bacitracin and cover with adaptik and telfa   -Offset pressure.     FEN   - Diet - DASH diet  - Dysphagia  SLP - evaluation and treatment    Precautions / PROPHYLAXIS:   - Falls, Cardiac  - ortho: Weight bearing status: WBAT   - Lungs: Aspiration  - Pressure injury/Skin:  OOB to Chair, PT/OT      Danilo Javier  Pulmonary Disease  [address] [place] [id number]  Phone: [phone]  Fax: [phone]  Follow Up Time: 2 weeks

## 2024-11-16 LAB
ANION GAP SERPL CALC-SCNC: 6 MMOL/L — SIGNIFICANT CHANGE UP (ref 5–17)
BUN SERPL-MCNC: 26 MG/DL — HIGH (ref 7–23)
CALCIUM SERPL-MCNC: 9.4 MG/DL — SIGNIFICANT CHANGE UP (ref 8.4–10.5)
CHLORIDE SERPL-SCNC: 102 MMOL/L — SIGNIFICANT CHANGE UP (ref 96–108)
CO2 SERPL-SCNC: 28 MMOL/L — SIGNIFICANT CHANGE UP (ref 22–31)
CREAT SERPL-MCNC: 1.16 MG/DL — SIGNIFICANT CHANGE UP (ref 0.5–1.3)
EGFR: 66 ML/MIN/1.73M2 — SIGNIFICANT CHANGE UP
GLUCOSE SERPL-MCNC: 84 MG/DL — SIGNIFICANT CHANGE UP (ref 70–99)
POTASSIUM SERPL-MCNC: 4.2 MMOL/L — SIGNIFICANT CHANGE UP (ref 3.5–5.3)
POTASSIUM SERPL-SCNC: 4.2 MMOL/L — SIGNIFICANT CHANGE UP (ref 3.5–5.3)
SODIUM SERPL-SCNC: 136 MMOL/L — SIGNIFICANT CHANGE UP (ref 135–145)

## 2024-11-16 PROCEDURE — 99233 SBSQ HOSP IP/OBS HIGH 50: CPT | Mod: GC

## 2024-11-16 PROCEDURE — 99232 SBSQ HOSP IP/OBS MODERATE 35: CPT

## 2024-11-16 RX ADMIN — PRAMIPEXOLE 0.5 MILLIGRAM(S): 1.5 TABLET, EXTENDED RELEASE ORAL at 05:50

## 2024-11-16 RX ADMIN — METOPROLOL TARTRATE 100 MILLIGRAM(S): 100 TABLET, FILM COATED ORAL at 05:50

## 2024-11-16 RX ADMIN — ENTACAPONE 200 MILLIGRAM(S): 200 TABLET, FILM COATED ORAL at 06:47

## 2024-11-16 RX ADMIN — CARBIDOPA AND LEVODOPA 1 TABLET(S): 10; 100 TABLET ORAL at 14:51

## 2024-11-16 RX ADMIN — ENTACAPONE 200 MILLIGRAM(S): 200 TABLET, FILM COATED ORAL at 14:51

## 2024-11-16 RX ADMIN — ENTACAPONE 200 MILLIGRAM(S): 200 TABLET, FILM COATED ORAL at 18:58

## 2024-11-16 RX ADMIN — DONEPEZIL HYDROCHLORIDE 10 MILLIGRAM(S): 5 TABLET, FILM COATED ORAL at 21:33

## 2024-11-16 RX ADMIN — TAMSULOSIN HYDROCHLORIDE 0.4 MILLIGRAM(S): 0.4 CAPSULE ORAL at 21:33

## 2024-11-16 RX ADMIN — Medication 2 TABLET(S): at 21:33

## 2024-11-16 RX ADMIN — Medication 1 APPLICATION(S): at 18:58

## 2024-11-16 RX ADMIN — ROSUVASTATIN CALCIUM 10 MILLIGRAM(S): 5 TABLET, FILM COATED ORAL at 22:45

## 2024-11-16 RX ADMIN — CARBIDOPA AND LEVODOPA 1 TABLET(S): 10; 100 TABLET ORAL at 23:50

## 2024-11-16 RX ADMIN — SODIUM CHLORIDE 75 MILLILITER(S): 9 INJECTION, SOLUTION INTRAMUSCULAR; INTRAVENOUS; SUBCUTANEOUS at 05:41

## 2024-11-16 RX ADMIN — Medication 1 APPLICATION(S): at 05:51

## 2024-11-16 RX ADMIN — CLONAZEPAM 0.25 MILLIGRAM(S): 0.12 TABLET, ORALLY DISINTEGRATING ORAL at 21:34

## 2024-11-16 RX ADMIN — PRAMIPEXOLE 0.5 MILLIGRAM(S): 1.5 TABLET, EXTENDED RELEASE ORAL at 14:50

## 2024-11-16 RX ADMIN — CARBIDOPA AND LEVODOPA 1 TABLET(S): 10; 100 TABLET ORAL at 06:47

## 2024-11-16 RX ADMIN — Medication 81 MILLIGRAM(S): at 21:33

## 2024-11-16 RX ADMIN — ENOXAPARIN SODIUM 40 MILLIGRAM(S): 30 INJECTION SUBCUTANEOUS at 05:51

## 2024-11-16 RX ADMIN — CARBIDOPA AND LEVODOPA 1 TABLET(S): 10; 100 TABLET ORAL at 11:10

## 2024-11-16 RX ADMIN — CARBIDOPA AND LEVODOPA 1 TABLET(S): 10; 100 TABLET ORAL at 18:58

## 2024-11-16 RX ADMIN — Medication 500 MILLIGRAM(S): at 21:34

## 2024-11-16 RX ADMIN — ENTACAPONE 200 MILLIGRAM(S): 200 TABLET, FILM COATED ORAL at 11:04

## 2024-11-16 RX ADMIN — PRAMIPEXOLE 0.5 MILLIGRAM(S): 1.5 TABLET, EXTENDED RELEASE ORAL at 21:33

## 2024-11-16 NOTE — PROGRESS NOTE ADULT - SUBJECTIVE AND OBJECTIVE BOX
Patient seen and examined at bedside. no complaints.      ALLERGIES:  No Known Allergies    MEDICATIONS  (STANDING):  aspirin enteric coated 81 milliGRAM(s) Oral at bedtime  bacitracin   Ointment 1 Application(s) Topical two times a day  carbidopa/levodopa  25/250 1 Tablet(s) Oral <User Schedule>  carbidopa/levodopa CR 25/100 1 Tablet(s) Oral <User Schedule>  clonazePAM Oral Disintegrating Tablet 0.25 milliGRAM(s) Oral at bedtime  donepezil 10 milliGRAM(s) Oral at bedtime  enoxaparin Injectable 40 milliGRAM(s) SubCutaneous every 24 hours  entacapone 200 milliGRAM(s) Oral <User Schedule>  metoprolol succinate  milliGRAM(s) Oral daily  niacin  milliGRAM(s) Oral at bedtime  pramipexole 0.5 milliGRAM(s) Oral three times a day  rosuvastatin 10 milliGRAM(s) Oral at bedtime  senna 2 Tablet(s) Oral at bedtime  tamsulosin 0.4 milliGRAM(s) Oral at bedtime    MEDICATIONS  (PRN):  acetaminophen     Tablet .. 650 milliGRAM(s) Oral every 6 hours PRN Mild Pain (1 - 3)  melatonin 3 milliGRAM(s) Oral at bedtime PRN Insomnia    Vital Signs Last 24 Hrs  T(F): 97.9 (16 Nov 2024 08:36), Max: 98.6 (15 Nov 2024 20:18)  HR: 59 (16 Nov 2024 08:36) (59 - 73)  BP: 124/75 (16 Nov 2024 08:36) (107/68 - 129/77)  RR: 16 (16 Nov 2024 08:36) (16 - 16)  SpO2: 96% (16 Nov 2024 08:36) (96% - 98%)  I&O's Summary      PHYSICAL EXAM:    Gen: nad, resting in bed  Neuro: aaox3, no focal deficits  Heent: eomi b/l, no jvd, no oral exudates  Pulm: cta b/l, no w/r/r  CV: +s1s2, no m/r/g  Ab: soft, nt/nd, normoactive bs x 4  Extrem: no edema, pulses intact and equal  Skin: no rashes  Psych: normal    LABS:                        14.3   6.45  )-----------( 141      ( 14 Nov 2024 06:18 )             42.3       11-16    136  |  102  |  26  ----------------------------<  84  4.2   |  28  |  1.16    Ca    9.4      16 Nov 2024 06:36    TPro  7.5  /  Alb  3.8  /  TBili  0.9  /  DBili  x   /  AST  15  /  ALT  11  /  AlkPhos  82  11-15                                  Urinalysis Basic - ( 16 Nov 2024 06:36 )    Color: x / Appearance: x / SG: x / pH: x  Gluc: 84 mg/dL / Ketone: x  / Bili: x / Urobili: x   Blood: x / Protein: x / Nitrite: x   Leuk Esterase: x / RBC: x / WBC x   Sq Epi: x / Non Sq Epi: x / Bacteria: x        COVID-19 PCR: NotDetec (11-08-24 @ 16:40)      RADIOLOGY & ADDITIONAL TESTS:    Care Discussed with Consultants/Other Providers:

## 2024-11-16 NOTE — PROGRESS NOTE ADULT - SUBJECTIVE AND OBJECTIVE BOX
CC: Abnormality of gait and mobility, parkinson's    HPI: Patient with no new medical issues today. No reported hallucinations.  Pain controlled, no chest pain, no N/V, no Fevers/Chills. No other new ROS  Has been tolerating rehabilitation program.    acetaminophen     Tablet .. 650 milliGRAM(s) Oral every 6 hours PRN  aspirin enteric coated 81 milliGRAM(s) Oral at bedtime  bacitracin   Ointment 1 Application(s) Topical two times a day  carbidopa/levodopa  25/250 1 Tablet(s) Oral <User Schedule>  carbidopa/levodopa CR 25/100 1 Tablet(s) Oral <User Schedule>  clonazePAM Oral Disintegrating Tablet 0.25 milliGRAM(s) Oral at bedtime  donepezil 10 milliGRAM(s) Oral at bedtime  enoxaparin Injectable 40 milliGRAM(s) SubCutaneous every 24 hours  entacapone 200 milliGRAM(s) Oral <User Schedule>  melatonin 3 milliGRAM(s) Oral at bedtime PRN  metoprolol succinate  milliGRAM(s) Oral daily  niacin  milliGRAM(s) Oral at bedtime  pramipexole 0.5 milliGRAM(s) Oral three times a day  rosuvastatin 10 milliGRAM(s) Oral at bedtime  senna 2 Tablet(s) Oral at bedtime  tamsulosin 0.4 milliGRAM(s) Oral at bedtime       T(C): 36.6 (11-16-24 @ 08:36), Max: 37 (11-15-24 @ 20:18)  HR: 59 (11-16-24 @ 08:36) (59 - 73)  BP: 124/75 (11-16-24 @ 08:36) (107/68 - 129/77)  RR: 16 (11-16-24 @ 08:36) (16 - 16)  SpO2: 96% (11-16-24 @ 08:36) (96% - 98%)    In NAD  HEENT- EOMI  Heart- Well Perfused  Lungs- No resp distress, no use of accessory resp muscles  Abd- + BS, NT  Ext- No calf pain  Neuro- Exam unchanged, 5/5 motor strength bilateral UE and LE  Psych- Affect wnl        Assessment/Plan:  74 year old male admitted to  Parkinson's program.    #Parkinson's Disease now with gait Instability, ADL impairments and Functional impairments  - Continue Comprehensive Rehab Program of PT/OT/SLP  - Sinemet 25/250 mg and Sinemet 25/100mg tabs  -Mirapex     #HTN  #CAD - s/p CABG and PCI in the past  -Continue metoprolol 100mg ER -  -Niacin 500mg daily   -Crestor 10mg daily   -Continue aspirin 81mg daily     #Orthostatic hypotension  -Monitor OH vital signs  - Currently on metoprolol and flomax  - no significant orthostasis    #Pain control  - Tylenol PRN    #GI/Bowel Management/Constipation  - Continue Senna at bedtime   - Miralax daily    #TOYA  -s/p IVF 1000 ml  -Bun/Cr improved-- 26/1.16 (11/16)  - Encourage oral fluids  - continue to monitor    #Mood/memory/sleep  -aricept 10mg daily   -melatonin PRN   -clonazepam   -Neuropsych   -Rec therapy     #DVT Prophylaxis  - Lovenox  - TEDs   - LE duplex 11/4 - negative for DVT    reviewed labs   Discussed medical updates and plan with nursing and hospitalist on team rounds

## 2024-11-16 NOTE — CHART NOTE - NSCHARTNOTEFT_GEN_A_CORE
NUTRITION FOLLOW UP    SOURCE: Patient [X)   Medical Record (X)  Pt reports good appetite, po intake breakfast 80%. Pt currently not drinking Gene. Educated pt on Gene to promote wound healing, pt agreeable to drink 1-2 Gene per day.     DIET: DASH/TLC; Gene BID    PATIENT REPORT [x] other: no GI distress    PO INTAKE:  [x]   %                CURRENT WEIGHT: 97 kg - (11/8)    PERTINENT MEDS:   Pertinent Medications: MEDICATIONS  (STANDING):  aspirin enteric coated 81 milliGRAM(s) Oral at bedtime  bacitracin   Ointment 1 Application(s) Topical two times a day  carbidopa/levodopa  25/250 1 Tablet(s) Oral <User Schedule>  carbidopa/levodopa CR 25/100 1 Tablet(s) Oral <User Schedule>  clonazePAM Oral Disintegrating Tablet 0.25 milliGRAM(s) Oral at bedtime  donepezil 10 milliGRAM(s) Oral at bedtime  enoxaparin Injectable 40 milliGRAM(s) SubCutaneous every 24 hours  entacapone 200 milliGRAM(s) Oral <User Schedule>  metoprolol succinate  milliGRAM(s) Oral daily  niacin  milliGRAM(s) Oral at bedtime  pramipexole 0.5 milliGRAM(s) Oral three times a day  rosuvastatin 10 milliGRAM(s) Oral at bedtime  senna 2 Tablet(s) Oral at bedtime  tamsulosin 0.4 milliGRAM(s) Oral at bedtime    MEDICATIONS  (PRN):  acetaminophen     Tablet .. 650 milliGRAM(s) Oral every 6 hours PRN Mild Pain (1 - 3)  melatonin 3 milliGRAM(s) Oral at bedtime PRN Insomnia    PERTINENT LABS:  11-16 Na136 mmol/L Glu 84 mg/dL K+ 4.2 mmol/L Cr  1.16 mg/dL BUN 26 mg/dL[H] 11-15 Alb 3.8 g/dL    SKIN: Wounds on left mid shin and left lower ankle  EDEMA: 1+ left and right leg   LAST BM: 11/16    ESTIMATED NEEDS:   [X] no change since previous assessment    PREVIOUS NUTRITION DIAGNOSIS:  Increased nutrient needs    NUTRITION DIAGNOSIS is :  (x)  Ongoing      NEW NUTRITION DIAGNOSIS: N/A     NUTRITION RECOMMENDATIONS:   1. DASH/TLC Diet  2. Gene BID     MONITORING AND EVALUATION:   1. Tolerance to diet prescription   2. PO intake  3. Weights  4. Labs  5. Follow Up per protocol     RD to remain available  Brittany Erickson MS,RD,CDN

## 2024-11-16 NOTE — PROGRESS NOTE ADULT - ASSESSMENT
74 year old male with PMH of HTN, HLD, CAD s/p PCI/CABG x 2, BPH, Parkinson's Disease who presented to [facility] on 11/6 from home for frequent falls and hallucinations. Falls have been increasing in frequency, now occurring 2-3x a week attributed to gait instability due to stiffness and feeling "frozen". The falls have led him to become more immobile leading to worsening LE swelling. Patient also endorses increase hallucination episodes (e.g. snakes in the house). Medical workup significant for TOYA, negative for infection and acute intracranial pathology. S/P lasix for b/l LE edema. He was subsequently referred to [facility] for acute inpatient rehab On 11/8/24.   TOYA  - resolved  - IVF stopped   - monitor urine output  - avoid nephrotoxic meds    #Parkinson's Disease  #Rigidity/Bradykinesia/falls  -Comprehensive Rehab Program  -Continue Sinemet 25/250 mg 1 tab at 7am, 11am, 3pm, and 7pm with dose of entacapone 200mg at same timing  -Continue Sinemet 25/100mg CR 1 tab at 12am   -Mirapex 1mg TID  -aricept 10mg daily   -melatonin PRN   -further medication changes per movement disorder specialist.   -fall, aspiration precautions    #Orthostatic hypotension  -Currently on metoprolol and flomax - monitor for continued need  -Monitor OH vital signs  #HTN  #CAD - s/p remote CABG and PCI  # HLD  -Continue metoprolol 100mg ER   -Niacin 500mg daily   -Crestor 10mg daily   -Continue aspirin 81mg daily   -monitor vitals    #BL lower extremity edema  - now resolved.  - B/L lower extremity venous doppler (11/04/2024): negative for DVT  - s/p lasix 40mg po x 3 days as inpatient - subsequent TOYA, would avoid further use for now and use conservative management  - elevate legs daily  - TTE: EF 50-55%. calcified AS, AR. Bi Atrial enlargement  - monitor    # Abnormal TSH  - TSh mildly elevated  - no symptoms  - repeat TFT in 4-6 weeks    # mild hyponatremia  - sodium 134  - monitor    #Constipation  - Continue Senna at bedtime   - Miralax daily    #BPH  -monitor PVR q 8 hours (SC if > 400)  -Monitor UO  -Continue flomax    #DVT Prophylaxis  - Lovenox

## 2024-11-17 PROCEDURE — 99232 SBSQ HOSP IP/OBS MODERATE 35: CPT

## 2024-11-17 PROCEDURE — 99232 SBSQ HOSP IP/OBS MODERATE 35: CPT | Mod: GC

## 2024-11-17 RX ORDER — POLYETHYLENE GLYCOL 3350 17 G/17G
17 POWDER, FOR SOLUTION ORAL DAILY
Refills: 0 | Status: DISCONTINUED | OUTPATIENT
Start: 2024-11-17 | End: 2024-11-22

## 2024-11-17 RX ADMIN — Medication 2 TABLET(S): at 21:15

## 2024-11-17 RX ADMIN — PRAMIPEXOLE 0.5 MILLIGRAM(S): 1.5 TABLET, EXTENDED RELEASE ORAL at 06:45

## 2024-11-17 RX ADMIN — ENOXAPARIN SODIUM 40 MILLIGRAM(S): 30 INJECTION SUBCUTANEOUS at 06:48

## 2024-11-17 RX ADMIN — Medication 81 MILLIGRAM(S): at 21:15

## 2024-11-17 RX ADMIN — DONEPEZIL HYDROCHLORIDE 10 MILLIGRAM(S): 5 TABLET, FILM COATED ORAL at 21:15

## 2024-11-17 RX ADMIN — POLYETHYLENE GLYCOL 3350 17 GRAM(S): 17 POWDER, FOR SOLUTION ORAL at 20:08

## 2024-11-17 RX ADMIN — METOPROLOL TARTRATE 100 MILLIGRAM(S): 100 TABLET, FILM COATED ORAL at 06:45

## 2024-11-17 RX ADMIN — CARBIDOPA AND LEVODOPA 1 TABLET(S): 10; 100 TABLET ORAL at 15:16

## 2024-11-17 RX ADMIN — CARBIDOPA AND LEVODOPA 1 TABLET(S): 10; 100 TABLET ORAL at 06:44

## 2024-11-17 RX ADMIN — CARBIDOPA AND LEVODOPA 1 TABLET(S): 10; 100 TABLET ORAL at 11:02

## 2024-11-17 RX ADMIN — CARBIDOPA AND LEVODOPA 1 TABLET(S): 10; 100 TABLET ORAL at 18:40

## 2024-11-17 RX ADMIN — CLONAZEPAM 0.25 MILLIGRAM(S): 0.12 TABLET, ORALLY DISINTEGRATING ORAL at 21:15

## 2024-11-17 RX ADMIN — TAMSULOSIN HYDROCHLORIDE 0.4 MILLIGRAM(S): 0.4 CAPSULE ORAL at 21:14

## 2024-11-17 RX ADMIN — PRAMIPEXOLE 0.5 MILLIGRAM(S): 1.5 TABLET, EXTENDED RELEASE ORAL at 15:15

## 2024-11-17 RX ADMIN — Medication 500 MILLIGRAM(S): at 21:15

## 2024-11-17 RX ADMIN — ROSUVASTATIN CALCIUM 10 MILLIGRAM(S): 5 TABLET, FILM COATED ORAL at 21:14

## 2024-11-17 RX ADMIN — Medication 1 APPLICATION(S): at 18:40

## 2024-11-17 RX ADMIN — ENTACAPONE 200 MILLIGRAM(S): 200 TABLET, FILM COATED ORAL at 06:44

## 2024-11-17 RX ADMIN — Medication 1 APPLICATION(S): at 06:45

## 2024-11-17 RX ADMIN — PRAMIPEXOLE 0.5 MILLIGRAM(S): 1.5 TABLET, EXTENDED RELEASE ORAL at 21:15

## 2024-11-17 RX ADMIN — ENTACAPONE 200 MILLIGRAM(S): 200 TABLET, FILM COATED ORAL at 11:02

## 2024-11-17 RX ADMIN — ENTACAPONE 200 MILLIGRAM(S): 200 TABLET, FILM COATED ORAL at 15:15

## 2024-11-17 RX ADMIN — ACETAMINOPHEN, DIPHENHYDRAMINE HCL, PHENYLEPHRINE HCL 3 MILLIGRAM(S): 325; 25; 5 TABLET ORAL at 01:23

## 2024-11-17 RX ADMIN — ENTACAPONE 200 MILLIGRAM(S): 200 TABLET, FILM COATED ORAL at 18:40

## 2024-11-17 NOTE — PROGRESS NOTE ADULT - SUBJECTIVE AND OBJECTIVE BOX
CC: Abnormality of gait and mobility, parkinson's    HPI: Patient with no new medical issues today, seen sitting in the wheelchair at bedside. No reported hallucinations at this time. Reports he feels that he is getting stronger with therapy  Pain controlled, no chest pain, no N/V, no Fevers/Chills. No other new ROS  Has been tolerating rehabilitation program.    acetaminophen     Tablet .. 650 milliGRAM(s) Oral every 6 hours PRN  aspirin enteric coated 81 milliGRAM(s) Oral at bedtime  bacitracin   Ointment 1 Application(s) Topical two times a day  carbidopa/levodopa  25/250 1 Tablet(s) Oral <User Schedule>  carbidopa/levodopa CR 25/100 1 Tablet(s) Oral <User Schedule>  clonazePAM Oral Disintegrating Tablet 0.25 milliGRAM(s) Oral at bedtime  donepezil 10 milliGRAM(s) Oral at bedtime  enoxaparin Injectable 40 milliGRAM(s) SubCutaneous every 24 hours  entacapone 200 milliGRAM(s) Oral <User Schedule>  melatonin 3 milliGRAM(s) Oral at bedtime PRN  metoprolol succinate  milliGRAM(s) Oral daily  niacin  milliGRAM(s) Oral at bedtime  pramipexole 0.5 milliGRAM(s) Oral three times a day  rosuvastatin 10 milliGRAM(s) Oral at bedtime  senna 2 Tablet(s) Oral at bedtime  tamsulosin 0.4 milliGRAM(s) Oral at bedtime       T(C): 36.3 (11-17-24 @ 07:52)  T(F): 97.3 (11-17-24 @ 07:52), Max: 97.7 (11-16-24 @ 19:56)  HR: 59 (11-17-24 @ 07:52) (59 - 59)  BP: 119/74 (11-17-24 @ 07:52) (119/74 - 119/74)  RR:  (16 - 16)  SpO2:  (98% - 99%)  Wt(kg): --      In NAD  HEENT- EOMI  Heart- Well Perfused  Lungs- No resp distress, no use of accessory resp muscles  Abd- + BS, NT  Ext- No calf pain  Neuro- Exam unchanged, 5/5 motor strength bilateral UE and LE  Psych- Affect wnl        Assessment/Plan:  74 year old male admitted to  Parkinson's program.    #Parkinson's Disease now with gait Instability, ADL impairments and Functional impairments  - Continue Comprehensive Rehab Program of PT/OT/SLP  - Sinemet 25/250 mg and Sinemet 25/100mg tabs  -Mirapex     #HTN  #CAD - s/p CABG and PCI in the past  -Continue metoprolol 100mg ER -  -Niacin 500mg daily   -Crestor 10mg daily   -Continue aspirin 81mg daily     #Orthostatic hypotension  -Monitor OH vital signs  -c/w metoprolol and flomax      #Pain control  - Tylenol PRN    #GI/Bowel Management/Constipation  - Continue Senna at bedtime   - Miralax daily    #TOYA  -s/p IVF 1000 ml  -Bun/Cr improved-- 26/1.16 (11/16)  - Encourage oral fluids  - continue to monitor, repeat with labs tomorrow    #Mood/memory/sleep  -aricept 10mg daily   -melatonin PRN   -clonazepam   -Neuropsych   -Rec therapy     #DVT Prophylaxis  - Lovenox  - TEDs   - LE duplex 11/4 - negative for DVT    reviewed labs   Discussed medical updates and plan with nursing and hospitalist on team rounds

## 2024-11-17 NOTE — PROGRESS NOTE ADULT - SUBJECTIVE AND OBJECTIVE BOX
Hospitalist: Pollo Jimenez DO    CHIEF COMPLAINT: Patient is a 74y old  male who presents with a chief complaint of Parkinson's (17 Nov 2024 10:14)      SUBJECTIVE / OVERNIGHT EVENTS: Patient seen and examined. No acute events overnight. Pain well controlled and patient without any complaints.    MEDICATIONS  (STANDING):  aspirin enteric coated 81 milliGRAM(s) Oral at bedtime  bacitracin   Ointment 1 Application(s) Topical two times a day  carbidopa/levodopa  25/250 1 Tablet(s) Oral <User Schedule>  carbidopa/levodopa CR 25/100 1 Tablet(s) Oral <User Schedule>  clonazePAM Oral Disintegrating Tablet 0.25 milliGRAM(s) Oral at bedtime  donepezil 10 milliGRAM(s) Oral at bedtime  enoxaparin Injectable 40 milliGRAM(s) SubCutaneous every 24 hours  entacapone 200 milliGRAM(s) Oral <User Schedule>  metoprolol succinate  milliGRAM(s) Oral daily  niacin  milliGRAM(s) Oral at bedtime  pramipexole 0.5 milliGRAM(s) Oral three times a day  rosuvastatin 10 milliGRAM(s) Oral at bedtime  senna 2 Tablet(s) Oral at bedtime  tamsulosin 0.4 milliGRAM(s) Oral at bedtime    MEDICATIONS  (PRN):  acetaminophen     Tablet .. 650 milliGRAM(s) Oral every 6 hours PRN Mild Pain (1 - 3)  melatonin 3 milliGRAM(s) Oral at bedtime PRN Insomnia      VITALS:  T(F): 97.3 (11-17-24 @ 07:52), Max: 97.7 (11-16-24 @ 19:56)  HR: 59 (11-17-24 @ 07:52) (59 - 59)  BP: 119/74 (11-17-24 @ 07:52) (119/74 - 119/74)  RR: 16 (11-17-24 @ 07:52) (16 - 16)  SpO2: 99% (11-17-24 @ 07:52)      REVIEW OF SYSTEMS:  For ROV please refer back to H&P     PHYSICAL EXAM:  Gen: nad, resting in bed  Neuro: aaox3, no focal deficits  Heent: eomi b/l, no jvd, no oral exudates  Pulm: cta b/l, no w/r/r  CV: +s1s2, no m/r/g  Ab: soft, nt/nd, normoactive bs x 4  Extrem: no edema, pulses intact and equal  Skin: no rashes  Psych: normal        LABS:              x                    136  | 28   | 26           x     >-----------< x       ------------------------< 84                    x                    4.2  | 102  | 1.16                                         Ca 9.4   Mg x     Ph x          MICROBIOLOGY:  Urinalysis Basic - ( 16 Nov 2024 06:36 )    Color: x / Appearance: x / SG: x / pH: x  Gluc: 84 mg/dL / Ketone: x  / Bili: x / Urobili: x   Blood: x / Protein: x / Nitrite: x   Leuk Esterase: x / RBC: x / WBC x   Sq Epi: x / Non Sq Epi: x / Bacteria: x      RADIOLOGY & ADDITIONAL TESTS:    Imaging Personally Reviewed:    [X] Consultant(s) Notes Reviewed:  [X] Care Discussed with Consultants/Other Providers:

## 2024-11-17 NOTE — PROGRESS NOTE ADULT - ASSESSMENT
74 year old male with PMH of HTN, HLD, CAD s/p PCI/CABG x 2, BPH, Parkinson's Disease who presented to [facility] on 11/6 from home for frequent falls and hallucinations. Falls have been increasing in frequency, now occurring 2-3x a week attributed to gait instability due to stiffness and feeling "frozen". The falls have led him to become more immobile leading to worsening LE swelling. Patient also endorses increase hallucination episodes (e.g. snakes in the house). Medical workup significant for TOYA, negative for infection and acute intracranial pathology. S/P lasix for b/l LE edema. He was subsequently referred to [facility] for acute inpatient rehab On 11/8/24.   TOYA  - resolved  - IVF stopped   - monitor urine output  - avoid nephrotoxic meds    #Parkinson's Disease  #Rigidity/Bradykinesia/falls  -Comprehensive Rehab Program  -Continue Sinemet 25/250 mg 1 tab at 7am, 11am, 3pm, and 7pm with dose of entacapone 200mg at same timing  -Continue Sinemet 25/100mg CR 1 tab at 12am   -Mirapex 1mg TID  -aricept 10mg daily   -melatonin PRN   -further medication changes per movement disorder specialist.   -fall, aspiration precautions    #Orthostatic hypotension  -Currently on metoprolol and flomax - monitor for continued need  -Monitor OH vital signs  #HTN  #CAD - s/p remote CABG and PCI  # HLD  -Continue metoprolol 100mg ER   -Niacin 500mg daily   -Crestor 10mg daily   -Continue aspirin 81mg daily   -monitor vitals    #BL lower extremity edema  - now resolved.  - B/L lower extremity venous doppler (11/04/2024): negative for DVT  - s/p lasix 40mg po x 3 days as inpatient - subsequent TOYA, would avoid further use for now and use conservative management  - elevate legs daily  - TTE: EF 50-55%. calcified AS, AR. Bi Atrial enlargement  - monitor    # Abnormal TSH  - TSh mildly elevated  - no symptoms  - repeat TFT in 4-6 weeks    # mild hyponatremia  - sodium 134  - monitor    #Constipation  - Continue Senna at bedtime   - Miralax daily    #BPH  -monitor PVR q 8 hours (SC if > 400)  -Monitor UO  -Continue flomax    #DVT Prophylaxis  - Lovenox

## 2024-11-18 LAB
ALBUMIN SERPL ELPH-MCNC: 3.6 G/DL — SIGNIFICANT CHANGE UP (ref 3.3–5)
ALP SERPL-CCNC: 74 U/L — SIGNIFICANT CHANGE UP (ref 40–120)
ALT FLD-CCNC: 11 U/L — SIGNIFICANT CHANGE UP (ref 10–45)
ANION GAP SERPL CALC-SCNC: 7 MMOL/L — SIGNIFICANT CHANGE UP (ref 5–17)
AST SERPL-CCNC: 16 U/L — SIGNIFICANT CHANGE UP (ref 10–40)
BASOPHILS # BLD AUTO: 0.07 K/UL — SIGNIFICANT CHANGE UP (ref 0–0.2)
BASOPHILS NFR BLD AUTO: 0.8 % — SIGNIFICANT CHANGE UP (ref 0–2)
BILIRUB SERPL-MCNC: 1 MG/DL — SIGNIFICANT CHANGE UP (ref 0.2–1.2)
BUN SERPL-MCNC: 35 MG/DL — HIGH (ref 7–23)
CALCIUM SERPL-MCNC: 9.3 MG/DL — SIGNIFICANT CHANGE UP (ref 8.4–10.5)
CHLORIDE SERPL-SCNC: 103 MMOL/L — SIGNIFICANT CHANGE UP (ref 96–108)
CO2 SERPL-SCNC: 28 MMOL/L — SIGNIFICANT CHANGE UP (ref 22–31)
CREAT SERPL-MCNC: 1.23 MG/DL — SIGNIFICANT CHANGE UP (ref 0.5–1.3)
EGFR: 61 ML/MIN/1.73M2 — SIGNIFICANT CHANGE UP
EOSINOPHIL # BLD AUTO: 0.19 K/UL — SIGNIFICANT CHANGE UP (ref 0–0.5)
EOSINOPHIL NFR BLD AUTO: 2.2 % — SIGNIFICANT CHANGE UP (ref 0–6)
GLUCOSE SERPL-MCNC: 92 MG/DL — SIGNIFICANT CHANGE UP (ref 70–99)
HCT VFR BLD CALC: 41.2 % — SIGNIFICANT CHANGE UP (ref 39–50)
HGB BLD-MCNC: 13.9 G/DL — SIGNIFICANT CHANGE UP (ref 13–17)
IMM GRANULOCYTES NFR BLD AUTO: 0.5 % — SIGNIFICANT CHANGE UP (ref 0–0.9)
LYMPHOCYTES # BLD AUTO: 1.7 K/UL — SIGNIFICANT CHANGE UP (ref 1–3.3)
LYMPHOCYTES # BLD AUTO: 20 % — SIGNIFICANT CHANGE UP (ref 13–44)
MCHC RBC-ENTMCNC: 28.4 PG — SIGNIFICANT CHANGE UP (ref 27–34)
MCHC RBC-ENTMCNC: 33.7 G/DL — SIGNIFICANT CHANGE UP (ref 32–36)
MCV RBC AUTO: 84.3 FL — SIGNIFICANT CHANGE UP (ref 80–100)
MONOCYTES # BLD AUTO: 0.8 K/UL — SIGNIFICANT CHANGE UP (ref 0–0.9)
MONOCYTES NFR BLD AUTO: 9.4 % — SIGNIFICANT CHANGE UP (ref 2–14)
NEUTROPHILS # BLD AUTO: 5.7 K/UL — SIGNIFICANT CHANGE UP (ref 1.8–7.4)
NEUTROPHILS NFR BLD AUTO: 67.1 % — SIGNIFICANT CHANGE UP (ref 43–77)
NRBC # BLD: 0 /100 WBCS — SIGNIFICANT CHANGE UP (ref 0–0)
PLATELET # BLD AUTO: 141 K/UL — LOW (ref 150–400)
POTASSIUM SERPL-MCNC: 4.3 MMOL/L — SIGNIFICANT CHANGE UP (ref 3.5–5.3)
POTASSIUM SERPL-SCNC: 4.3 MMOL/L — SIGNIFICANT CHANGE UP (ref 3.5–5.3)
PROT SERPL-MCNC: 7.1 G/DL — SIGNIFICANT CHANGE UP (ref 6–8.3)
RBC # BLD: 4.89 M/UL — SIGNIFICANT CHANGE UP (ref 4.2–5.8)
RBC # FLD: 15.2 % — HIGH (ref 10.3–14.5)
SODIUM SERPL-SCNC: 138 MMOL/L — SIGNIFICANT CHANGE UP (ref 135–145)
WBC # BLD: 8.5 K/UL — SIGNIFICANT CHANGE UP (ref 3.8–10.5)
WBC # FLD AUTO: 8.5 K/UL — SIGNIFICANT CHANGE UP (ref 3.8–10.5)

## 2024-11-18 PROCEDURE — 99232 SBSQ HOSP IP/OBS MODERATE 35: CPT

## 2024-11-18 RX ORDER — CLONAZEPAM 0.12 MG/1
0.25 TABLET, ORALLY DISINTEGRATING ORAL AT BEDTIME
Refills: 0 | Status: DISCONTINUED | OUTPATIENT
Start: 2024-11-18 | End: 2024-11-22

## 2024-11-18 RX ORDER — PRAMIPEXOLE 1.5 MG/1
0.25 TABLET, EXTENDED RELEASE ORAL THREE TIMES A DAY
Refills: 0 | Status: DISCONTINUED | OUTPATIENT
Start: 2024-11-18 | End: 2024-11-21

## 2024-11-18 RX ADMIN — METOPROLOL TARTRATE 100 MILLIGRAM(S): 100 TABLET, FILM COATED ORAL at 05:47

## 2024-11-18 RX ADMIN — CARBIDOPA AND LEVODOPA 1 TABLET(S): 10; 100 TABLET ORAL at 07:04

## 2024-11-18 RX ADMIN — Medication 81 MILLIGRAM(S): at 21:25

## 2024-11-18 RX ADMIN — DONEPEZIL HYDROCHLORIDE 10 MILLIGRAM(S): 5 TABLET, FILM COATED ORAL at 21:25

## 2024-11-18 RX ADMIN — PRAMIPEXOLE 0.25 MILLIGRAM(S): 1.5 TABLET, EXTENDED RELEASE ORAL at 14:45

## 2024-11-18 RX ADMIN — CARBIDOPA AND LEVODOPA 1 TABLET(S): 10; 100 TABLET ORAL at 23:02

## 2024-11-18 RX ADMIN — PRAMIPEXOLE 0.25 MILLIGRAM(S): 1.5 TABLET, EXTENDED RELEASE ORAL at 21:25

## 2024-11-18 RX ADMIN — ENTACAPONE 200 MILLIGRAM(S): 200 TABLET, FILM COATED ORAL at 18:46

## 2024-11-18 RX ADMIN — Medication 2 TABLET(S): at 21:25

## 2024-11-18 RX ADMIN — Medication 1 APPLICATION(S): at 05:47

## 2024-11-18 RX ADMIN — ENTACAPONE 200 MILLIGRAM(S): 200 TABLET, FILM COATED ORAL at 14:45

## 2024-11-18 RX ADMIN — Medication 500 MILLIGRAM(S): at 21:25

## 2024-11-18 RX ADMIN — CARBIDOPA AND LEVODOPA 1 TABLET(S): 10; 100 TABLET ORAL at 00:04

## 2024-11-18 RX ADMIN — CARBIDOPA AND LEVODOPA 1 TABLET(S): 10; 100 TABLET ORAL at 14:44

## 2024-11-18 RX ADMIN — PRAMIPEXOLE 0.5 MILLIGRAM(S): 1.5 TABLET, EXTENDED RELEASE ORAL at 05:47

## 2024-11-18 RX ADMIN — ENTACAPONE 200 MILLIGRAM(S): 200 TABLET, FILM COATED ORAL at 07:04

## 2024-11-18 RX ADMIN — CARBIDOPA AND LEVODOPA 1 TABLET(S): 10; 100 TABLET ORAL at 11:05

## 2024-11-18 RX ADMIN — ENTACAPONE 200 MILLIGRAM(S): 200 TABLET, FILM COATED ORAL at 11:04

## 2024-11-18 RX ADMIN — ROSUVASTATIN CALCIUM 10 MILLIGRAM(S): 5 TABLET, FILM COATED ORAL at 21:26

## 2024-11-18 RX ADMIN — CARBIDOPA AND LEVODOPA 1 TABLET(S): 10; 100 TABLET ORAL at 18:46

## 2024-11-18 RX ADMIN — CLONAZEPAM 0.25 MILLIGRAM(S): 0.12 TABLET, ORALLY DISINTEGRATING ORAL at 21:25

## 2024-11-18 RX ADMIN — ENOXAPARIN SODIUM 40 MILLIGRAM(S): 30 INJECTION SUBCUTANEOUS at 05:47

## 2024-11-18 RX ADMIN — TAMSULOSIN HYDROCHLORIDE 0.4 MILLIGRAM(S): 0.4 CAPSULE ORAL at 21:25

## 2024-11-18 NOTE — PROGRESS NOTE ADULT - SUBJECTIVE AND OBJECTIVE BOX
PROGRESS NOTE:     Patient is a 74y old  Male who presents with a chief complaint of Parkinson's (17 Nov 2024 17:45)      SUBJECTIVE / OVERNIGHT EVENTS: pt was seen and evaluated today   no complains offered      ADDITIONAL REVIEW OF SYSTEMS: as per HPI    MEDICATIONS  (STANDING):  aspirin enteric coated 81 milliGRAM(s) Oral at bedtime  bacitracin   Ointment 1 Application(s) Topical two times a day  carbidopa/levodopa  25/250 1 Tablet(s) Oral <User Schedule>  carbidopa/levodopa CR 25/100 1 Tablet(s) Oral <User Schedule>  clonazePAM Oral Disintegrating Tablet 0.25 milliGRAM(s) Oral at bedtime  donepezil 10 milliGRAM(s) Oral at bedtime  enoxaparin Injectable 40 milliGRAM(s) SubCutaneous every 24 hours  entacapone 200 milliGRAM(s) Oral <User Schedule>  metoprolol succinate  milliGRAM(s) Oral daily  niacin  milliGRAM(s) Oral at bedtime  polyethylene glycol 3350 17 Gram(s) Oral daily  pramipexole 0.5 milliGRAM(s) Oral three times a day  rosuvastatin 10 milliGRAM(s) Oral at bedtime  senna 2 Tablet(s) Oral at bedtime  tamsulosin 0.4 milliGRAM(s) Oral at bedtime    MEDICATIONS  (PRN):  acetaminophen     Tablet .. 650 milliGRAM(s) Oral every 6 hours PRN Mild Pain (1 - 3)  melatonin 3 milliGRAM(s) Oral at bedtime PRN Insomnia      CAPILLARY BLOOD GLUCOSE        I&O's Summary      PHYSICAL EXAM:  Vital Signs Last 24 Hrs  T(C): 36.8 (18 Nov 2024 07:57), Max: 36.8 (18 Nov 2024 07:57)  T(F): 98.2 (18 Nov 2024 07:57), Max: 98.2 (18 Nov 2024 07:57)  HR: 58 (18 Nov 2024 07:57) (58 - 58)  BP: 115/73 (18 Nov 2024 07:57) (115/73 - 115/73)  BP(mean): --  RR: 16 (18 Nov 2024 07:57) (16 - 16)  SpO2: 98% (18 Nov 2024 07:57) (98% - 98%)    Parameters below as of 18 Nov 2024 07:57  Patient On (Oxygen Delivery Method): room air        PHYSICAL EXAM:  Gen: nad, resting in bed  Neuro: awake and alert   Heent: eomi b/l, no jvd, no oral exudates  Pulm: cta b/l, no w/r/r  CV: +s1s2, no m/r/g  Ab: soft, nt/nd, normoactive bs x 4  Extrem: no edema, pulses intact and equal  Skin: no rashes  Psych: normal      LABS:                        13.9   8.50  )-----------( 141      ( 18 Nov 2024 05:39 )             41.2     11-18    138  |  103  |  35[H]  ----------------------------<  92  4.3   |  28  |  1.23    Ca    9.3      18 Nov 2024 05:39    TPro  7.1  /  Alb  3.6  /  TBili  1.0  /  DBili  x   /  AST  16  /  ALT  11  /  AlkPhos  74  11-18          Urinalysis Basic - ( 18 Nov 2024 05:39 )    Color: x / Appearance: x / SG: x / pH: x  Gluc: 92 mg/dL / Ketone: x  / Bili: x / Urobili: x   Blood: x / Protein: x / Nitrite: x   Leuk Esterase: x / RBC: x / WBC x   Sq Epi: x / Non Sq Epi: x / Bacteria: x          case discussed during IDR and with consultants

## 2024-11-18 NOTE — PROGRESS NOTE ADULT - ASSESSMENT
74 year old male with PMH of HTN, HLD, CAD s/p PCI/CABG x 2 (last 8 years ago), BPH, Parkinson's Disease (dx 8 years ago, follows with Dr. Claudio) who presented to [facility] on 11/6 from home for frequent falls and hallucinations. He has history of occasional falls with increased frequency over the last 1-2 weeks (2-3 times this week) attributed to gait instability due to stiffness and feeling "frozen". Due to these falls, he has been more immobile and over last weekend, family noted increased LE edema for which he takes PRN lasix. Patient also endorses increase hallucination episodes (e.g. snakes in the house, having boots on when he didn't). In the ED, he was afebrile and hemodynamically stable. CTH is negative for acute intracranial pathology. LE duplex negative for DVT. He was evaluated by PT, OT, and PMR and  recommended for inpatient acute rehab. Neurology consulted for medication optimization for Parkinson's disease. Pt given lasix for b/l LE edema. He was admitted to [facility] On 11/8/24.     #Parkinson's Disease now with gait Instability, ADL impairments and Functional impairments  - Continue Comprehensive Rehab Program of PT/OT/SLP    ------------------------------------------------------  Parkinson's related motor symptoms    #Rigidity/Bradykinesia/falls   -Continue Sinemet 25/250 mg 1 tab at 7am, 11am, 3pm, and 7pm with dose of entacapone 200 mg at same timing - to monitor symptoms while titrating down pramipexole, potentially to consider more frequent lower dosing and/or opicapone   -Continue Sinemet 25/100mg CR 1 tab at 12am   -Mirapex reduce 0.25 mg in 2-3 days (11/18) , then 0.125 mg TID (around 11/21) and then to BID (arund 11/23) and then to daily, then stop    -Follows with Dr. Veras  -Movement disorders following    --------------------------------------------------------  Parkinson's related non-motor symptoms    #Mood/memory/sleep  -Continue aricept 10mg daily   -Continue melatonin PRN   -Continue clonazepam to 0.25mg at bedtime 11/13  -Consider nuplazid for mild hallucinations- hallucinations improving    -Neuropsych to see   -Rec therapy     #Orthostatic hypotension  -Monitor OH vital signs  -Currently on metoprolol and flomax - monitor for continued need   - no significant orthostasis for now     #Pain control  - Tylenol PRN    #GI/Bowel Management/Constipation  - Continue Senna at bedtime   - Miralax daily    #Bladder management/BPH  - Continue to monitor PVR q 8 hours (SC if > 400)  - Monitor UO  - Continue flomax - monitor OH    ----------------------------------------------------------  Concurrent medical problems    #TOYA  -cr 1.4  -NS bolus 500mL x1  - Encourage oral fluids     #Mildly elevated TSH   - T3, T4 added -pending  -TSH 4.05    #HTN  #CAD - s/p CABG and PCI in the past  -Continue metoprolol 100mg ER - monitor vitals  -EKG  -Niacin 500mg daily   -Crestor 10mg daily   -Continue aspirin 81mg daily     #bilateral lower extremity edema -with blister  - B/L lower extremity venous doppler (11/04/2024): negative for DVT  - s/p lasix 40mg po x 3 days as inpatient- monitor for need   - elevate legs daily  - ACE wrap for compression if needed  - Echo TTE (11/10):  moderate calcific aortic stenosis with mild aortic insufficiency, biatrial enlargement, left ventricular ejection fraction, by visual estimation, 55 to 60%, normal global left ventricular systolic function, moderate mitral annular calcification, trace mitral valve regurgitation, dilatation of the aortic root, Dimesionless Index value is .49.    #DVT Prophylaxis  - Lovenox  - TEDs   - LE duplex 11/4 - negative for DVT    #Skin:  -Left leg blister - elevate leg cleanse BID, apply bacitracin and cover with adaptik and telfa   -Offset pressure.     FEN   - Diet - DASH diet  - Dysphagia  SLP - evaluation and treatment    Precautions / PROPHYLAXIS:   - Falls, Cardiac  - ortho: Weight bearing status: WBAT   - Lungs: Aspiration  - Pressure injury/Skin:  OOB to Chair, PT/OT      Danilo Javier  Pulmonary Disease  [address] [place] [id number]  Phone: [phone]  Fax: [phone]  Follow Up Time: 2 weeks

## 2024-11-18 NOTE — PROGRESS NOTE ADULT - ASSESSMENT
74 year old male with PMH of HTN, HLD, CAD s/p PCI/CABG x 2, BPH, Parkinson's Disease who presented to [facility] on 11/6 from home for frequent falls and hallucinations. Falls have been increasing in frequency, now occurring 2-3x a week attributed to gait instability due to stiffness and feeling "frozen". The falls have led him to become more immobile leading to worsening LE swelling. Patient also endorses increase hallucination episodes (e.g. snakes in the house). Medical workup significant for TOYA, negative for infection and acute intracranial pathology. S/P lasix for b/l LE edema. He was subsequently referred to [facility] for acute inpatient rehab On 11/8/24.   TOYA  - resolved  - IVF stopped   - monitor urine output  - avoid nephrotoxic meds    #Parkinson's Disease  #Rigidity/Bradykinesia/falls  -Comprehensive Rehab Program  -Continue Sinemet 25/250 mg 1 tab at 7am, 11am, 3pm, and 7pm with dose of entacapone 200mg at same timing  -Continue Sinemet 25/100mg CR 1 tab at 12am   -Mirapex 1mg TID  -aricept 10mg daily   -melatonin PRN   -further medication changes per movement disorder specialist.   -fall, aspiration precautions    #Orthostatic hypotension  -Currently on metoprolol and flomax - monitor for continued need  -Monitor OH vital signs  #HTN  #CAD - s/p remote CABG and PCI  # HLD  -Continue metoprolol 100mg ER   -Niacin 500mg daily   -Crestor 10mg daily   -Continue aspirin 81mg daily   -monitor vitals    #BL lower extremity edema  - now resolved.  - B/L lower extremity venous doppler (11/04/2024): negative for DVT  - s/p lasix 40mg po x 3 days as inpatient - subsequent TOYA, would avoid further use for now and use conservative management  - elevate legs daily  - TTE: EF 50-55%. calcified AS, AR. Bi Atrial enlargement  - monitor    # Abnormal TSH  - TSh mildly elevated  - no symptoms  - repeat TFT in 4-6 weeks    # mild hyponatremia, resolved  - sodium 134-->138  - monitor    #Constipation  - Continue Senna at bedtime   - Miralax daily    #BPH  -monitor PVR q 8 hours (SC if > 400)  -Monitor UO  -Continue flomax    #DVT Prophylaxis  - Lovenox

## 2024-11-18 NOTE — PROGRESS NOTE ADULT - SUBJECTIVE AND OBJECTIVE BOX
SUBJECTIVE/ROS: Patient seen at bedside. He states that he is feeling well today and slept well last night. He was seen in therapy today boxing with the therapist and participating well. He denies chest pain, fever, chills, nausea, vomiting, abdominal pain, headache, or BLE pain.       HPI:  74 year old male with PMH of HTN, HLD, CAD s/p PCI/CABG x 2 (last 8 years ago), BPH, Parkinson's Disease (dx 8 years ago, follows with Dr. Claudio) who presented to [facility] on 11/6 from home for frequent falls and hallucinations. He has history of occasional falls with increased frequency over the last 1-2 weeks (2-3 times this week) attributed to gait instability due to stiffness and feeling "frozen". Due to these falls, he has been more immobile and over last weekend, family noted increased LE edema for which he takes PRN lasix. Patient also endorses increase hallucination episodes (e.g. snakes in the house, having boots on when he didn't). In the ED, he was afebrile and hemodynamically stable. CTH is negative for acute intracranial pathology. LE duplex negative for DVT. He was evaluated by PT, OT, and PMR and  recommended for inpatient acute rehab. Neurology consulted for medication optimization for Parkinson's disease. Pt given lasix for b/l LE edema. He was admitted to [facility] On 11/8/24.       Vital Signs Last 24 Hrs  T(C): 36.8 (18 Nov 2024 07:57), Max: 36.8 (18 Nov 2024 07:57)  T(F): 98.2 (18 Nov 2024 07:57), Max: 98.2 (18 Nov 2024 07:57)  HR: 58 (18 Nov 2024 07:57) (58 - 58)  BP: 115/73 (18 Nov 2024 07:57) (115/73 - 115/73)  BP(mean): --  RR: 16 (18 Nov 2024 07:57) (16 - 16)  SpO2: 98% (18 Nov 2024 07:57) (98% - 98%)    Parameters below as of 18 Nov 2024 07:57  Patient On (Oxygen Delivery Method): room air            PHYSICAL EXAM  Constitutional - NAD, Comfortable  HEENT - NCAT, EOMI  Neck - Supple, No limited ROM  Chest - Breathing comfortably in room air   Extremities - Mild bilateral LE edema, no calf tenderness   Neurologic Exam - ao to self, place, year,  sitting comfortably in the chair, no dyskinesias at time of visit, no tremor. Mild hypophonia. Mild rigidity. Moderate bradykinesia worse on the R.       LABS:                          13.9   8.50  )-----------( 141      ( 18 Nov 2024 05:39 )             41.2     11-18    138  |  103  |  35[H]  ----------------------------<  92  4.3   |  28  |  1.23    Ca    9.3      18 Nov 2024 05:39    TPro  7.1  /  Alb  3.6  /  TBili  1.0  /  DBili  x   /  AST  16  /  ALT  11  /  AlkPhos  74  11-18    LIVER FUNCTIONS - ( 18 Nov 2024 05:39 )  Alb: 3.6 g/dL / Pro: 7.1 g/dL / ALK PHOS: 74 U/L / ALT: 11 U/L / AST: 16 U/L / GGT: x                 MEDICATIONS  (STANDING):  aspirin enteric coated 81 milliGRAM(s) Oral at bedtime  bacitracin   Ointment 1 Application(s) Topical two times a day  carbidopa/levodopa  25/250 1 Tablet(s) Oral <User Schedule>  carbidopa/levodopa CR 25/100 1 Tablet(s) Oral <User Schedule>  clonazePAM Oral Disintegrating Tablet 0.25 milliGRAM(s) Oral at bedtime  donepezil 10 milliGRAM(s) Oral at bedtime  enoxaparin Injectable 40 milliGRAM(s) SubCutaneous every 24 hours  entacapone 200 milliGRAM(s) Oral <User Schedule>  metoprolol succinate  milliGRAM(s) Oral daily  niacin  milliGRAM(s) Oral at bedtime  polyethylene glycol 3350 17 Gram(s) Oral daily  pramipexole 0.5 milliGRAM(s) Oral three times a day  rosuvastatin 10 milliGRAM(s) Oral at bedtime  senna 2 Tablet(s) Oral at bedtime  tamsulosin 0.4 milliGRAM(s) Oral at bedtime    MEDICATIONS  (PRN):  acetaminophen     Tablet .. 650 milliGRAM(s) Oral every 6 hours PRN Mild Pain (1 - 3)  melatonin 3 milliGRAM(s) Oral at bedtime PRN Insomnia

## 2024-11-19 PROCEDURE — 99232 SBSQ HOSP IP/OBS MODERATE 35: CPT

## 2024-11-19 RX ADMIN — PRAMIPEXOLE 0.25 MILLIGRAM(S): 1.5 TABLET, EXTENDED RELEASE ORAL at 15:04

## 2024-11-19 RX ADMIN — ENTACAPONE 200 MILLIGRAM(S): 200 TABLET, FILM COATED ORAL at 15:03

## 2024-11-19 RX ADMIN — ENOXAPARIN SODIUM 40 MILLIGRAM(S): 30 INJECTION SUBCUTANEOUS at 06:43

## 2024-11-19 RX ADMIN — PRAMIPEXOLE 0.25 MILLIGRAM(S): 1.5 TABLET, EXTENDED RELEASE ORAL at 06:43

## 2024-11-19 RX ADMIN — ENTACAPONE 200 MILLIGRAM(S): 200 TABLET, FILM COATED ORAL at 11:05

## 2024-11-19 RX ADMIN — CARBIDOPA AND LEVODOPA 1 TABLET(S): 10; 100 TABLET ORAL at 15:03

## 2024-11-19 RX ADMIN — POLYETHYLENE GLYCOL 3350 17 GRAM(S): 17 POWDER, FOR SOLUTION ORAL at 12:19

## 2024-11-19 RX ADMIN — CARBIDOPA AND LEVODOPA 1 TABLET(S): 10; 100 TABLET ORAL at 06:43

## 2024-11-19 RX ADMIN — ROSUVASTATIN CALCIUM 10 MILLIGRAM(S): 5 TABLET, FILM COATED ORAL at 21:24

## 2024-11-19 RX ADMIN — ENTACAPONE 200 MILLIGRAM(S): 200 TABLET, FILM COATED ORAL at 18:55

## 2024-11-19 RX ADMIN — CLONAZEPAM 0.25 MILLIGRAM(S): 0.12 TABLET, ORALLY DISINTEGRATING ORAL at 21:26

## 2024-11-19 RX ADMIN — CARBIDOPA AND LEVODOPA 1 TABLET(S): 10; 100 TABLET ORAL at 11:05

## 2024-11-19 RX ADMIN — ENTACAPONE 200 MILLIGRAM(S): 200 TABLET, FILM COATED ORAL at 06:43

## 2024-11-19 RX ADMIN — Medication 81 MILLIGRAM(S): at 21:24

## 2024-11-19 RX ADMIN — Medication 1 APPLICATION(S): at 18:34

## 2024-11-19 RX ADMIN — Medication 1 APPLICATION(S): at 06:44

## 2024-11-19 RX ADMIN — METOPROLOL TARTRATE 100 MILLIGRAM(S): 100 TABLET, FILM COATED ORAL at 07:33

## 2024-11-19 RX ADMIN — Medication 2 TABLET(S): at 21:25

## 2024-11-19 RX ADMIN — CARBIDOPA AND LEVODOPA 1 TABLET(S): 10; 100 TABLET ORAL at 18:55

## 2024-11-19 RX ADMIN — TAMSULOSIN HYDROCHLORIDE 0.4 MILLIGRAM(S): 0.4 CAPSULE ORAL at 21:24

## 2024-11-19 RX ADMIN — PRAMIPEXOLE 0.25 MILLIGRAM(S): 1.5 TABLET, EXTENDED RELEASE ORAL at 21:24

## 2024-11-19 RX ADMIN — DONEPEZIL HYDROCHLORIDE 10 MILLIGRAM(S): 5 TABLET, FILM COATED ORAL at 21:24

## 2024-11-19 RX ADMIN — Medication 500 MILLIGRAM(S): at 21:25

## 2024-11-19 NOTE — PROGRESS NOTE ADULT - SUBJECTIVE AND OBJECTIVE BOX
SUBJECTIVE/ROS: Patient seen at bedside. He is participating well in therapy. Updates provided to wife and son at bedside. He denies chest pain, fever, chills, nausea, vomiting, abdominal pain, headache, or BLE pain.       HPI:  74 year old male with PMH of HTN, HLD, CAD s/p PCI/CABG x 2 (last 8 years ago), BPH, Parkinson's Disease (dx 8 years ago, follows with Dr. Claudio) who presented to [facility] on 11/6 from home for frequent falls and hallucinations. He has history of occasional falls with increased frequency over the last 1-2 weeks (2-3 times this week) attributed to gait instability due to stiffness and feeling "frozen". Due to these falls, he has been more immobile and over last weekend, family noted increased LE edema for which he takes PRN lasix. Patient also endorses increase hallucination episodes (e.g. snakes in the house, having boots on when he didn't). In the ED, he was afebrile and hemodynamically stable. CTH is negative for acute intracranial pathology. LE duplex negative for DVT. He was evaluated by PT, OT, and PMR and  recommended for inpatient acute rehab. Neurology consulted for medication optimization for Parkinson's disease. Pt given lasix for b/l LE edema. He was admitted to [facility] On 11/8/24.     Vital Signs Last 24 Hrs  T(C): 36.9 (19 Nov 2024 07:35), Max: 36.9 (19 Nov 2024 07:35)  T(F): 98.4 (19 Nov 2024 07:35), Max: 98.4 (19 Nov 2024 07:35)  HR: 56 (19 Nov 2024 07:35) (56 - 64)  BP: 133/76 (19 Nov 2024 07:35) (111/72 - 133/76)  BP(mean): --  RR: 16 (19 Nov 2024 07:35) (16 - 16)  SpO2: 97% (19 Nov 2024 07:35) (97% - 98%)    Parameters below as of 19 Nov 2024 07:35  Patient On (Oxygen Delivery Method): room air            PHYSICAL EXAM  Constitutional - NAD, Comfortable  HEENT - NCAT, EOMI  Neck - Supple, No limited ROM  Chest - Breathing comfortably in room air   Extremities - Mild bilateral LE edema, no calf tenderness   Neurologic Exam - ao to self, place, year,  sitting comfortably in the chair, no dyskinesias at time of visit, no tremor. Mild hypophonia. Mild rigidity. Moderate bradykinesia worse on the R.       LABS:                          13.9   8.50  )-----------( 141      ( 18 Nov 2024 05:39 )             41.2     11-18    138  |  103  |  35[H]  ----------------------------<  92  4.3   |  28  |  1.23    Ca    9.3      18 Nov 2024 05:39    TPro  7.1  /  Alb  3.6  /  TBili  1.0  /  DBili  x   /  AST  16  /  ALT  11  /  AlkPhos  74  11-18    LIVER FUNCTIONS - ( 18 Nov 2024 05:39 )  Alb: 3.6 g/dL / Pro: 7.1 g/dL / ALK PHOS: 74 U/L / ALT: 11 U/L / AST: 16 U/L / GGT: x                 MEDICATIONS  (STANDING):  aspirin enteric coated 81 milliGRAM(s) Oral at bedtime  bacitracin   Ointment 1 Application(s) Topical two times a day  carbidopa/levodopa  25/250 1 Tablet(s) Oral <User Schedule>  carbidopa/levodopa CR 25/100 1 Tablet(s) Oral <User Schedule>  clonazePAM Oral Disintegrating Tablet 0.25 milliGRAM(s) Oral at bedtime  donepezil 10 milliGRAM(s) Oral at bedtime  enoxaparin Injectable 40 milliGRAM(s) SubCutaneous every 24 hours  entacapone 200 milliGRAM(s) Oral <User Schedule>  metoprolol succinate  milliGRAM(s) Oral daily  niacin  milliGRAM(s) Oral at bedtime  polyethylene glycol 3350 17 Gram(s) Oral daily  pramipexole 0.5 milliGRAM(s) Oral three times a day  rosuvastatin 10 milliGRAM(s) Oral at bedtime  senna 2 Tablet(s) Oral at bedtime  tamsulosin 0.4 milliGRAM(s) Oral at bedtime    MEDICATIONS  (PRN):  acetaminophen     Tablet .. 650 milliGRAM(s) Oral every 6 hours PRN Mild Pain (1 - 3)  melatonin 3 milliGRAM(s) Oral at bedtime PRN Insomnia

## 2024-11-19 NOTE — PROGRESS NOTE ADULT - ASSESSMENT
74 year old male with PMH of HTN, HLD, CAD s/p PCI/CABG x 2 (last 8 years ago), BPH, Parkinson's Disease (dx 8 years ago, follows with Dr. Claudio) who presented to [facility] on 11/6 from home for frequent falls and hallucinations. He has history of occasional falls with increased frequency over the last 1-2 weeks (2-3 times this week) attributed to gait instability due to stiffness and feeling "frozen". Due to these falls, he has been more immobile and over last weekend, family noted increased LE edema for which he takes PRN lasix. Patient also endorses increase hallucination episodes (e.g. snakes in the house, having boots on when he didn't). In the ED, he was afebrile and hemodynamically stable. CTH is negative for acute intracranial pathology. LE duplex negative for DVT. He was evaluated by PT, OT, and PMR and  recommended for inpatient acute rehab. Neurology consulted for medication optimization for Parkinson's disease. Pt given lasix for b/l LE edema. He was admitted to [facility] On 11/8/24.     #Parkinson's Disease now with gait Instability, ADL impairments and Functional impairments  - Continue Comprehensive Rehab Program of PT/OT/SLP    ------------------------------------------------------  Parkinson's related motor symptoms    #Rigidity/Bradykinesia/falls   -Continue Sinemet 25/250 mg 1 tab at 7am, 11am, 3pm, and 7pm with dose of entacapone 200 mg at same timing - to monitor symptoms while titrating down pramipexole, potentially to consider more frequent lower dosing and/or opicapone   -Continue Sinemet 25/100mg CR 1 tab at 12am   -Mirapex reduce 0.25 mg in 2-3 days (11/18) , then 0.125 mg TID (around 11/21) and then to BID (around 11/23) and then to daily, then stop    -Follows with Dr. Claudio - recommend follow up with movement disorder neurology  -Movement disorders following    --------------------------------------------------------  Parkinson's related non-motor symptoms    #Mood/memory/sleep  -Continue aricept 10mg daily   -Continue melatonin PRN   -Continue clonazepam to 0.25mg at bedtime 11/13  -Consider nuplazid for mild hallucinations- hallucinations improving    -Neuropsych to see   -Rec therapy     #Orthostatic hypotension  -Monitor OH vital signs  -Currently on metoprolol and flomax - monitor for continued need   - no significant orthostasis for now     #Pain control  - Tylenol PRN    #GI/Bowel Management/Constipation  - Continue Senna at bedtime   - Miralax daily    #Bladder management/BPH  - Continue to monitor PVR q 8 hours (SC if > 400)  - Monitor UO  - Continue flomax - monitor OH    ----------------------------------------------------------  Concurrent medical problems    #TOYA - improved   -cr 1.4  -NS bolus 500mL x1  - Encourage oral fluids     #Mildly elevated TSH   - T3, T4 added -pending  -TSH 4.05    #HTN  #CAD - s/p CABG and PCI in the past  -Continue metoprolol 100mg ER - monitor vitals  -EKG  -Niacin 500mg daily   -Crestor 10mg daily   -Continue aspirin 81mg daily     #bilateral lower extremity edema -with blister improving   - B/L lower extremity venous doppler (11/04/2024): negative for DVT  - s/p lasix 40mg po x 3 days as inpatient- monitor for need   - elevate legs daily  - Echo TTE (11/10):  moderate calcific aortic stenosis with mild aortic insufficiency, biatrial enlargement, left ventricular ejection fraction, by visual estimation, 55 to 60%, normal global left ventricular systolic function, moderate mitral annular calcification, trace mitral valve regurgitation, dilatation of the aortic root, Dimesionless Index value is .49.    #DVT Prophylaxis  - Lovenox  - TEDs   - LE duplex 11/4 - negative for DVT    #Skin:  -Left leg blister - elevate leg cleanse BID, apply bacitracin and cover with adaptik and telfa   -Offset pressure.     FEN   - Diet - DASH diet  - Dysphagia  SLP - evaluation and treatment    Precautions / PROPHYLAXIS:   - Falls, Cardiac  - ortho: Weight bearing status: WBAT   - Lungs: Aspiration  - Pressure injury/Skin:  OOB to Chair, PT/OT      Danilo Javier  Pulmonary Disease  [address] [place] [id number]  Phone: [phone]  Fax: [phone]  Follow Up Time: 2 weeks

## 2024-11-19 NOTE — PROGRESS NOTE ADULT - ASSESSMENT
4 year old male with PMH of HTN, HLD, CAD s/p PCI/CABG x 2, BPH, Parkinson's Disease who presented to [facility] on 11/6 from home for frequent falls and hallucinations. Falls have been increasing in frequency, now occurring 2-3x a week attributed to gait instability due to stiffness and feeling "frozen". The falls have led him to become more immobile leading to worsening LE swelling. Patient also endorses increase hallucination episodes (e.g. snakes in the house). Medical workup significant for TOYA, negative for infection and acute intracranial pathology. S/P lasix for b/l LE edema. He was subsequently referred to [facility] for acute inpatient rehab On 11/8/24.   TOYA  - resolved  - IVF stopped   - monitor urine output  - avoid nephrotoxic meds    #Parkinson's Disease  #Rigidity/Bradykinesia/falls  -Comprehensive Rehab Program  -Continue Sinemet 25/250 mg 1 tab at 7am, 11am, 3pm, and 7pm with dose of entacapone 200mg at same timing  -Continue Sinemet 25/100mg CR 1 tab at 12am   -Mirapex 1mg TID  -aricept 10mg daily   -melatonin PRN   -further medication changes per movement disorder specialist.   -fall, aspiration precautions    #Orthostatic hypotension  -Currently on metoprolol and flomax - monitor for continued need  -Monitor OH vital signs  #HTN  #CAD - s/p remote CABG and PCI  # HLD  -Continue metoprolol 100mg ER   -Niacin 500mg daily   -Crestor 10mg daily   -Continue aspirin 81mg daily   -monitor vitals    #BL lower extremity edema  - now resolved.  - B/L lower extremity venous doppler (11/04/2024): negative for DVT  - s/p lasix 40mg po x 3 days as inpatient - subsequent TOYA, would avoid further use for now and use conservative management  - elevate legs daily  - TTE: EF 50-55%. calcified AS, AR. Bi Atrial enlargement  - monitor    # Abnormal TSH  - TSh mildly elevated  - no symptoms  - repeat TFT in 4-6 weeks    # mild hyponatremia, resolved  - sodium 134-->138  - monitor    #Constipation  - Continue Senna at bedtime   - Miralax daily    #BPH  -monitor PVR q 8 hours (SC if > 400)  -Monitor UO  -Continue flomax    #DVT Prophylaxis  - Lovenox

## 2024-11-19 NOTE — PROGRESS NOTE ADULT - SUBJECTIVE AND OBJECTIVE BOX
PROGRESS NOTE:     Patient is a 74y old  Male who presents with a chief complaint of Parkinson's (18 Nov 2024 12:46)      SUBJECTIVE / OVERNIGHT EVENTS: pt was seen and evaluated today  no complains offered    ADDITIONAL REVIEW OF SYSTEMS: as per HPI    MEDICATIONS  (STANDING):  aspirin enteric coated 81 milliGRAM(s) Oral at bedtime  bacitracin   Ointment 1 Application(s) Topical two times a day  carbidopa/levodopa  25/250 1 Tablet(s) Oral <User Schedule>  carbidopa/levodopa CR 25/100 1 Tablet(s) Oral <User Schedule>  clonazePAM Oral Disintegrating Tablet 0.25 milliGRAM(s) Oral at bedtime  donepezil 10 milliGRAM(s) Oral at bedtime  enoxaparin Injectable 40 milliGRAM(s) SubCutaneous every 24 hours  entacapone 200 milliGRAM(s) Oral <User Schedule>  metoprolol succinate  milliGRAM(s) Oral daily  niacin  milliGRAM(s) Oral at bedtime  polyethylene glycol 3350 17 Gram(s) Oral daily  pramipexole 0.25 milliGRAM(s) Oral three times a day  rosuvastatin 10 milliGRAM(s) Oral at bedtime  senna 2 Tablet(s) Oral at bedtime  tamsulosin 0.4 milliGRAM(s) Oral at bedtime    MEDICATIONS  (PRN):  acetaminophen     Tablet .. 650 milliGRAM(s) Oral every 6 hours PRN Mild Pain (1 - 3)  melatonin 3 milliGRAM(s) Oral at bedtime PRN Insomnia      CAPILLARY BLOOD GLUCOSE        I&O's Summary    18 Nov 2024 07:01  -  19 Nov 2024 07:00  --------------------------------------------------------  IN: 0 mL / OUT: 1000 mL / NET: -1000 mL        PHYSICAL EXAM:  Vital Signs Last 24 Hrs  T(C): 36.9 (19 Nov 2024 07:35), Max: 36.9 (19 Nov 2024 07:35)  T(F): 98.4 (19 Nov 2024 07:35), Max: 98.4 (19 Nov 2024 07:35)  HR: 56 (19 Nov 2024 07:35) (56 - 64)  BP: 133/76 (19 Nov 2024 07:35) (111/72 - 133/76)  BP(mean): --  RR: 16 (19 Nov 2024 07:35) (16 - 16)  SpO2: 97% (19 Nov 2024 07:35) (97% - 98%)    Parameters below as of 19 Nov 2024 07:35  Patient On (Oxygen Delivery Method): room air      PHYSICAL EXAM:  Gen: nad, resting in bed  Neuro: awake and alert   Heent: eomi b/l, no jvd, no oral exudates  Pulm: cta b/l, no w/r/r  CV: +s1s2, no m/r/g  Ab: soft, nt/nd, normoactive bs x 4  Extrem: no edema, pulses intact and equal  Skin: no rashes  Psych: normal    LABS:                        13.9   8.50  )-----------( 141      ( 18 Nov 2024 05:39 )             41.2     11-18    138  |  103  |  35[H]  ----------------------------<  92  4.3   |  28  |  1.23    Ca    9.3      18 Nov 2024 05:39    TPro  7.1  /  Alb  3.6  /  TBili  1.0  /  DBili  x   /  AST  16  /  ALT  11  /  AlkPhos  74  11-18          Urinalysis Basic - ( 18 Nov 2024 05:39 )    Color: x / Appearance: x / SG: x / pH: x  Gluc: 92 mg/dL / Ketone: x  / Bili: x / Urobili: x   Blood: x / Protein: x / Nitrite: x   Leuk Esterase: x / RBC: x / WBC x   Sq Epi: x / Non Sq Epi: x / Bacteria: x        case discussed during IDR

## 2024-11-20 ENCOUNTER — TRANSCRIPTION ENCOUNTER (OUTPATIENT)
Age: 74
End: 2024-11-20

## 2024-11-20 PROCEDURE — 99232 SBSQ HOSP IP/OBS MODERATE 35: CPT

## 2024-11-20 RX ADMIN — CARBIDOPA AND LEVODOPA 1 TABLET(S): 10; 100 TABLET ORAL at 14:42

## 2024-11-20 RX ADMIN — CARBIDOPA AND LEVODOPA 1 TABLET(S): 10; 100 TABLET ORAL at 18:38

## 2024-11-20 RX ADMIN — PRAMIPEXOLE 0.25 MILLIGRAM(S): 1.5 TABLET, EXTENDED RELEASE ORAL at 14:42

## 2024-11-20 RX ADMIN — PRAMIPEXOLE 0.25 MILLIGRAM(S): 1.5 TABLET, EXTENDED RELEASE ORAL at 06:57

## 2024-11-20 RX ADMIN — POLYETHYLENE GLYCOL 3350 17 GRAM(S): 17 POWDER, FOR SOLUTION ORAL at 13:19

## 2024-11-20 RX ADMIN — METOPROLOL TARTRATE 100 MILLIGRAM(S): 100 TABLET, FILM COATED ORAL at 06:57

## 2024-11-20 RX ADMIN — CARBIDOPA AND LEVODOPA 1 TABLET(S): 10; 100 TABLET ORAL at 06:57

## 2024-11-20 RX ADMIN — TAMSULOSIN HYDROCHLORIDE 0.4 MILLIGRAM(S): 0.4 CAPSULE ORAL at 20:14

## 2024-11-20 RX ADMIN — ENTACAPONE 200 MILLIGRAM(S): 200 TABLET, FILM COATED ORAL at 06:58

## 2024-11-20 RX ADMIN — ROSUVASTATIN CALCIUM 10 MILLIGRAM(S): 5 TABLET, FILM COATED ORAL at 20:13

## 2024-11-20 RX ADMIN — Medication 2 TABLET(S): at 20:13

## 2024-11-20 RX ADMIN — DONEPEZIL HYDROCHLORIDE 10 MILLIGRAM(S): 5 TABLET, FILM COATED ORAL at 20:14

## 2024-11-20 RX ADMIN — Medication 81 MILLIGRAM(S): at 20:13

## 2024-11-20 RX ADMIN — Medication 1 APPLICATION(S): at 06:57

## 2024-11-20 RX ADMIN — ENTACAPONE 200 MILLIGRAM(S): 200 TABLET, FILM COATED ORAL at 10:46

## 2024-11-20 RX ADMIN — CARBIDOPA AND LEVODOPA 1 TABLET(S): 10; 100 TABLET ORAL at 10:46

## 2024-11-20 RX ADMIN — CLONAZEPAM 0.25 MILLIGRAM(S): 0.12 TABLET, ORALLY DISINTEGRATING ORAL at 20:13

## 2024-11-20 RX ADMIN — ENTACAPONE 200 MILLIGRAM(S): 200 TABLET, FILM COATED ORAL at 18:38

## 2024-11-20 RX ADMIN — ENTACAPONE 200 MILLIGRAM(S): 200 TABLET, FILM COATED ORAL at 14:42

## 2024-11-20 RX ADMIN — PRAMIPEXOLE 0.25 MILLIGRAM(S): 1.5 TABLET, EXTENDED RELEASE ORAL at 20:13

## 2024-11-20 RX ADMIN — ENOXAPARIN SODIUM 40 MILLIGRAM(S): 30 INJECTION SUBCUTANEOUS at 06:58

## 2024-11-20 RX ADMIN — ACETAMINOPHEN, DIPHENHYDRAMINE HCL, PHENYLEPHRINE HCL 3 MILLIGRAM(S): 325; 25; 5 TABLET ORAL at 20:13

## 2024-11-20 RX ADMIN — CARBIDOPA AND LEVODOPA 1 TABLET(S): 10; 100 TABLET ORAL at 00:47

## 2024-11-20 RX ADMIN — Medication 500 MILLIGRAM(S): at 20:13

## 2024-11-20 NOTE — PROGRESS NOTE ADULT - ASSESSMENT
74 year old male with PMH of HTN, HLD, CAD s/p PCI/CABG x 2, BPH, Parkinson's Disease who presented to [facility] on 11/6 from home for frequent falls and hallucinations. Falls have been increasing in frequency, now occurring 2-3x a week attributed to gait instability due to stiffness and feeling "frozen". The falls have led him to become more immobile leading to worsening LE swelling. Patient also endorses increase hallucination episodes (e.g. snakes in the house). Medical workup significant for TOYA, negative for infection and acute intracranial pathology. S/P lasix for b/l LE edema. He was subsequently referred to [facility] for acute inpatient rehab On 11/8/24.   TOYA  - resolved  - IVF stopped   - monitor urine output  - avoid nephrotoxic meds    #Parkinson's Disease  #Rigidity/Bradykinesia/falls  -Comprehensive Rehab Program  -Continue Sinemet 25/250 mg 1 tab at 7am, 11am, 3pm, and 7pm with dose of entacapone 200mg at same timing  -Continue Sinemet 25/100mg CR 1 tab at 12am   -Mirapex dosing as per rehab  -aricept 10mg daily   -melatonin PRN   -further medication changes per movement disorder specialist.   -fall, aspiration precautions    #Orthostatic hypotension  -Currently on metoprolol and flomax - monitor for continued need  -Monitor OH vital signs  #HTN  #CAD - s/p remote CABG and PCI  # HLD  -Continue metoprolol 100mg ER   -Niacin 500mg daily   -Crestor 10mg daily   -Continue aspirin 81mg daily   -monitor vitals    #BL lower extremity edema  - now resolved.  - B/L lower extremity venous doppler (11/04/2024): negative for DVT  - s/p lasix 40mg po x 3 days as inpatient - subsequent TOYA, would avoid further use for now and use conservative management  - elevate legs daily  - TTE: EF 50-55%. calcified AS, AR. Bi Atrial enlargement  - monitor    # Abnormal TSH  - TSh mildly elevated  - no symptoms  - repeat TFT in 4-6 weeks    # mild hyponatremia, resolved  - sodium 134-->138  - monitor    #Constipation  - Continue Senna at bedtime   - Miralax daily    #BPH  -monitor PVR q 8 hours (SC if > 400)  -Monitor UO  -Continue flomax    #DVT Prophylaxis  - Lovenox

## 2024-11-20 NOTE — PROGRESS NOTE ADULT - SUBJECTIVE AND OBJECTIVE BOX
PROGRESS NOTE:     Patient is a 74y old  Male who presents with a chief complaint of Parkinson's (19 Nov 2024 13:32)      SUBJECTIVE / OVERNIGHT EVENTS: pt was seen and evaluated today  no complains offered    ADDITIONAL REVIEW OF SYSTEMS: as pre HPI    MEDICATIONS  (STANDING):  aspirin enteric coated 81 milliGRAM(s) Oral at bedtime  bacitracin   Ointment 1 Application(s) Topical two times a day  carbidopa/levodopa  25/250 1 Tablet(s) Oral <User Schedule>  carbidopa/levodopa CR 25/100 1 Tablet(s) Oral <User Schedule>  clonazePAM Oral Disintegrating Tablet 0.25 milliGRAM(s) Oral at bedtime  donepezil 10 milliGRAM(s) Oral at bedtime  enoxaparin Injectable 40 milliGRAM(s) SubCutaneous every 24 hours  entacapone 200 milliGRAM(s) Oral <User Schedule>  metoprolol succinate  milliGRAM(s) Oral daily  niacin  milliGRAM(s) Oral at bedtime  polyethylene glycol 3350 17 Gram(s) Oral daily  pramipexole 0.25 milliGRAM(s) Oral three times a day  rosuvastatin 10 milliGRAM(s) Oral at bedtime  senna 2 Tablet(s) Oral at bedtime  tamsulosin 0.4 milliGRAM(s) Oral at bedtime    MEDICATIONS  (PRN):  acetaminophen     Tablet .. 650 milliGRAM(s) Oral every 6 hours PRN Mild Pain (1 - 3)  melatonin 3 milliGRAM(s) Oral at bedtime PRN Insomnia      CAPILLARY BLOOD GLUCOSE        I&O's Summary    19 Nov 2024 07:01  -  20 Nov 2024 07:00  --------------------------------------------------------  IN: 0 mL / OUT: 200 mL / NET: -200 mL        PHYSICAL EXAM:  Vital Signs Last 24 Hrs  T(C): 36.9 (20 Nov 2024 08:02), Max: 37.1 (19 Nov 2024 21:09)  T(F): 98.4 (20 Nov 2024 08:02), Max: 98.7 (19 Nov 2024 21:09)  HR: 60 (20 Nov 2024 08:02) (60 - 60)  BP: 155/74 (20 Nov 2024 08:02) (155/74 - 155/74)  BP(mean): --  RR: 16 (20 Nov 2024 08:02) (15 - 18)  SpO2: 100% (20 Nov 2024 08:02) (97% - 100%)    Parameters below as of 20 Nov 2024 08:02  Patient On (Oxygen Delivery Method): room air      PHYSICAL EXAM:  Gen: nad, resting in bed  Neuro: awake and alert   Heent: eomi b/l, no jvd, no oral exudates  Pulm: cta b/l, no w/r/r  CV: +s1s2, no m/r/g  Ab: soft, nt/nd, normoactive bs x 4  Extrem: no edema, pulses intact and equal  Skin: no rashes  Psych: normal    LABS:  n/a    case discussed during IDR

## 2024-11-20 NOTE — DISCHARGE NOTE NURSING/CASE MANAGEMENT/SOCIAL WORK - FINANCIAL ASSISTANCE
[facility] provides services at a reduced cost to those who are determined to be eligible through [facility]’s financial assistance program. Information regarding [facility]’s financial assistance program can be found by going to [email or web address] or by calling [phone].

## 2024-11-20 NOTE — DISCHARGE NOTE NURSING/CASE MANAGEMENT/SOCIAL WORK - PATIENT PORTAL LINK FT
You can access the FollowMyHealth Patient Portal offered by [facility] by registering at the following website: [email or web address]. By joining [employer]’s FollowMyHealth portal, you will also be able to view your health information using other applications (apps) compatible with our system.

## 2024-11-20 NOTE — PROGRESS NOTE ADULT - SUBJECTIVE AND OBJECTIVE BOX
SUBJECTIVE/ROS: Patient seen at bedside. HHe has no new complaints and is participating well in therapy.  He denies chest pain, fever, chills, nausea, vomiting, abdominal pain, headache, or BLE pain.       HPI:  74 year old male with PMH of HTN, HLD, CAD s/p PCI/CABG x 2 (last 8 years ago), BPH, Parkinson's Disease (dx 8 years ago, follows with Dr. Claudio) who presented to [facility] on 11/6 from home for frequent falls and hallucinations. He has history of occasional falls with increased frequency over the last 1-2 weeks (2-3 times this week) attributed to gait instability due to stiffness and feeling "frozen". Due to these falls, he has been more immobile and over last weekend, family noted increased LE edema for which he takes PRN lasix. Patient also endorses increase hallucination episodes (e.g. snakes in the house, having boots on when he didn't). In the ED, he was afebrile and hemodynamically stable. CTH is negative for acute intracranial pathology. LE duplex negative for DVT. He was evaluated by PT, OT, and PMR and  recommended for inpatient acute rehab. Neurology consulted for medication optimization for Parkinson's disease. Pt given lasix for b/l LE edema. He was admitted to [facility] On 11/8/24.       Vital Signs Last 24 Hrs  T(C): 36.9 (20 Nov 2024 08:02), Max: 37.1 (19 Nov 2024 21:09)  T(F): 98.4 (20 Nov 2024 08:02), Max: 98.7 (19 Nov 2024 21:09)  HR: 60 (20 Nov 2024 08:02) (60 - 60)  BP: 155/74 (20 Nov 2024 08:02) (155/74 - 155/74)  BP(mean): --  RR: 16 (20 Nov 2024 08:02) (15 - 18)  SpO2: 100% (20 Nov 2024 08:02) (97% - 100%)    Parameters below as of 20 Nov 2024 08:02  Patient On (Oxygen Delivery Method): room air            PHYSICAL EXAM  Constitutional - NAD, Comfortable  HEENT - NCAT, EOMI  Neck - Supple, No limited ROM  Chest - Breathing comfortably in room air   Extremities - Mild bilateral LE edema, no calf tenderness   Neurologic Exam - ao to self, place, year,  sitting comfortably in the chair, no dyskinesias at time of visit, no tremor. Mild hypophonia. Mild rigidity. Moderate bradykinesia worse on the R.       LABS:                          13.9   8.50  )-----------( 141      ( 18 Nov 2024 05:39 )             41.2     11-18    138  |  103  |  35[H]  ----------------------------<  92  4.3   |  28  |  1.23    Ca    9.3      18 Nov 2024 05:39    TPro  7.1  /  Alb  3.6  /  TBili  1.0  /  DBili  x   /  AST  16  /  ALT  11  /  AlkPhos  74  11-18    LIVER FUNCTIONS - ( 18 Nov 2024 05:39 )  Alb: 3.6 g/dL / Pro: 7.1 g/dL / ALK PHOS: 74 U/L / ALT: 11 U/L / AST: 16 U/L / GGT: x                 MEDICATIONS  (STANDING):  aspirin enteric coated 81 milliGRAM(s) Oral at bedtime  bacitracin   Ointment 1 Application(s) Topical two times a day  carbidopa/levodopa  25/250 1 Tablet(s) Oral <User Schedule>  carbidopa/levodopa CR 25/100 1 Tablet(s) Oral <User Schedule>  clonazePAM Oral Disintegrating Tablet 0.25 milliGRAM(s) Oral at bedtime  donepezil 10 milliGRAM(s) Oral at bedtime  enoxaparin Injectable 40 milliGRAM(s) SubCutaneous every 24 hours  entacapone 200 milliGRAM(s) Oral <User Schedule>  metoprolol succinate  milliGRAM(s) Oral daily  niacin  milliGRAM(s) Oral at bedtime  polyethylene glycol 3350 17 Gram(s) Oral daily  pramipexole 0.5 milliGRAM(s) Oral three times a day  rosuvastatin 10 milliGRAM(s) Oral at bedtime  senna 2 Tablet(s) Oral at bedtime  tamsulosin 0.4 milliGRAM(s) Oral at bedtime    MEDICATIONS  (PRN):  acetaminophen     Tablet .. 650 milliGRAM(s) Oral every 6 hours PRN Mild Pain (1 - 3)  melatonin 3 milliGRAM(s) Oral at bedtime PRN Insomnia

## 2024-11-20 NOTE — PROGRESS NOTE ADULT - ASSESSMENT
74 year old male with PMH of HTN, HLD, CAD s/p PCI/CABG x 2 (last 8 years ago), BPH, Parkinson's Disease (dx 8 years ago, follows with Dr. Caludio) who presented to [facility] on 11/6 from home for frequent falls and hallucinations. He has history of occasional falls with increased frequency over the last 1-2 weeks (2-3 times this week) attributed to gait instability due to stiffness and feeling "frozen". Due to these falls, he has been more immobile and over last weekend, family noted increased LE edema for which he takes PRN lasix. Patient also endorses increase hallucination episodes (e.g. snakes in the house, having boots on when he didn't). In the ED, he was afebrile and hemodynamically stable. CTH is negative for acute intracranial pathology. LE duplex negative for DVT. He was evaluated by PT, OT, and PMR and  recommended for inpatient acute rehab. Neurology consulted for medication optimization for Parkinson's disease. Pt given lasix for b/l LE edema. He was admitted to [facility] On 11/8/24.     #Parkinson's Disease now with gait Instability, ADL impairments and Functional impairments  - Continue Comprehensive Rehab Program of PT/OT/SLP    ------------------------------------------------------  Parkinson's related motor symptoms    #Rigidity/Bradykinesia/falls   -Continue Sinemet 25/250 mg 1 tab at 7am, 11am, 3pm, and 7pm with dose of entacapone 200 mg at same timing - to monitor symptoms while titrating down pramipexole, potentially to consider more frequent lower dosing and/or opicapone   -Continue Sinemet 25/100mg CR 1 tab at 12am   -Mirapex reduce 0.25 mg in 2-3 days (11/18) , then 0.125 mg TID (around 11/21) and then to BID (around 11/23) and then to daily, then stop    -Follows with Dr. Claudio - recommend follow up with movement disorder neurology  -Movement disorders following    --------------------------------------------------------  Parkinson's related non-motor symptoms    #Mood/memory/sleep  -Continue aricept 10mg daily   -Continue melatonin PRN   -Continue clonazepam to 0.25mg at bedtime 11/13  -Consider nuplazid for mild hallucinations- hallucinations improving    -Neuropsych to see   -Rec therapy     #Orthostatic hypotension  -Monitor OH vital signs  -Currently on metoprolol and flomax - monitor for continued need   - no significant orthostasis for now     #Pain control  - Tylenol PRN    #GI/Bowel Management/Constipation  - Continue Senna at bedtime   - Miralax daily    #Bladder management/BPH  - Continue to monitor PVR q 8 hours (SC if > 400)  - Monitor UO  - Continue flomax - monitor OH    ----------------------------------------------------------  Concurrent medical problems    #TOYA - improved   -cr 1.4 -- 1.23 11/18  -NS bolus 500mL x1  - Encourage oral fluids     #Mildly elevated TSH   - T3, T4 added -stable   -TSH 4.05    #HTN  #CAD - s/p CABG and PCI in the past  -Continue metoprolol 100mg ER - monitor vitals  -EKG  -Niacin 500mg daily   -Crestor 10mg daily   -Continue aspirin 81mg daily     #bilateral lower extremity edema -with blister improving   - B/L lower extremity venous doppler (11/04/2024): negative for DVT  - s/p lasix 40mg po x 3 days as inpatient- monitor for need   - elevate legs daily  - Echo TTE (11/10):  moderate calcific aortic stenosis with mild aortic insufficiency, biatrial enlargement, left ventricular ejection fraction, by visual estimation, 55 to 60%, normal global left ventricular systolic function, moderate mitral annular calcification, trace mitral valve regurgitation, dilatation of the aortic root, Dimesionless Index value is .49.    #DVT Prophylaxis  - Lovenox  - TEDs   - LE duplex 11/4 - negative for DVT    #Skin:  -Left leg blister - elevate leg cleanse BID, apply bacitracin and cover with adaptik and telfa   -Offset pressure.     FEN   - Diet - DASH diet  - Dysphagia  SLP - evaluation and treatment    Precautions / PROPHYLAXIS:   - Falls, Cardiac  - ortho: Weight bearing status: WBAT   - Lungs: Aspiration  - Pressure injury/Skin:  OOB to Chair, PT/OT      Danilo Javier  Pulmonary Disease  [address] [place] [id number]  Phone: [phone]  Fax: [phone]  Follow Up Time: 2 weeks

## 2024-11-21 ENCOUNTER — TRANSCRIPTION ENCOUNTER (OUTPATIENT)
Age: 74
End: 2024-11-21

## 2024-11-21 LAB
ALBUMIN SERPL ELPH-MCNC: 3.5 G/DL — SIGNIFICANT CHANGE UP (ref 3.3–5)
ALP SERPL-CCNC: 70 U/L — SIGNIFICANT CHANGE UP (ref 40–120)
ALT FLD-CCNC: 11 U/L — SIGNIFICANT CHANGE UP (ref 10–45)
ANION GAP SERPL CALC-SCNC: 8 MMOL/L — SIGNIFICANT CHANGE UP (ref 5–17)
AST SERPL-CCNC: 16 U/L — SIGNIFICANT CHANGE UP (ref 10–40)
BASOPHILS # BLD AUTO: 0.07 K/UL — SIGNIFICANT CHANGE UP (ref 0–0.2)
BASOPHILS NFR BLD AUTO: 0.9 % — SIGNIFICANT CHANGE UP (ref 0–2)
BILIRUB SERPL-MCNC: 1 MG/DL — SIGNIFICANT CHANGE UP (ref 0.2–1.2)
BUN SERPL-MCNC: 30 MG/DL — HIGH (ref 7–23)
CALCIUM SERPL-MCNC: 9.5 MG/DL — SIGNIFICANT CHANGE UP (ref 8.4–10.5)
CHLORIDE SERPL-SCNC: 107 MMOL/L — SIGNIFICANT CHANGE UP (ref 96–108)
CO2 SERPL-SCNC: 26 MMOL/L — SIGNIFICANT CHANGE UP (ref 22–31)
CREAT SERPL-MCNC: 1.16 MG/DL — SIGNIFICANT CHANGE UP (ref 0.5–1.3)
EGFR: 65 ML/MIN/1.73M2 — SIGNIFICANT CHANGE UP
EOSINOPHIL # BLD AUTO: 0.15 K/UL — SIGNIFICANT CHANGE UP (ref 0–0.5)
EOSINOPHIL NFR BLD AUTO: 1.9 % — SIGNIFICANT CHANGE UP (ref 0–6)
GLUCOSE SERPL-MCNC: 101 MG/DL — HIGH (ref 70–99)
HCT VFR BLD CALC: 39.1 % — SIGNIFICANT CHANGE UP (ref 39–50)
HGB BLD-MCNC: 13.3 G/DL — SIGNIFICANT CHANGE UP (ref 13–17)
IMM GRANULOCYTES NFR BLD AUTO: 0.4 % — SIGNIFICANT CHANGE UP (ref 0–0.9)
LYMPHOCYTES # BLD AUTO: 1.92 K/UL — SIGNIFICANT CHANGE UP (ref 1–3.3)
LYMPHOCYTES # BLD AUTO: 24.1 % — SIGNIFICANT CHANGE UP (ref 13–44)
MCHC RBC-ENTMCNC: 28.7 PG — SIGNIFICANT CHANGE UP (ref 27–34)
MCHC RBC-ENTMCNC: 34 G/DL — SIGNIFICANT CHANGE UP (ref 32–36)
MCV RBC AUTO: 84.4 FL — SIGNIFICANT CHANGE UP (ref 80–100)
MONOCYTES # BLD AUTO: 0.83 K/UL — SIGNIFICANT CHANGE UP (ref 0–0.9)
MONOCYTES NFR BLD AUTO: 10.4 % — SIGNIFICANT CHANGE UP (ref 2–14)
NEUTROPHILS # BLD AUTO: 4.98 K/UL — SIGNIFICANT CHANGE UP (ref 1.8–7.4)
NEUTROPHILS NFR BLD AUTO: 62.3 % — SIGNIFICANT CHANGE UP (ref 43–77)
NRBC # BLD: 0 /100 WBCS — SIGNIFICANT CHANGE UP (ref 0–0)
PLATELET # BLD AUTO: 148 K/UL — LOW (ref 150–400)
POTASSIUM SERPL-MCNC: 4.4 MMOL/L — SIGNIFICANT CHANGE UP (ref 3.5–5.3)
POTASSIUM SERPL-SCNC: 4.4 MMOL/L — SIGNIFICANT CHANGE UP (ref 3.5–5.3)
PROT SERPL-MCNC: 7.1 G/DL — SIGNIFICANT CHANGE UP (ref 6–8.3)
RBC # BLD: 4.63 M/UL — SIGNIFICANT CHANGE UP (ref 4.2–5.8)
RBC # FLD: 15.4 % — HIGH (ref 10.3–14.5)
SODIUM SERPL-SCNC: 141 MMOL/L — SIGNIFICANT CHANGE UP (ref 135–145)
WBC # BLD: 7.98 K/UL — SIGNIFICANT CHANGE UP (ref 3.8–10.5)
WBC # FLD AUTO: 7.98 K/UL — SIGNIFICANT CHANGE UP (ref 3.8–10.5)

## 2024-11-21 PROCEDURE — 99232 SBSQ HOSP IP/OBS MODERATE 35: CPT

## 2024-11-21 RX ORDER — CARBIDOPA/LEVODOPA 10MG-100MG
1 TABLET ORAL
Refills: 0 | DISCHARGE

## 2024-11-21 RX ORDER — PRAMIPEXOLE DIHYDROCHLORIDE 0.12 MG/1
1 TABLET ORAL
Refills: 0 | DISCHARGE

## 2024-11-21 RX ORDER — METOPROLOL TARTRATE 100 MG/1
1 TABLET, FILM COATED ORAL
Qty: 30 | Refills: 0
Start: 2024-11-21 | End: 2024-12-20

## 2024-11-21 RX ORDER — METOPROLOL TARTRATE 50 MG
1 TABLET ORAL
Refills: 0 | DISCHARGE

## 2024-11-21 RX ORDER — PRAMIPEXOLE 1.5 MG/1
1 TABLET, EXTENDED RELEASE ORAL
Qty: 7 | Refills: 0
Start: 2024-11-21 | End: 2024-11-27

## 2024-11-21 RX ORDER — DONEPEZIL HYDROCHLORIDE 5 MG/1
1 TABLET, FILM COATED ORAL
Qty: 30 | Refills: 0
Start: 2024-11-21 | End: 2024-12-20

## 2024-11-21 RX ORDER — ACETAMINOPHEN, DIPHENHYDRAMINE HCL, PHENYLEPHRINE HCL 325; 25; 5 MG/1; MG/1; MG/1
1 TABLET ORAL
Qty: 30 | Refills: 0
Start: 2024-11-21 | End: 2024-12-20

## 2024-11-21 RX ORDER — POLYETHYLENE GLYCOL 3350 17 G/17G
17 POWDER, FOR SOLUTION ORAL
Qty: 510 | Refills: 0
Start: 2024-11-21 | End: 2024-12-20

## 2024-11-21 RX ORDER — CARBIDOPA AND LEVODOPA 10; 100 MG/1; MG/1
1 TABLET ORAL
Qty: 30 | Refills: 0
Start: 2024-11-21 | End: 2024-12-20

## 2024-11-21 RX ORDER — DONEPEZIL HYDROCHLORIDE 23 MG/1
1 TABLET ORAL
Refills: 0 | DISCHARGE

## 2024-11-21 RX ORDER — TAMSULOSIN HCL 0.4 MG
1 CAPSULE ORAL
Refills: 0 | DISCHARGE

## 2024-11-21 RX ORDER — ENTACAPONE 200 MG/1
1 TABLET, FILM COATED ORAL
Refills: 0 | DISCHARGE

## 2024-11-21 RX ORDER — PRAMIPEXOLE 1.5 MG/1
0.12 TABLET, EXTENDED RELEASE ORAL THREE TIMES A DAY
Refills: 0 | Status: DISCONTINUED | OUTPATIENT
Start: 2024-11-21 | End: 2024-11-22

## 2024-11-21 RX ORDER — ACETAMINOPHEN, DIPHENHYDRAMINE HCL, PHENYLEPHRINE HCL 325; 25; 5 MG/1; MG/1; MG/1
3 TABLET ORAL AT BEDTIME
Refills: 0 | Status: DISCONTINUED | OUTPATIENT
Start: 2024-11-21 | End: 2024-11-22

## 2024-11-21 RX ORDER — CLONAZEPAM 0.12 MG/1
1 TABLET, ORALLY DISINTEGRATING ORAL
Qty: 30 | Refills: 0
Start: 2024-11-21 | End: 2024-12-20

## 2024-11-21 RX ORDER — ASPIRIN/MAG CARB/ALUMINUM AMIN 325 MG
0 TABLET ORAL
Refills: 0 | DISCHARGE

## 2024-11-21 RX ORDER — PRAMIPEXOLE 1.5 MG/1
1 TABLET, EXTENDED RELEASE ORAL
Qty: 14 | Refills: 0
Start: 2024-11-21 | End: 2024-11-27

## 2024-11-21 RX ORDER — FUROSEMIDE 40 MG
1 TABLET ORAL
Refills: 0 | DISCHARGE

## 2024-11-21 RX ORDER — BACITRACIN ZINC 500 UNIT/G
1 OINTMENT (GRAM) TOPICAL
Qty: 1 | Refills: 0
Start: 2024-11-21 | End: 2024-12-20

## 2024-11-21 RX ORDER — CARBIDOPA AND LEVODOPA 10; 100 MG/1; MG/1
1 TABLET ORAL
Qty: 120 | Refills: 0
Start: 2024-11-21 | End: 2024-12-20

## 2024-11-21 RX ORDER — TAMSULOSIN HYDROCHLORIDE 0.4 MG/1
1 CAPSULE ORAL
Qty: 30 | Refills: 0
Start: 2024-11-21 | End: 2024-12-20

## 2024-11-21 RX ORDER — PRAMIPEXOLE 1.5 MG/1
1 TABLET, EXTENDED RELEASE ORAL
Qty: 21 | Refills: 0
Start: 2024-11-21 | End: 2024-11-27

## 2024-11-21 RX ORDER — ACETAMINOPHEN 500MG 500 MG/1
2 TABLET, COATED ORAL
Qty: 0 | Refills: 0 | DISCHARGE
Start: 2024-11-21

## 2024-11-21 RX ORDER — ROSUVASTATIN CALCIUM 5 MG/1
1 TABLET, FILM COATED ORAL
Qty: 30 | Refills: 0
Start: 2024-11-21 | End: 2024-12-20

## 2024-11-21 RX ORDER — SENNOSIDES 8.6 MG
2 TABLET ORAL
Qty: 60 | Refills: 0
Start: 2024-11-21 | End: 2024-12-20

## 2024-11-21 RX ORDER — ROSUVASTATIN CALCIUM 10 MG
1 TABLET ORAL
Refills: 0 | DISCHARGE

## 2024-11-21 RX ADMIN — ENOXAPARIN SODIUM 40 MILLIGRAM(S): 30 INJECTION SUBCUTANEOUS at 05:52

## 2024-11-21 RX ADMIN — ENTACAPONE 200 MILLIGRAM(S): 200 TABLET, FILM COATED ORAL at 15:22

## 2024-11-21 RX ADMIN — ENTACAPONE 200 MILLIGRAM(S): 200 TABLET, FILM COATED ORAL at 19:05

## 2024-11-21 RX ADMIN — CARBIDOPA AND LEVODOPA 1 TABLET(S): 10; 100 TABLET ORAL at 00:39

## 2024-11-21 RX ADMIN — CARBIDOPA AND LEVODOPA 1 TABLET(S): 10; 100 TABLET ORAL at 07:03

## 2024-11-21 RX ADMIN — PRAMIPEXOLE 0.25 MILLIGRAM(S): 1.5 TABLET, EXTENDED RELEASE ORAL at 05:53

## 2024-11-21 RX ADMIN — POLYETHYLENE GLYCOL 3350 17 GRAM(S): 17 POWDER, FOR SOLUTION ORAL at 10:54

## 2024-11-21 RX ADMIN — CARBIDOPA AND LEVODOPA 1 TABLET(S): 10; 100 TABLET ORAL at 19:05

## 2024-11-21 RX ADMIN — CARBIDOPA AND LEVODOPA 1 TABLET(S): 10; 100 TABLET ORAL at 15:23

## 2024-11-21 RX ADMIN — CLONAZEPAM 0.25 MILLIGRAM(S): 0.12 TABLET, ORALLY DISINTEGRATING ORAL at 20:59

## 2024-11-21 RX ADMIN — METOPROLOL TARTRATE 100 MILLIGRAM(S): 100 TABLET, FILM COATED ORAL at 05:53

## 2024-11-21 RX ADMIN — DONEPEZIL HYDROCHLORIDE 10 MILLIGRAM(S): 5 TABLET, FILM COATED ORAL at 20:59

## 2024-11-21 RX ADMIN — ACETAMINOPHEN, DIPHENHYDRAMINE HCL, PHENYLEPHRINE HCL 3 MILLIGRAM(S): 325; 25; 5 TABLET ORAL at 20:59

## 2024-11-21 RX ADMIN — TAMSULOSIN HYDROCHLORIDE 0.4 MILLIGRAM(S): 0.4 CAPSULE ORAL at 20:57

## 2024-11-21 RX ADMIN — ENTACAPONE 200 MILLIGRAM(S): 200 TABLET, FILM COATED ORAL at 07:03

## 2024-11-21 RX ADMIN — Medication 1 APPLICATION(S): at 19:04

## 2024-11-21 RX ADMIN — PRAMIPEXOLE 0.12 MILLIGRAM(S): 1.5 TABLET, EXTENDED RELEASE ORAL at 15:22

## 2024-11-21 RX ADMIN — ROSUVASTATIN CALCIUM 10 MILLIGRAM(S): 5 TABLET, FILM COATED ORAL at 21:48

## 2024-11-21 RX ADMIN — CARBIDOPA AND LEVODOPA 1 TABLET(S): 10; 100 TABLET ORAL at 10:54

## 2024-11-21 RX ADMIN — ENTACAPONE 200 MILLIGRAM(S): 200 TABLET, FILM COATED ORAL at 10:54

## 2024-11-21 RX ADMIN — Medication 500 MILLIGRAM(S): at 20:57

## 2024-11-21 RX ADMIN — PRAMIPEXOLE 0.12 MILLIGRAM(S): 1.5 TABLET, EXTENDED RELEASE ORAL at 20:57

## 2024-11-21 RX ADMIN — Medication 81 MILLIGRAM(S): at 20:58

## 2024-11-21 NOTE — DISCHARGE NOTE PROVIDER - NSDCCPCAREPLAN_GEN_ALL_CORE_FT
PRINCIPAL DISCHARGE DIAGNOSIS  Diagnosis: Parkinson's disease  Assessment and Plan of Treatment: - Continue Sinemet 25/250 mg 1 tab at 7am, 11am, 3pm, and 7pm with dose of entacapone 200 mg at same timing   - Continue Sinemet 25/100mg CR 1 tab at 12 am   - Continue pramipexole 0.125 mg 3 times daily until 11/29/2024, then 2 times daily until 12/6/2024, then once daily until 12/13/2024, then discontinue  - Follow up with movement disorser specialist, report any issues with low mood in the setting of discontinuation of pramipexole      SECONDARY DISCHARGE DIAGNOSES  Diagnosis: Mild cognitive impairment  Assessment and Plan of Treatment: - Continue aricept 10mg daily   - Continue leading an active social and intellectual life    Diagnosis: Constipation  Assessment and Plan of Treatment: - Continue Senna at bedtime   - Continue Miralax daily    Diagnosis: BPH (benign prostatic hyperplasia)  Assessment and Plan of Treatment: - Continue Flomax 0.4 mg at bedtime    Diagnosis: TOYA (acute kidney injury)  Assessment and Plan of Treatment: - During hospitalization there was a temporay increase in creatinine, resolved   - Follow up with PCP    Diagnosis: Hypertension  Assessment and Plan of Treatment: - Continue metoprolol ER 100mg daily   - Follow up with PCP    Diagnosis: S/P CABG (coronary artery bypass graft)  Assessment and Plan of Treatment: - see below    Diagnosis: CAD S/P percutaneous coronary angioplasty  Assessment and Plan of Treatment: - Continue DASH diet  - Continue metoprolol ER 100mg daily   - Continue Niacin 500mg daily   - Conitnue Crestor 10mg daily   - Continue aspirin 81mg daily       Diagnosis: Bilateral edema of lower extremity  Assessment and Plan of Treatment: - This has improved during your hospital stay with short course of oral diuretic (furosemide)  - A bilateral lower extremity venous doppler (11/04/2024) was negative for DVT  - Elevate legs daily when seated, keep active and moving as much as possible   - Echo TTE (11/10):  moderate calcific aortic stenosis with mild aortic insufficiency, biatrial enlargement, left ventricular ejection fraction, by visual estimation, 55 to 60%, normal global left ventricular systolic function, moderate mitral annular calcification, trace mitral valve regurgitation, dilatation of the aortic root, Dimesionless Index value .49.  - Follow up with PCP       Diagnosis: Blister due to acute edema  Assessment and Plan of Treatment: on the left leg   - Elevate leg, cleanse 2 times daily, apply bacitracin and cover blister with adaptik and telfa   - Offset pressure  - Follow up with your PCP    Diagnosis: Insomnia  Assessment and Plan of Treatment: - Continue melatonin 3 mg at bedtime   - Continue clonazepam 0.25mg at bedtime

## 2024-11-21 NOTE — DISCHARGE NOTE PROVIDER - HOSPITAL COURSE
HPI:  74 year old male with PMH of HTN, HLD, CAD s/p PCI/CABG x 2 (last 8 years ago), BPH, Parkinson's Disease (dx 8 years ago, follows with Dr. Claudio) who presented to [facility] on 11/6 from home for frequent falls and hallucinations. He has history of occasional falls with increased frequency over the last 1-2 weeks (2-3 times this week) attributed to gait instability due to stiffness and feeling "frozen". Due to these falls, he has been more immobile and over last weekend, family noted increased LE edema for which he takes PRN lasix. Patient also endorses increase hallucination episodes (e.g. snakes in the house, having boots on when he didn't). In the ED, he was afebrile and hemodynamically stable. CTH is negative for acute intracranial pathology. LE duplex negative for DVT. He was evaluated by PT, OT, and PMR and  recommended for inpatient acute rehab. Neurology consulted for medication optimization for Parkinson's disease. Pt given lasix for b/l LE edema. He was admitted to [facility] On 11/8/24.       Rehab course significant for improvement in hallucinations without worsening of motor status or mood on gradual taper of pramipexole, to be further tapered and eventually discontinued at home.    All other medical co-morbidites were stable.    Pt tolerated course of inpatient pt/ot/slp rehab with significant functional improvements and met rehab goals prior to discharge.     Pt was medically cleared on 11/22/2024 for discharge to home.

## 2024-11-21 NOTE — DISCHARGE NOTE PROVIDER - NSDCMRMEDTOKEN_GEN_ALL_CORE_FT
acetaminophen 325 mg oral tablet: 2 tab(s) orally every 6 hours As needed Mild Pain (1 - 3)  aspirin 81 mg oral tablet: orally once a day  bacitracin 500 units/g topical ointment: 1 Apply topically to affected area once a day  betamethasone-clotrimazole topical: 2 times a day as needed for  itching  carbidopa-levodopa 25 mg-100 mg oral tablet, extended release: 1 tab(s) orally once a day at 12AM  carbidopa-levodopa 25 mg-250 mg oral tablet: 1 tab(s) orally 4 times a day 7AM, 11AM, 3PM, 7PM  donepezil 10 mg oral tablet: 1 tab(s) orally once a day  entacapone 200 mg oral tablet: 1 tab(s) orally 4 times a day  gentamicin 0.3% ophthalmic solution: 1 drop(s) in each eye 2 times a day as needed for Redness  Metoprolol Succinate  mg oral tablet, extended release: 1 tab(s) orally once a day  niacin 500 mg oral capsule, extended release: 1 cap(s) orally once a day  polyethylene glycol 3350 oral powder for reconstitution: 17 gram(s) orally once a day As needed Constipation  pramipexole 1 mg oral tablet: 1 tab(s) orally 3 times a day  senna leaf extract oral tablet: 2 tab(s) orally once a day (at bedtime)   acetaminophen 325 mg oral tablet: 2 tab(s) orally every 6 hours As needed Mild Pain (1 - 3)  aspirin 81 mg oral delayed release tablet: 1 tab(s) orally once a day (at bedtime)  betamethasone-clotrimazole topical: 2 times a day as needed for  itching  carbidopa-levodopa 25 mg-100 mg oral tablet, extended release: 1 tab(s) orally once a day at 12 am  carbidopa-levodopa 25 mg-250 mg oral tablet: 1 tab(s) orally 4 times a day at 7 am, 11 am, 3 pm, 7 pm  clonazePAM 0.25 mg oral tablet, disintegratin tab(s) orally once a day (at bedtime) MDD: 0.25mg  Crestor 10 mg oral tablet: 1 tab(s) orally once a day (at bedtime)  donepezil 10 mg oral tablet: 1 tab(s) orally once a day (at bedtime)  gentamicin 0.3% ophthalmic solution: 1 drop(s) in each eye 2 times a day as needed for Redness  melatonin 3 mg oral tablet: 1 tab(s) orally once a day (at bedtime)  metoprolol succinate 100 mg oral tablet, extended release: 1 tab(s) orally once a day  niacin 500 mg oral tablet, extended release: 1 tab(s) orally once a day (at bedtime)  polyethylene glycol 3350 oral powder for reconstitution: 17 gram(s) orally once a day  pramipexole 0.125 mg oral tablet: 1 tab(s) orally 3 times a day until 2024  pramipexole 0.125 mg oral tablet: 1 tab(s) orally 2 times a day from 2024 to 2024  pramipexole 0.125 mg oral tablet: 1 tab(s) orally once a day from 2024  senna leaf extract oral tablet: 2 tab(s) orally once a day (at bedtime)  tamsulosin 0.4 mg oral capsule: 1 cap(s) orally once a day (at bedtime)   acetaminophen 325 mg oral tablet: 2 tab(s) orally every 6 hours As needed Mild Pain (1 - 3)  aspirin 81 mg oral delayed release tablet: 1 tab(s) orally once a day (at bedtime)  betamethasone-clotrimazole topical: 2 times a day as needed for  itching  carbidopa-levodopa 25 mg-100 mg oral tablet, extended release: 1 tab(s) orally once a day at 12 am  carbidopa-levodopa 25 mg-250 mg oral tablet: 1 tab(s) orally 4 times a day at 7 am, 11 am, 3 pm, 7 pm  clonazePAM 0.25 mg oral tablet, disintegratin tab(s) orally once a day (at bedtime) MDD: 0.25mg  Crestor 10 mg oral tablet: 1 tab(s) orally once a day (at bedtime)  donepezil 10 mg oral tablet: 1 tab(s) orally once a day (at bedtime)  entacapone 200 mg oral tablet: 1 tab(s) orally 4 times a day at 7am, 11am, 3pm, and 7pm  gentamicin 0.3% ophthalmic solution: 1 drop(s) in each eye 2 times a day as needed for Redness  melatonin 3 mg oral tablet: 1 tab(s) orally once a day (at bedtime)  metoprolol succinate 100 mg oral tablet, extended release: 1 tab(s) orally once a day  niacin 500 mg oral tablet, extended release: 1 tab(s) orally once a day (at bedtime)  polyethylene glycol 3350 oral powder for reconstitution: 17 gram(s) orally once a day  pramipexole 0.125 mg oral tablet: 1 tab(s) orally 3 times a day until 2024  pramipexole 0.125 mg oral tablet: 1 tab(s) orally 2 times a day from 2024 to 2024  pramipexole 0.125 mg oral tablet: 1 tab(s) orally once a day from 2024  senna leaf extract oral tablet: 2 tab(s) orally once a day (at bedtime)  tamsulosin 0.4 mg oral capsule: 1 cap(s) orally once a day (at bedtime)

## 2024-11-21 NOTE — DISCHARGE NOTE PROVIDER - CARE PROVIDERS DIRECT ADDRESSES
[email or web address]

## 2024-11-21 NOTE — DISCHARGE NOTE PROVIDER - CARE PROVIDER_API CALL
Justine Jensen  NP in [facility]  [address], [address]  [place] [address]  Phone: [phone]  Fax: [phone]  Follow Up Time:     Vasiliy Anton  Neurology  [address] [place] [address]  Phone: [phone]  Fax: [phone]  Follow Up Time:     Chilo Celestin  Neurology  [address], [address]  [place] [address]  Phone: [phone]  Fax: [phone]  Follow Up Time:     Rubi Hollins  Neurology  [address], [address]  [place] [address]  Phone: [phone]  Fax: [phone]  Follow Up Time:

## 2024-11-21 NOTE — PROGRESS NOTE ADULT - ASSESSMENT
74 year old male with PMH of HTN, HLD, CAD s/p PCI/CABG x 2 (last 8 years ago), BPH, Parkinson's Disease (dx 8 years ago, follows with Dr. Claudio) who presented to [facility] on 11/6 from home for frequent falls and hallucinations. He has history of occasional falls with increased frequency over the last 1-2 weeks (2-3 times this week) attributed to gait instability due to stiffness and feeling "frozen". Due to these falls, he has been more immobile and over last weekend, family noted increased LE edema for which he takes PRN lasix. Patient also endorses increase hallucination episodes (e.g. snakes in the house, having boots on when he didn't). In the ED, he was afebrile and hemodynamically stable. CTH is negative for acute intracranial pathology. LE duplex negative for DVT. He was evaluated by PT, OT, and PMR and  recommended for inpatient acute rehab. Neurology consulted for medication optimization for Parkinson's disease. Pt given lasix for b/l LE edema. He was admitted to [facility] On 11/8/24.     #Parkinson's Disease now with gait Instability, ADL impairments and Functional impairments  - Continue Comprehensive Rehab Program of PT/OT/SLP    ------------------------------------------------------  Parkinson's related motor symptoms    #Rigidity/Bradykinesia/falls   -Continue Sinemet 25/250 mg 1 tab at 7am, 11am, 3pm, and 7pm with dose of entacapone 200 mg at same timing - to monitor symptoms while titrating down pramipexole, potentially to consider more frequent lower dosing and/or switch opicapone - no issues so far   -Continue Sinemet 25/100mg CR 1 tab at 12am   -Mirapex reduce to 0.125 mg TID (11/22) and then reduce of 0.125 weekly until discontinuation, consider starting SSRI if worsening affect (no issues so far)  -Follows with Dr. 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), affect is depressed and he reports mild to moderate levels of anxiety and depression in response to his current medical condition (11/15)  -Rec therapy     #Orthostatic hypotension  -Monitor OH vital signs  -Currently on metoprolol and flomax - monitor for continued need   - no significant orthostasis for now     #Pain control  - Tylenol PRN    #GI/Bowel Management/Constipation  - Continue Senna at bedtime   - Miralax daily    #Bladder management/BPH  - Continue to monitor PVR q 8 hours (SC if > 400)  - Monitor UO  - Continue flomax - monitor OH    ----------------------------------------------------------  Concurrent medical problems    #TOYA - improved   -cr 1.4 -- 1.16 11/21  -Encourage oral fluids     #Mildly elevated TSH   - T3, T4 added - stable   - TSH 4.05    #HTN  #CAD - s/p CABG and PCI in the past  -Continue metoprolol 100mg ER - monitor vitals  -EKG  -Niacin 500mg daily   -Crestor 10mg daily   -Continue aspirin 81mg daily     #bilateral lower extremity edema - with blister improving   - B/L lower extremity venous doppler (11/04/2024): negative for DVT  - s/p lasix 40mg po x 3 days as inpatient- monitor for need   - elevate legs daily  - Echo TTE (11/10):  moderate calcific aortic stenosis with mild aortic insufficiency, biatrial enlargement, left ventricular ejection fraction, by visual estimation, 55 to 60%, normal global left ventricular systolic function, moderate mitral annular calcification, trace mitral valve regurgitation, dilatation of the aortic root, Dimesionless Index value is .49.    #DVT Prophylaxis  - Lovenox  - TEDs   - LE duplex 11/4 - negative for DVT    #Skin:  -Left leg blister - elevate leg cleanse BID, apply bacitracin and cover with adaptik and telfa   -Offset pressure.     FEN   - Diet - DASH diet  - Dysphagia  SLP - evaluation and treatment    Precautions / PROPHYLAXIS:   - Falls, Cardiac  - ortho: Weight bearing status: WBAT   - Lungs: Aspiration  - Pressure injury/Skin:  OOB to Chair, PT/OT      Danilo Javier  Pulmonary Disease  [address] [place] [address]  Phone: [phone]  Fax: [phone]  Follow Up Time: 2 weeks   74 year old male with PMH of HTN, HLD, CAD s/p PCI/CABG x 2 (last 8 years ago), BPH, Parkinson's Disease (dx 8 years ago, follows with Dr. Claudio) who presented to [facility] on 11/6 from home for frequent falls and hallucinations. He has history of occasional falls with increased frequency over the last 1-2 weeks (2-3 times this week) attributed to gait instability due to stiffness and feeling "frozen". Due to these falls, he has been more immobile and over last weekend, family noted increased LE edema for which he takes PRN lasix. Patient also endorses increase hallucination episodes (e.g. snakes in the house, having boots on when he didn't). In the ED, he was afebrile and hemodynamically stable. CTH is negative for acute intracranial pathology. LE duplex negative for DVT. He was evaluated by PT, OT, and PMR and  recommended for inpatient acute rehab. Neurology consulted for medication optimization for Parkinson's disease. Pt given lasix for b/l LE edema. He was admitted to [facility] On 11/8/24.     #Parkinson's Disease now with gait Instability, ADL impairments and Functional impairments  - Continue Comprehensive Rehab Program of PT/OT/SLP    ------------------------------------------------------  Parkinson's related motor symptoms    #Rigidity/Bradykinesia/falls   -Continue Sinemet 25/250 mg 1 tab at 7am, 11am, 3pm, and 7pm with dose of entacapone 200 mg at same timing - to monitor symptoms while titrating down pramipexole, potentially to consider more frequent lower dosing and/or switch opicapone - no issues so far   -Continue Sinemet 25/100mg CR 1 tab at 12am   -Mirapex reduce to 0.125 mg TID (11/22) and then reduce of 0.125 weekly until discontinuation, consider starting SSRI if worsening affect (no issues so far)  -Follows with Dr. 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), affect is depressed and he reports mild to moderate levels of anxiety and depression in response to his current medical condition (11/15)  -Rec therapy     #Orthostatic hypotension  -Monitor OH vital signs  -Currently on metoprolol and flomax - monitor for continued need   - no significant orthostasis for now     #Pain control  - Tylenol PRN    #GI/Bowel Management/Constipation  - Continue Senna at bedtime   - Miralax daily    #Bladder management/BPH  - Continue to monitor PVR q 8 hours (SC if > 400)  - Monitor UO  - Continue flomax - monitor OH    ----------------------------------------------------------  Concurrent medical problems    #TOYA - improved   -cr 1.4 -- 1.16 11/21  -Encourage oral fluids     #Mildly elevated TSH   - T3, T4 added - stable   - TSH 4.05    #HTN  #CAD - s/p CABG and PCI in the past  -Continue metoprolol 100mg ER - monitor vitals  -EKG  -Niacin 500mg daily   -Crestor 10mg daily   -Continue aspirin 81mg daily     #bilateral lower extremity edema - with blister improving   - B/L lower extremity venous doppler (11/04/2024): negative for DVT  - s/p lasix 40mg po x 3 days as inpatient- monitor for need   - elevate legs daily  - Echo TTE (11/10):  moderate calcific aortic stenosis with mild aortic insufficiency, biatrial enlargement, left ventricular ejection fraction, by visual estimation, 55 to 60%, normal global left ventricular systolic function, moderate mitral annular calcification, trace mitral valve regurgitation, dilatation of the aortic root, Dimesionless Index value is .49.    #DVT Prophylaxis  - Lovenox  - TEDs   - LE duplex 11/4 - negative for DVT    #Skin:  -Left leg blister - elevate leg cleanse BID, apply bacitracin and cover with adaptik and telfa   -Offset pressure.     FEN   - Diet - DASH diet  - Dysphagia  SLP - evaluation and treatment    Precautions / PROPHYLAXIS:   - Falls, Cardiac  - ortho: Weight bearing status: WBAT   - Lungs: Aspiration  - Pressure injury/Skin:  OOB to Chair, PT/OT      Danilo Javier  Pulmonary Disease  [address] [place] [address]  Phone: [phone]  Fax: [phone]  Follow Up Time: 2 weeks

## 2024-11-21 NOTE — DISCHARGE NOTE PROVIDER - PROVIDER TOKENS
PROVIDER:[TOKEN:[[id number]]],PROVIDER:[TOKEN:[[id number]]],PROVIDER:[TOKEN:[[id number]]],PROVIDER:[TOKEN:[[id number]]]

## 2024-11-21 NOTE — PROGRESS NOTE ADULT - ASSESSMENT
74 year old male with PMH of HTN, HLD, CAD s/p PCI/CABG x 2, BPH, Parkinson's Disease who presented to [facility] on 11/6 from home for frequent falls and hallucinations. Falls have been increasing in frequency, now occurring 2-3x a week attributed to gait instability due to stiffness and feeling "frozen". The falls have led him to become more immobile leading to worsening LE swelling. Patient also endorses increase hallucination episodes (e.g. snakes in the house). Medical workup significant for TOYA, negative for infection and acute intracranial pathology. S/P lasix for b/l LE edema. He was subsequently referred to [facility] for acute inpatient rehab On 11/8/24.   TOYA  - resolved    #Parkinson's Disease  #Rigidity/Bradykinesia/falls  -Comprehensive Rehab Program  -PD meds per neuro-rehab  -aricept 10mg daily   -melatonin PRN   -further medication changes per movement disorder specialist.   -fall, aspiration precautions    #Orthostatic hypotension  -Currently on metoprolol and flomax   -Monitor OH vital signs  #HTN  #CAD - s/p remote CABG and PCI  # HLD  -Continue metoprolol 100mg ER   -Niacin 500mg daily   -Crestor 10mg daily   -Continue aspirin 81mg daily   -monitor vitals    #BL lower extremity edema  - now resolved.  - B/L lower extremity venous doppler (11/04/2024): negative for DVT  - s/p lasix 40mg po x 3 days as inpatient - subsequent TOYA, would avoid further use for now and use conservative management  - elevate legs daily  - TTE: EF 50-55%. calcified AS, AR. Bi Atrial enlargement  - monitor    # Abnormal TSH  - TSh mildly elevated  - no symptoms  - repeat TFT in 4-6 weeks    #Constipation  - Continue Senna at bedtime   - Miralax daily    #BPH  -Monitor UO  -Continue flomax    #DVT Prophylaxis  - Lovenox

## 2024-11-21 NOTE — PROGRESS NOTE ADULT - SUBJECTIVE AND OBJECTIVE BOX
SUBJECTIVE/ROS: Patient seen at bedside. He stated he slept much better with melatonin. Has no new complaints. Tolerating medication adjustments, will continue taper of pramipexole. He denies chest pain, fever, chills, nausea, vomiting, abdominal pain, headache, or BLE pain.       HPI:  74 year old male with PMH of HTN, HLD, CAD s/p PCI/CABG x 2 (last 8 years ago), BPH, Parkinson's Disease (dx 8 years ago, follows with Dr. Claudio) who presented to [facility] on 11/6 from home for frequent falls and hallucinations. He has history of occasional falls with increased frequency over the last 1-2 weeks (2-3 times this week) attributed to gait instability due to stiffness and feeling "frozen". Due to these falls, he has been more immobile and over last weekend, family noted increased LE edema for which he takes PRN lasix. Patient also endorses increase hallucination episodes (e.g. snakes in the house, having boots on when he didn't). In the ED, he was afebrile and hemodynamically stable. CTH is negative for acute intracranial pathology. LE duplex negative for DVT. He was evaluated by PT, OT, and PMR and  recommended for inpatient acute rehab. Neurology consulted for medication optimization for Parkinson's disease. Pt given lasix for b/l LE edema. He was admitted to [facility] On 11/8/24.     Vital Signs Last 24 Hrs  T(C): 36.6 (20 Nov 2024 20:12), Max: 36.6 (20 Nov 2024 20:12)  T(F): 97.9 (20 Nov 2024 20:12), Max: 97.9 (20 Nov 2024 20:12)  HR: 56 (21 Nov 2024 09:50) (56 - 65)  BP: 123/78 (21 Nov 2024 05:50) (122/78 - 123/78)  RR: 16 (21 Nov 2024 09:50) (16 - 16)  SpO2: 96% (21 Nov 2024 09:50) (96% - 98%)    Parameters below as of 21 Nov 2024 09:50  Patient On (Oxygen Delivery Method): room air      LABS:                          13.3   7.98  )-----------( 148      ( 21 Nov 2024 05:42 )             39.1     11-21    141  |  107  |  30[H]  ----------------------------<  101[H]  4.4   |  26  |  1.16    Ca    9.5      21 Nov 2024 05:42    TPro  7.1  /  Alb  3.5  /  TBili  1.0  /  DBili  x   /  AST  16  /  ALT  11  /  AlkPhos  70  11-21    LIVER FUNCTIONS - ( 21 Nov 2024 05:42 )  Alb: 3.5 g/dL / Pro: 7.1 g/dL / ALK PHOS: 70 U/L / ALT: 11 U/L / AST: 16 U/L / GGT: x             PHYSICAL EXAM  Constitutional - NAD, Comfortable  HEENT - NCAT, EOMI  Neck - Supple, No limited ROM  Chest - Breathing comfortably in room air   Extremities - Mild bilateral LE edema, no calf tenderness   Neurologic Exam - ao to self, place, year, good affect, sitting comfortably in the chair, minimal dyskinesias at time of visit, no tremor. Mild hypophonia. Mild rigidity. Moderate bradykinesia worse on the R.     MEDICATIONS  (STANDING):  aspirin enteric coated 81 milliGRAM(s) Oral at bedtime  bacitracin   Ointment 1 Application(s) Topical two times a day  carbidopa/levodopa  25/250 1 Tablet(s) Oral <User Schedule>  carbidopa/levodopa CR 25/100 1 Tablet(s) Oral <User Schedule>  clonazePAM Oral Disintegrating Tablet 0.25 milliGRAM(s) Oral at bedtime  donepezil 10 milliGRAM(s) Oral at bedtime  enoxaparin Injectable 40 milliGRAM(s) SubCutaneous every 24 hours  entacapone 200 milliGRAM(s) Oral <User Schedule>  melatonin 3 milliGRAM(s) Oral at bedtime  metoprolol succinate  milliGRAM(s) Oral daily  niacin  milliGRAM(s) Oral at bedtime  polyethylene glycol 3350 17 Gram(s) Oral daily  pramipexole 0.25 milliGRAM(s) Oral three times a day  rosuvastatin 10 milliGRAM(s) Oral at bedtime  senna 2 Tablet(s) Oral at bedtime  tamsulosin 0.4 milliGRAM(s) Oral at bedtime    MEDICATIONS  (PRN):  acetaminophen     Tablet .. 650 milliGRAM(s) Oral every 6 hours PRN Mild Pain (1 - 3)

## 2024-11-21 NOTE — PROGRESS NOTE ADULT - SUBJECTIVE AND OBJECTIVE BOX
Patient is a 74y old  Male who presents with a chief complaint of Parkinson's (20 Nov 2024 13:37)    Patient seen and examined at bedside. No acute overnight events. Slept well last night. Denies pain/discomfort.     ALLERGIES:  No Known Allergies    MEDICATIONS  (STANDING):  aspirin enteric coated 81 milliGRAM(s) Oral at bedtime  bacitracin   Ointment 1 Application(s) Topical two times a day  carbidopa/levodopa  25/250 1 Tablet(s) Oral <User Schedule>  carbidopa/levodopa CR 25/100 1 Tablet(s) Oral <User Schedule>  clonazePAM Oral Disintegrating Tablet 0.25 milliGRAM(s) Oral at bedtime  donepezil 10 milliGRAM(s) Oral at bedtime  enoxaparin Injectable 40 milliGRAM(s) SubCutaneous every 24 hours  entacapone 200 milliGRAM(s) Oral <User Schedule>  metoprolol succinate  milliGRAM(s) Oral daily  niacin  milliGRAM(s) Oral at bedtime  polyethylene glycol 3350 17 Gram(s) Oral daily  pramipexole 0.25 milliGRAM(s) Oral three times a day  rosuvastatin 10 milliGRAM(s) Oral at bedtime  senna 2 Tablet(s) Oral at bedtime  tamsulosin 0.4 milliGRAM(s) Oral at bedtime    MEDICATIONS  (PRN):  acetaminophen     Tablet .. 650 milliGRAM(s) Oral every 6 hours PRN Mild Pain (1 - 3)  melatonin 3 milliGRAM(s) Oral at bedtime PRN Insomnia    Vital Signs Last 24 Hrs  T(F): 97.9 (20 Nov 2024 20:12), Max: 97.9 (20 Nov 2024 20:12)  HR: 64 (21 Nov 2024 05:50) (64 - 65)  BP: 123/78 (21 Nov 2024 05:50) (122/78 - 123/78)  RR: 16 (20 Nov 2024 20:12) (16 - 16)  SpO2: 98% (20 Nov 2024 20:12) (98% - 98%)  I&O's Summary    PHYSICAL EXAM:  General: NAD, awake, alert, pleasant elderly M. sitting in chair  ENT: MMM, no tonsilar exudate  Neck: Supple, No JVD  Lungs: Clear to auscultation bilaterally, no wheezes. Good air entry bilaterally   Cardio: RRR, S1/S2, No murmurs  Abdomen: Soft, Nontender, Nondistended; Bowel sounds present  Extremities: No calf tenderness, No pitting edema    LABS:                        13.3   7.98  )-----------( 148      ( 21 Nov 2024 05:42 )             39.1       11-21    141  |  107  |  30  ----------------------------<  101  4.4   |  26  |  1.16    Ca    9.5      21 Nov 2024 05:42    TPro  7.1  /  Alb  3.5  /  TBili  1.0  /  DBili  x   /  AST  16  /  ALT  11  /  AlkPhos  70  11-21     Urinalysis Basic - ( 21 Nov 2024 05:42 )    Color: x / Appearance: x / SG: x / pH: x  Gluc: 101 mg/dL / Ketone: x  / Bili: x / Urobili: x   Blood: x / Protein: x / Nitrite: x   Leuk Esterase: x / RBC: x / WBC x   Sq Epi: x / Non Sq Epi: x / Bacteria: x    COVID-19 PCR: NotDetec (11-08-24 @ 16:40)    RADIOLOGY & ADDITIONAL TESTS:     Care Discussed with Consultants/Other Providers:

## 2024-11-22 ENCOUNTER — NON-APPOINTMENT (OUTPATIENT)
Age: 74
End: 2024-11-22

## 2024-11-22 VITALS
TEMPERATURE: 98 F | OXYGEN SATURATION: 96 % | RESPIRATION RATE: 16 BRPM | DIASTOLIC BLOOD PRESSURE: 59 MMHG | HEART RATE: 61 BPM | SYSTOLIC BLOOD PRESSURE: 104 MMHG

## 2024-11-22 PROCEDURE — 99232 SBSQ HOSP IP/OBS MODERATE 35: CPT

## 2024-11-22 PROCEDURE — 99239 HOSP IP/OBS DSCHRG MGMT >30: CPT

## 2024-11-22 RX ORDER — PRAMIPEXOLE 1.5 MG/1
1 TABLET, EXTENDED RELEASE ORAL
Qty: 9 | Refills: 0
Start: 2024-11-22 | End: 2024-11-24

## 2024-11-22 RX ORDER — ENTACAPONE 200 MG/1
1 TABLET, FILM COATED ORAL
Qty: 120 | Refills: 0
Start: 2024-11-22 | End: 2024-12-21

## 2024-11-22 RX ORDER — CLONAZEPAM 0.12 MG/1
0.25 TABLET, ORALLY DISINTEGRATING ORAL AT BEDTIME
Refills: 0 | Status: DISCONTINUED | OUTPATIENT
Start: 2024-11-22 | End: 2024-11-22

## 2024-11-22 RX ADMIN — ENTACAPONE 200 MILLIGRAM(S): 200 TABLET, FILM COATED ORAL at 06:49

## 2024-11-22 RX ADMIN — CARBIDOPA AND LEVODOPA 1 TABLET(S): 10; 100 TABLET ORAL at 06:49

## 2024-11-22 RX ADMIN — CARBIDOPA AND LEVODOPA 1 TABLET(S): 10; 100 TABLET ORAL at 00:10

## 2024-11-22 RX ADMIN — METOPROLOL TARTRATE 100 MILLIGRAM(S): 100 TABLET, FILM COATED ORAL at 06:09

## 2024-11-22 RX ADMIN — PRAMIPEXOLE 0.12 MILLIGRAM(S): 1.5 TABLET, EXTENDED RELEASE ORAL at 06:09

## 2024-11-22 RX ADMIN — CARBIDOPA AND LEVODOPA 1 TABLET(S): 10; 100 TABLET ORAL at 10:45

## 2024-11-22 RX ADMIN — PRAMIPEXOLE 0.12 MILLIGRAM(S): 1.5 TABLET, EXTENDED RELEASE ORAL at 13:55

## 2024-11-22 RX ADMIN — ENOXAPARIN SODIUM 40 MILLIGRAM(S): 30 INJECTION SUBCUTANEOUS at 06:08

## 2024-11-22 RX ADMIN — ENTACAPONE 200 MILLIGRAM(S): 200 TABLET, FILM COATED ORAL at 10:45

## 2024-11-22 NOTE — PROGRESS NOTE ADULT - SUBJECTIVE AND OBJECTIVE BOX
Interval History  Patient seen and examined at bedside. No acute events noted.  Patient reports feeling well, denies any acute complaints.     ALLERGIES:  No Known Allergies    MEDICATIONS  (STANDING):  aspirin enteric coated 81 milliGRAM(s) Oral at bedtime  bacitracin   Ointment 1 Application(s) Topical two times a day  carbidopa/levodopa  25/250 1 Tablet(s) Oral <User Schedule>  carbidopa/levodopa CR 25/100 1 Tablet(s) Oral <User Schedule>  clonazePAM Oral Disintegrating Tablet 0.25 milliGRAM(s) Oral at bedtime  donepezil 10 milliGRAM(s) Oral at bedtime  enoxaparin Injectable 40 milliGRAM(s) SubCutaneous every 24 hours  entacapone 200 milliGRAM(s) Oral <User Schedule>  melatonin 3 milliGRAM(s) Oral at bedtime  metoprolol succinate  milliGRAM(s) Oral daily  niacin  milliGRAM(s) Oral at bedtime  polyethylene glycol 3350 17 Gram(s) Oral daily  pramipexole 0.125 milliGRAM(s) Oral three times a day  rosuvastatin 10 milliGRAM(s) Oral at bedtime  senna 2 Tablet(s) Oral at bedtime  tamsulosin 0.4 milliGRAM(s) Oral at bedtime    MEDICATIONS  (PRN):  acetaminophen     Tablet .. 650 milliGRAM(s) Oral every 6 hours PRN Mild Pain (1 - 3)    Vital Signs Last 24 Hrs  T(F): 97.5 (22 Nov 2024 08:52), Max: 98.2 (21 Nov 2024 20:56)  HR: 61 (22 Nov 2024 08:52) (61 - 76)  BP: 104/59 (22 Nov 2024 08:52) (104/59 - 132/62)  RR: 16 (22 Nov 2024 08:52) (16 - 16)  SpO2: 96% (22 Nov 2024 08:52) (96% - 98%)  I&O's Summary    21 Nov 2024 07:01  -  22 Nov 2024 07:00  --------------------------------------------------------  IN: 0 mL / OUT: 600 mL / NET: -600 mL    PHYSICAL EXAM:  GENERAL: NAD  HEENT: NCAT  CHEST/LUNG: Clear to percussion bilaterally; No rales, rhonchi, wheezing  HEART: Regular rate and rhythm  ABDOMEN: Soft, Nontender, Nondistended; Bowel sounds present  MUSCULOSKELETAL/EXTREMITIES:  2+ Peripheral Pulses, No LE edema  PSYCH: Appropriate affect  NEURO: Alert & Oriented, bradykinesia    I personally reviewed the below data/images/labs:    LABS:                        13.3   7.98  )-----------( 148      ( 21 Nov 2024 05:42 )             39.1       11-21    141  |  107  |  30  ----------------------------<  101  4.4   |  26  |  1.16    Ca    9.5      21 Nov 2024 05:42    TPro  7.1  /  Alb  3.5  /  TBili  1.0  /  DBili  x   /  AST  16  /  ALT  11  /  AlkPhos  70  11-2    Urinalysis Basic - ( 21 Nov 2024 05:42 )    Color: x / Appearance: x / SG: x / pH: x  Gluc: 101 mg/dL / Ketone: x  / Bili: x / Urobili: x   Blood: x / Protein: x / Nitrite: x   Leuk Esterase: x / RBC: x / WBC x   Sq Epi: x / Non Sq Epi: x / Bacteria: x    COVID-19 PCR: NotDetec (11-08-24 @ 16:40)    Consultant(s) Notes Reviewed:   Care Discused with Consultants/Other Providers:  Imaging Personally Reviewed:

## 2024-11-22 NOTE — PROGRESS NOTE ADULT - ASSESSMENT
74 year old male with PMH of HTN, HLD, CAD s/p PCI/CABG x 2 (last 8 years ago), BPH, Parkinson's Disease (dx 8 years ago, follows with Dr. Claudio) who presented to [facility] on 11/6 from home for frequent falls and hallucinations. He has history of occasional falls with increased frequency over the last 1-2 weeks (2-3 times this week) attributed to gait instability due to stiffness and feeling "frozen". Due to these falls, he has been more immobile and over last weekend, family noted increased LE edema for which he takes PRN lasix. Patient also endorses increase hallucination episodes (e.g. snakes in the house, having boots on when he didn't). In the ED, he was afebrile and hemodynamically stable. CTH is negative for acute intracranial pathology. LE duplex negative for DVT. He was evaluated by PT, OT, and PMR and  recommended for inpatient acute rehab. Neurology consulted for medication optimization for Parkinson's disease. Pt given lasix for b/l LE edema. He was admitted to [facility] On 11/8/24.     #Parkinson's Disease now with gait Instability, ADL impairments and Functional impairments    ------------------------------------------------------  Parkinson's related motor symptoms    #Rigidity/Bradykinesia/falls   -Continue Sinemet 25/250 mg 1 tab at 7am, 11am, 3pm, and 7pm with dose of entacapone 200 mg at same timing  -Continue Sinemet 25/100mg CR 1 tab at 12am   -Mirapex reduce to 0.125 mg TID (11/22) and then reduce of 0.125 weekly at home after a few dose until discontinuation -to follow up with outpatient neurology for montioring   -Follows with Dr. 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), affect is depressed and he reports mild to moderate levels of anxiety and depression in response to his current medical condition (11/15)    #Orthostatic hypotension  -Currently on metoprolol and flomax - monitor for continued need   - no significant orthostasis    #Pain control  - Tylenol PRN    #GI/Bowel Management/Constipation  - Continue Senna at bedtime   - Miralax daily    #Bladder management/BPH  - Continue flomax - monitor OH    ----------------------------------------------------------  Concurrent medical problems    #TOYA - improved   -cr 1.4 -- 1.16 11/21  -Encourage oral fluids  -Monitor as outaptient      #Mildly elevated TSH   - T3, T4 added - stable   - TSH 4.05    #HTN  #CAD - s/p CABG and PCI in the past  -Continue metoprolol 100mg ER - monitor vitals  -EKG  -Niacin 500mg daily   -Crestor 10mg daily   -Continue aspirin 81mg daily     #bilateral lower extremity edema - with blister improving   - B/L lower extremity venous doppler (11/04/2024): negative for DVT  - s/p lasix 40mg po x 3 days as inpatient- monitor for need   - elevate legs daily  - Echo TTE (11/10):  moderate calcific aortic stenosis with mild aortic insufficiency, biatrial enlargement, left ventricular ejection fraction, by visual estimation, 55 to 60%, normal global left ventricular systolic function, moderate mitral annular calcification, trace mitral valve regurgitation, dilatation of the aortic root, Dimesionless Index value is .49.  -Continue bacitracin to blister and change dressing daily       #Skin:  -Left leg blister - elevate leg cleanse BID, apply bacitracin and cover with adaptik and telfa   -Offset pressure.     FEN   - Diet - DASH diet      Danilo Javier  Pulmonary Disease  [address] [place] [id number]  Phone: [phone]  Fax: [phone]  Follow Up Time: 2 weeks

## 2024-11-22 NOTE — PROGRESS NOTE ADULT - TIME BILLING
Time spent includes direct patient care  (interview and examination of patient), discussion with other providers, support staff and/or patient's family members, review of medical records, ordering diagnostic tests and analyzing results, and documentation.
I, the attending physician, was physically present for the key portions of the evaluation and management (E/M) service provided. The total amount of time spent reviewing the hospital course, laboratory values, imaging findings, assessing/counseling the patient, discussing with consultant physicians, social work, nursing staff was 35 minutes. 50% of the time was spent in counselling and education
Time spent includes direct patient care (interview and examination of patient), discussion with other providers, support staff and/or patient's family members, review of medical records, ordering diagnostic tests and analyzing results, and documentation
- Ordering, reviewing, and interpreting labs, testing, and imaging.  - Independently obtaining a review of systems and performing a physical exam  - Reviewing prior hospitalization and where necessary, outpatient records.  - Counselling and educating patient and family regarding interpretation of aforementioned items and plan of care.
- Ordering, reviewing, and interpreting labs, testing, and imaging.  - Independently obtaining a review of systems and performing a physical exam  - Reviewing prior hospitalization and where necessary, outpatient records.  - Counselling and educating patient and family regarding interpretation of aforementioned items and plan of care.
This includes reviewing results/imaging and discussions with specialists, nursing, case management/social work. Further tests, medications, and procedures have been ordered as indicated. Results and the plan of care were communicated to the patient and/or their family member. Supporting documentation was completed and added to the patient's chart.
Time spent includes direct patient care  (interview and examination of patient), discussion with other providers, support staff and/or patient's family members, review of medical records, ordering diagnostic tests and analyzing results, and documentation.
Time spent includes direct patient care  (interview and examination of patient), discussion with other providers, support staff and/or patient's family members, review of medical records, ordering diagnostic tests and analyzing results, and documentation.
This includes reviewing results/imaging and discussions with specialists, nursing, case management/social work. Further tests, medications, and procedures have been ordered as indicated. Results and the plan of care were communicated to the patient and/or their family member. Supporting documentation was completed and added to the patient's chart.

## 2024-11-22 NOTE — PROGRESS NOTE ADULT - SUBJECTIVE AND OBJECTIVE BOX
SUBJECTIVE/ROS: Patient seen at bedside.  He denies chest pain, fever, chills, nausea, vomiting, abdominal pain, headache, or BLE pain. He is stable for discharge home. Updates provided to wife and son at bedside. All questions answered and instructions reviewed.       HPI:  74 year old male with PMH of HTN, HLD, CAD s/p PCI/CABG x 2 (last 8 years ago), BPH, Parkinson's Disease (dx 8 years ago, follows with Dr. Claudio) who presented to [facility] on 11/6 from home for frequent falls and hallucinations. He has history of occasional falls with increased frequency over the last 1-2 weeks (2-3 times this week) attributed to gait instability due to stiffness and feeling "frozen". Due to these falls, he has been more immobile and over last weekend, family noted increased LE edema for which he takes PRN lasix. Patient also endorses increase hallucination episodes (e.g. snakes in the house, having boots on when he didn't). In the ED, he was afebrile and hemodynamically stable. CTH is negative for acute intracranial pathology. LE duplex negative for DVT. He was evaluated by PT, OT, and PMR and  recommended for inpatient acute rehab. Neurology consulted for medication optimization for Parkinson's disease. Pt given lasix for b/l LE edema. He was admitted to [facility] On 11/8/24.     Vital Signs Last 24 Hrs  T(C): 36.8 (21 Nov 2024 20:56), Max: 36.8 (21 Nov 2024 20:56)  T(F): 98.2 (21 Nov 2024 20:56), Max: 98.2 (21 Nov 2024 20:56)  HR: 76 (22 Nov 2024 06:07) (56 - 76)  BP: 127/77 (22 Nov 2024 06:07) (127/77 - 132/62)  BP(mean): --  RR: 16 (21 Nov 2024 20:56) (16 - 16)  SpO2: 98% (21 Nov 2024 20:56) (96% - 98%)    Parameters below as of 21 Nov 2024 20:56  Patient On (Oxygen Delivery Method): room air          PHYSICAL EXAM  Constitutional - NAD, Comfortable  HEENT - NCAT, EOMI  Neck - Supple, No limited ROM  Chest - Breathing comfortably in room air   Extremities - Mild bilateral LE edema, no calf tenderness   Neurologic Exam - ao to self, place, year, good affect, sitting comfortably in the chair, minimal dyskinesias at time of visit, no tremor. Mild hypophonia. Mild rigidity. Moderate bradykinesia worse on the R.       LABS:                          13.3   7.98  )-----------( 148      ( 21 Nov 2024 05:42 )             39.1     11-21    141  |  107  |  30[H]  ----------------------------<  101[H]  4.4   |  26  |  1.16    Ca    9.5      21 Nov 2024 05:42    TPro  7.1  /  Alb  3.5  /  TBili  1.0  /  DBili  x   /  AST  16  /  ALT  11  /  AlkPhos  70  11-21    LIVER FUNCTIONS - ( 21 Nov 2024 05:42 )  Alb: 3.5 g/dL / Pro: 7.1 g/dL / ALK PHOS: 70 U/L / ALT: 11 U/L / AST: 16 U/L / GGT: x               MEDICATIONS  (STANDING):  aspirin enteric coated 81 milliGRAM(s) Oral at bedtime  bacitracin   Ointment 1 Application(s) Topical two times a day  carbidopa/levodopa  25/250 1 Tablet(s) Oral <User Schedule>  carbidopa/levodopa CR 25/100 1 Tablet(s) Oral <User Schedule>  clonazePAM Oral Disintegrating Tablet 0.25 milliGRAM(s) Oral at bedtime  donepezil 10 milliGRAM(s) Oral at bedtime  enoxaparin Injectable 40 milliGRAM(s) SubCutaneous every 24 hours  entacapone 200 milliGRAM(s) Oral <User Schedule>  melatonin 3 milliGRAM(s) Oral at bedtime  metoprolol succinate  milliGRAM(s) Oral daily  niacin  milliGRAM(s) Oral at bedtime  polyethylene glycol 3350 17 Gram(s) Oral daily  pramipexole 0.25 milliGRAM(s) Oral three times a day  rosuvastatin 10 milliGRAM(s) Oral at bedtime  senna 2 Tablet(s) Oral at bedtime  tamsulosin 0.4 milliGRAM(s) Oral at bedtime    MEDICATIONS  (PRN):  acetaminophen     Tablet .. 650 milliGRAM(s) Oral every 6 hours PRN Mild Pain (1 - 3)

## 2024-11-22 NOTE — PROGRESS NOTE ADULT - ASSESSMENT
74 year old male with PMH of HTN, HLD, CAD s/p PCI/CABG x 2, BPH, Parkinson's Disease who presented to [facility] on 11/6 from home for frequent falls and hallucinations. Falls have been increasing in frequency, now occurring 2-3x a week attributed to gait instability due to stiffness and feeling "frozen". The falls have led him to become more immobile leading to worsening LE swelling. Patient also endorses increase hallucination episodes (e.g. snakes in the house). Medical workup significant for TOYA, negative for infection and acute intracranial pathology. S/P lasix for b/l LE edema. He was subsequently referred to [facility] for acute inpatient rehab On 11/8/24.     #Parkinson's Disease  #Rigidity/Bradykinesia  #Frequent Falls  -Continue sinemet, entacapone  -Continue mirapex  -Continue aricept  -Continue clonazepam  -Fall precaution  -Comprehensive Rehab Program w/ PT/OT/SLP  -Movement disorders following    #Orthostatic hypotension  -Monitor OH vital signs    #TOYA (Improved)  -Baseline Cr ~0.9 range  -Cr 1.16 on 11/21  -Monitor BMP with PMD     #HTN  #CAD - s/p remote CABG and PCI  # HLD  -Continue aspirin, metoprolol, crestor, niacin   -Monitor BP, controlled     #BL LE Edema (Resolved)  - now resolved.  - B/L lower extremity venous doppler (11/04/2024): negative for DVT  - s/p lasix 40mg po x 3 days as inpatient - subsequent TOYA, would avoid further use for now and use conservative management  - Elevate legs daily  - TTE: EF 50-55%. calcified AS, AR. Bi Atrial enlargement    # Abnormal TSH  - TSH 4.05, FT4 WNL  - Repeat TFT with PMD in 4 weeks     #BPH  -Continue flomax    #DVT Prophylaxis  - Lovenox SC

## 2024-11-22 NOTE — PROGRESS NOTE ADULT - PROVIDER SPECIALTY LIST ADULT
Hospitalist
Hospitalist
Neuro Rehabilitation
Physiatry
Hospitalist
Hospitalist
Neuro Rehabilitation
Neuro Rehabilitation
Rehab Medicine
Hospitalist
Neuro Rehabilitation
Neuro Rehabilitation
Physiatry
Rehab Medicine
Neuro Rehabilitation
Neuropsychology
Hospitalist

## 2024-11-22 NOTE — PROGRESS NOTE ADULT - REASON FOR ADMISSION
Parkinson's

## 2024-11-22 NOTE — PROGRESS NOTE ADULT - NS ATTEND AMEND GEN_ALL_CORE FT
I have personally seen and examined the patient with the Nurse Practitioner. Medical records reviewed. I have made amendments to the documentation where necessary and adjusted the history, physical examination, and plan as documented by the Nurse Practitioner. Case was discussed at Interdisciplinary Team meeting yesterday. A tentative discharge date was set for 11/22/24. Patient is eager to continue participation on the recommended rehabilitation program.
I have personally seen and examined the patient with the NP. Medical records reviewed. I have made amendments to the documentation where necessary and adjusted the history, physical examination, and plan as documented by the NP.    Multidisciplinary team meeting today:  patient's functional goals and needs, functional and clinical  progress were discussed, barriers to discharge were identified. Anticipate discharge home with home care, 24/7 supervision for safety.  EDOD 11/22/24
I have personally seen and examined the patient with the NP. Medical records reviewed. I have made amendments to the documentation where necessary and adjusted the history, physical examination, and plan as documented by the NP.   Patient is being discharged home with home care  today.  Discharge instructions were discussed with patient and family, all current medications were sent to the pharmacy. Patient and family were educated on importance of medication compliance,  continued  care with PCP and follow-up care with the specialists in the community. Safety and fall risk precautions  were discussed in detail, counseled on healthy life style modifications.  All questions were answered to their satisfaction.    45 min spent

## 2024-11-22 NOTE — CHART NOTE - NSCHARTNOTEFT_GEN_A_CORE
Nutrition Follow Up Note  Hospital Course   (Per Electronic Medical Record)    Source:  Patient [X]  Medical Record [X]      Diet:   DASH-TLC Diet w/ Thin Liquids (IDDSI Level 0)  Tolerates Diet Consistency Well  No Chewing/Swallowing Difficulties  No Recent Nausea, Vomiting, Diarrhea or Constipation (as Per Patient)  Consumes % of Meals (as Per Documentation) - States Good PO Intake/Appetite (Per Patient)  on Gene 1 Packet BID (Provides 180kcal & 28grams of Protein)  Patient Takes Nutrition Supplement Occasionally  Education Provided on Need for Supplementation & Proper Nutrition     Enteral/Parenteral Nutrition: Not Applicable    Current Weight: 213.8lb on 11/8  Obtain New Weight  Obtain Weights Weekly     Pertinent Medications: MEDICATIONS  (STANDING):  aspirin enteric coated 81 milliGRAM(s) Oral at bedtime  bacitracin   Ointment 1 Application(s) Topical two times a day  carbidopa/levodopa  25/250 1 Tablet(s) Oral <User Schedule>  carbidopa/levodopa CR 25/100 1 Tablet(s) Oral <User Schedule>  clonazePAM Oral Disintegrating Tablet 0.25 milliGRAM(s) Oral at bedtime  donepezil 10 milliGRAM(s) Oral at bedtime  enoxaparin Injectable 40 milliGRAM(s) SubCutaneous every 24 hours  entacapone 200 milliGRAM(s) Oral <User Schedule>  melatonin 3 milliGRAM(s) Oral at bedtime  metoprolol succinate  milliGRAM(s) Oral daily  niacin  milliGRAM(s) Oral at bedtime  polyethylene glycol 3350 17 Gram(s) Oral daily  pramipexole 0.125 milliGRAM(s) Oral three times a day  rosuvastatin 10 milliGRAM(s) Oral at bedtime  senna 2 Tablet(s) Oral at bedtime  tamsulosin 0.4 milliGRAM(s) Oral at bedtime    MEDICATIONS  (PRN):  acetaminophen     Tablet .. 650 milliGRAM(s) Oral every 6 hours PRN Mild Pain (1 - 3)    Pertinent Labs:  11-21 Na141 mmol/L Glu 101 mg/dL[H] K+ 4.4 mmol/L Cr  1.16 mg/dL BUN 30 mg/dL[H] 11-21 Alb 3.5 g/dL    Skin: No Pressure Ulcers     Edema: None Noted Recently (as Per Documentation)     Last Bowel Movement: on 11/22    Estimated Needs:   [X] No Change Since Previous Assessment    Previous Nutrition Diagnosis:   Increased Nutrient Needs - Calories & Protein     Nutrition Diagnosis is [X] Ongoing - Continues on Nutrition Supplement & Education Provided on Need for Supplementation     New Nutrition Diagnosis: [X] Not Applicable    Interventions:   1. Education Provided on Need for Supplementation & Proper Nutrition   2. Recommend Continue Nutrition Plan of Care     Monitoring & Evaluation:   [X] Weights   [X] PO Intake   [X] Skin Integrity   [X] Follow Up (Per Protocol)  [X] Tolerance to Diet Prescription   [X] Other: Labs     Registered Dietitian/Nutritionist Remains Available.  Bib Gardner, ELIZABETHN, CDN    Phone# [phone]

## 2024-11-25 RX ORDER — PRAMIPEXOLE 1.5 MG/1
1 TABLET, EXTENDED RELEASE ORAL
Qty: 90 | Refills: 0
Start: 2024-11-25 | End: 2024-12-24

## 2024-11-26 PROCEDURE — 71045 X-RAY EXAM CHEST 1 VIEW: CPT

## 2024-11-26 PROCEDURE — 70450 CT HEAD/BRAIN W/O DYE: CPT | Mod: MC

## 2024-11-26 PROCEDURE — 97116 GAIT TRAINING THERAPY: CPT

## 2024-11-26 PROCEDURE — G0378: CPT

## 2024-11-26 PROCEDURE — 93005 ELECTROCARDIOGRAM TRACING: CPT

## 2024-11-26 PROCEDURE — 81003 URINALYSIS AUTO W/O SCOPE: CPT

## 2024-11-26 PROCEDURE — 84100 ASSAY OF PHOSPHORUS: CPT

## 2024-11-26 PROCEDURE — 83735 ASSAY OF MAGNESIUM: CPT

## 2024-11-26 PROCEDURE — 93970 EXTREMITY STUDY: CPT

## 2024-11-26 PROCEDURE — 85027 COMPLETE CBC AUTOMATED: CPT

## 2024-11-26 PROCEDURE — 99285 EMERGENCY DEPT VISIT HI MDM: CPT

## 2024-11-26 PROCEDURE — 97535 SELF CARE MNGMENT TRAINING: CPT

## 2024-11-26 PROCEDURE — 97162 PT EVAL MOD COMPLEX 30 MIN: CPT

## 2024-11-26 PROCEDURE — 96360 HYDRATION IV INFUSION INIT: CPT

## 2024-11-26 PROCEDURE — 36415 COLL VENOUS BLD VENIPUNCTURE: CPT

## 2024-11-26 PROCEDURE — 84132 ASSAY OF SERUM POTASSIUM: CPT

## 2024-11-26 PROCEDURE — 80053 COMPREHEN METABOLIC PANEL: CPT

## 2024-11-26 PROCEDURE — 85025 COMPLETE CBC W/AUTO DIFF WBC: CPT

## 2024-11-26 PROCEDURE — 97166 OT EVAL MOD COMPLEX 45 MIN: CPT

## 2024-11-26 PROCEDURE — 96361 HYDRATE IV INFUSION ADD-ON: CPT

## 2024-11-26 PROCEDURE — 97110 THERAPEUTIC EXERCISES: CPT

## 2024-12-23 PROCEDURE — 97112 NEUROMUSCULAR REEDUCATION: CPT | Mod: GP

## 2024-12-23 PROCEDURE — 85025 COMPLETE CBC W/AUTO DIFF WBC: CPT

## 2024-12-23 PROCEDURE — 92507 TX SP LANG VOICE COMM INDIV: CPT | Mod: GN

## 2024-12-23 PROCEDURE — 87635 SARS-COV-2 COVID-19 AMP PRB: CPT

## 2024-12-23 PROCEDURE — 93005 ELECTROCARDIOGRAM TRACING: CPT

## 2024-12-23 PROCEDURE — 97161 PT EVAL LOW COMPLEX 20 MIN: CPT | Mod: GP

## 2024-12-23 PROCEDURE — 97110 THERAPEUTIC EXERCISES: CPT | Mod: GO

## 2024-12-23 PROCEDURE — 84439 ASSAY OF FREE THYROXINE: CPT

## 2024-12-23 PROCEDURE — 80048 BASIC METABOLIC PNL TOTAL CA: CPT

## 2024-12-23 PROCEDURE — 36415 COLL VENOUS BLD VENIPUNCTURE: CPT

## 2024-12-23 PROCEDURE — 97165 OT EVAL LOW COMPLEX 30 MIN: CPT | Mod: GO

## 2024-12-23 PROCEDURE — 97535 SELF CARE MNGMENT TRAINING: CPT | Mod: GO

## 2024-12-23 PROCEDURE — 84443 ASSAY THYROID STIM HORMONE: CPT

## 2024-12-23 PROCEDURE — 97530 THERAPEUTIC ACTIVITIES: CPT | Mod: GO

## 2024-12-23 PROCEDURE — 92610 EVALUATE SWALLOWING FUNCTION: CPT | Mod: GN

## 2024-12-23 PROCEDURE — 97116 GAIT TRAINING THERAPY: CPT | Mod: GP

## 2024-12-23 PROCEDURE — 80053 COMPREHEN METABOLIC PANEL: CPT

## 2024-12-23 PROCEDURE — 92523 SPEECH SOUND LANG COMPREHEN: CPT | Mod: GN

## 2024-12-23 PROCEDURE — 93306 TTE W/DOPPLER COMPLETE: CPT

## 2024-12-23 PROCEDURE — 84481 FREE ASSAY (FT-3): CPT

## 2025-02-10 ENCOUNTER — APPOINTMENT (OUTPATIENT)
Facility: CLINIC | Age: 75
End: 2025-02-10

## 2025-03-03 ENCOUNTER — NON-APPOINTMENT (OUTPATIENT)
Age: 75
End: 2025-03-03

## 2025-03-03 ENCOUNTER — APPOINTMENT (OUTPATIENT)
Facility: CLINIC | Age: 75
End: 2025-03-03
Payer: MEDICARE

## 2025-03-03 VITALS
SYSTOLIC BLOOD PRESSURE: 116 MMHG | HEIGHT: 70 IN | OXYGEN SATURATION: 97 % | BODY MASS INDEX: 30.06 KG/M2 | HEART RATE: 69 BPM | TEMPERATURE: 97.6 F | RESPIRATION RATE: 18 BRPM | DIASTOLIC BLOOD PRESSURE: 57 MMHG | WEIGHT: 210 LBS

## 2025-03-03 DIAGNOSIS — I25.10 ATHEROSCLEROTIC HEART DISEASE OF NATIVE CORONARY ARTERY W/OUT ANGINA PECTORIS: ICD-10-CM

## 2025-03-03 DIAGNOSIS — I38 ENDOCARDITIS, VALVE UNSPECIFIED: ICD-10-CM

## 2025-03-03 DIAGNOSIS — N40.1 BENIGN PROSTATIC HYPERPLASIA WITH LOWER URINARY TRACT SYMPMS: ICD-10-CM

## 2025-03-03 DIAGNOSIS — R31.0 GROSS HEMATURIA: ICD-10-CM

## 2025-03-03 DIAGNOSIS — N13.8 BENIGN PROSTATIC HYPERPLASIA WITH LOWER URINARY TRACT SYMPMS: ICD-10-CM

## 2025-03-03 DIAGNOSIS — R97.20 ELEVATED PROSTATE, SPECIFIC ANTIGEN [PSA]: ICD-10-CM

## 2025-03-03 DIAGNOSIS — G20.A1 PARKINSON'S DISEASE WITHOUT DYSKINESIA, WITHOUT MENTION OF FLUCTUATIONS: ICD-10-CM

## 2025-03-03 DIAGNOSIS — N28.9 DISORDER OF KIDNEY AND URETER, UNSPECIFIED: ICD-10-CM

## 2025-03-03 DIAGNOSIS — E55.9 VITAMIN D DEFICIENCY, UNSPECIFIED: ICD-10-CM

## 2025-03-03 DIAGNOSIS — Z86.39 PERSONAL HISTORY OF OTHER ENDOCRINE, NUTRITIONAL AND METABOLIC DISEASE: ICD-10-CM

## 2025-03-03 DIAGNOSIS — E78.5 HYPERLIPIDEMIA, UNSPECIFIED: ICD-10-CM

## 2025-03-03 PROCEDURE — 93000 ELECTROCARDIOGRAM COMPLETE: CPT

## 2025-03-03 PROCEDURE — 99387 INIT PM E/M NEW PAT 65+ YRS: CPT | Mod: GY

## 2025-03-03 RX ORDER — GENTAMICIN SULFATE 3 MG/ML
0.3 SOLUTION OPHTHALMIC
Qty: 1 | Refills: 5 | Status: ACTIVE | COMMUNITY
Start: 2025-03-03 | End: 1900-01-01

## 2025-03-24 ENCOUNTER — NON-APPOINTMENT (OUTPATIENT)
Age: 75
End: 2025-03-24

## 2025-03-24 ENCOUNTER — APPOINTMENT (OUTPATIENT)
Dept: NEUROLOGY | Facility: CLINIC | Age: 75
End: 2025-03-24
Payer: MEDICARE

## 2025-03-24 VITALS
TEMPERATURE: 97.9 F | HEIGHT: 70 IN | OXYGEN SATURATION: 98 % | DIASTOLIC BLOOD PRESSURE: 68 MMHG | WEIGHT: 210 LBS | SYSTOLIC BLOOD PRESSURE: 118 MMHG | HEART RATE: 60 BPM | BODY MASS INDEX: 30.06 KG/M2

## 2025-03-24 DIAGNOSIS — F32.A DEPRESSION, UNSPECIFIED: ICD-10-CM

## 2025-03-24 DIAGNOSIS — G20.A1 PARKINSON'S DISEASE WITHOUT DYSKINESIA, WITHOUT MENTION OF FLUCTUATIONS: ICD-10-CM

## 2025-03-24 PROCEDURE — 99215 OFFICE O/P EST HI 40 MIN: CPT

## 2025-03-24 PROCEDURE — G2212 PROLONG OUTPT/OFFICE VIS: CPT

## 2025-03-24 RX ORDER — DONEPEZIL HYDROCHLORIDE 10 MG/1
10 TABLET ORAL
Qty: 90 | Refills: 0 | Status: ACTIVE | COMMUNITY
Start: 2025-01-06

## 2025-03-24 RX ORDER — ROSUVASTATIN CALCIUM 10 MG/1
10 TABLET, FILM COATED ORAL
Qty: 90 | Refills: 0 | Status: ACTIVE | COMMUNITY
Start: 2025-01-06

## 2025-03-24 RX ORDER — FUROSEMIDE 40 MG/1
40 TABLET ORAL
Qty: 90 | Refills: 0 | Status: ACTIVE | COMMUNITY
Start: 2025-01-06

## 2025-03-24 RX ORDER — METOPROLOL SUCCINATE 100 MG/1
100 TABLET, EXTENDED RELEASE ORAL
Qty: 90 | Refills: 0 | Status: ACTIVE | COMMUNITY
Start: 2025-02-19

## 2025-03-24 RX ORDER — CLONAZEPAM 0.25 MG/1
0.25 TABLET, ORALLY DISINTEGRATING ORAL
Qty: 60 | Refills: 0 | Status: ACTIVE | COMMUNITY
Start: 2025-02-24

## 2025-03-24 RX ORDER — CARBIDOPA AND LEVODOPA 25; 250 MG/1; MG/1
25-250 TABLET ORAL
Qty: 360 | Refills: 0 | Status: ACTIVE | COMMUNITY
Start: 2024-05-13

## 2025-03-25 RX ORDER — CARBIDOPA AND LEVODOPA 50; 200 MG/1; MG/1
50-200 TABLET, EXTENDED RELEASE ORAL
Qty: 90 | Refills: 0 | Status: ACTIVE | COMMUNITY
Start: 2025-01-29 | End: 1900-01-01

## 2025-03-25 RX ORDER — ENTACAPONE 200 MG/1
200 TABLET, FILM COATED ORAL
Qty: 360 | Refills: 0 | Status: ACTIVE | COMMUNITY
Start: 2024-11-22 | End: 1900-01-01

## 2025-03-25 RX ORDER — ESCITALOPRAM OXALATE 5 MG/1
5 TABLET ORAL DAILY
Qty: 45 | Refills: 0 | Status: ACTIVE | COMMUNITY
Start: 2025-03-24 | End: 1900-01-01

## 2025-04-11 DIAGNOSIS — G47.8 OTHER SLEEP DISORDERS: ICD-10-CM

## 2025-04-11 DIAGNOSIS — G47.00 INSOMNIA, UNSPECIFIED: ICD-10-CM

## 2025-04-23 ENCOUNTER — LABORATORY RESULT (OUTPATIENT)
Age: 75
End: 2025-04-23

## 2025-04-24 ENCOUNTER — TRANSCRIPTION ENCOUNTER (OUTPATIENT)
Age: 75
End: 2025-04-24

## 2025-04-24 LAB
APPEARANCE: CLEAR
BILIRUBIN URINE: ABNORMAL
BLOOD URINE: NEGATIVE
COLOR: NORMAL
GLUCOSE QUALITATIVE U: NEGATIVE MG/DL
KETONES URINE: ABNORMAL MG/DL
LEUKOCYTE ESTERASE URINE: ABNORMAL
NITRITE URINE: NEGATIVE
PH URINE: 5.5
PROTEIN URINE: 30 MG/DL
SPECIFIC GRAVITY URINE: 1.02
UROBILINOGEN URINE: 1 MG/DL

## 2025-04-28 ENCOUNTER — APPOINTMENT (OUTPATIENT)
Dept: NEUROLOGY | Facility: CLINIC | Age: 75
End: 2025-04-28
Payer: MEDICARE

## 2025-04-28 VITALS
BODY MASS INDEX: 28.2 KG/M2 | OXYGEN SATURATION: 98 % | SYSTOLIC BLOOD PRESSURE: 101 MMHG | DIASTOLIC BLOOD PRESSURE: 62 MMHG | HEART RATE: 57 BPM | HEIGHT: 70 IN | TEMPERATURE: 97.7 F | WEIGHT: 197 LBS

## 2025-04-28 PROCEDURE — G2212 PROLONG OUTPT/OFFICE VIS: CPT

## 2025-04-28 PROCEDURE — 99215 OFFICE O/P EST HI 40 MIN: CPT

## 2025-04-28 RX ORDER — SENNOSIDES 8.6 MG/1
CAPSULE, GELATIN COATED ORAL
Refills: 0 | Status: ACTIVE | COMMUNITY

## 2025-04-29 RX ORDER — OPICAPONE 50 MG/1
50 CAPSULE ORAL
Qty: 30 | Refills: 0 | Status: ACTIVE | COMMUNITY
Start: 2025-04-29 | End: 1900-01-01

## 2025-04-30 ENCOUNTER — APPOINTMENT (OUTPATIENT)
Dept: UROLOGY | Facility: CLINIC | Age: 75
End: 2025-04-30
Payer: MEDICARE

## 2025-04-30 DIAGNOSIS — N40.1 BENIGN PROSTATIC HYPERPLASIA WITH LOWER URINARY TRACT SYMPMS: ICD-10-CM

## 2025-04-30 DIAGNOSIS — N13.8 BENIGN PROSTATIC HYPERPLASIA WITH LOWER URINARY TRACT SYMPMS: ICD-10-CM

## 2025-04-30 LAB
APPEARANCE: CLEAR
BACTERIA: NEGATIVE /HPF
BILIRUBIN URINE: ABNORMAL
BLOOD URINE: NEGATIVE
CAST: 1 /LPF
COLOR: NORMAL
EPITHELIAL CELLS: 1 /HPF
GLUCOSE QUALITATIVE U: NEGATIVE MG/DL
KETONES URINE: ABNORMAL MG/DL
LEUKOCYTE ESTERASE URINE: ABNORMAL
MICROSCOPIC-UA: NORMAL
NITRITE URINE: NEGATIVE
PH URINE: 5.5
PROTEIN URINE: 30 MG/DL
PSA FREE FLD-MCNC: 24 %
PSA FREE SERPL-MCNC: 2.27 NG/ML
PSA SERPL-MCNC: 9.43 NG/ML
RED BLOOD CELLS URINE: 1 /HPF
SPECIFIC GRAVITY URINE: 1.03
UROBILINOGEN URINE: 1 MG/DL
WHITE BLOOD CELLS URINE: 0 /HPF

## 2025-04-30 PROCEDURE — 99214 OFFICE O/P EST MOD 30 MIN: CPT

## 2025-04-30 PROCEDURE — G2211 COMPLEX E/M VISIT ADD ON: CPT

## 2025-04-30 RX ORDER — TAMSULOSIN HYDROCHLORIDE 0.4 MG/1
0.4 CAPSULE ORAL
Qty: 90 | Refills: 3 | Status: ACTIVE | COMMUNITY
Start: 2025-04-30 | End: 1900-01-01

## 2025-05-21 ENCOUNTER — RX RENEWAL (OUTPATIENT)
Age: 75
End: 2025-05-21

## 2025-06-11 RX ORDER — ENTACAPONE 200 MG/1
200 TABLET, FILM COATED ORAL
Qty: 150 | Refills: 0 | Status: ACTIVE | COMMUNITY
Start: 2025-06-11 | End: 1900-01-01

## 2025-06-16 ENCOUNTER — APPOINTMENT (OUTPATIENT)
Dept: NEUROLOGY | Facility: CLINIC | Age: 75
End: 2025-06-16

## 2025-06-23 ENCOUNTER — APPOINTMENT (OUTPATIENT)
Dept: NEUROLOGY | Facility: CLINIC | Age: 75
End: 2025-06-23

## 2025-07-08 ENCOUNTER — RX RENEWAL (OUTPATIENT)
Age: 75
End: 2025-07-08

## 2025-07-10 ENCOUNTER — RX RENEWAL (OUTPATIENT)
Age: 75
End: 2025-07-10

## 2025-07-14 ENCOUNTER — APPOINTMENT (OUTPATIENT)
Facility: CLINIC | Age: 75
End: 2025-07-14
Payer: MEDICARE

## 2025-07-14 VITALS
DIASTOLIC BLOOD PRESSURE: 57 MMHG | OXYGEN SATURATION: 95 % | SYSTOLIC BLOOD PRESSURE: 112 MMHG | RESPIRATION RATE: 17 BRPM | WEIGHT: 196 LBS | TEMPERATURE: 97.6 F | HEART RATE: 58 BPM | HEIGHT: 70 IN | BODY MASS INDEX: 28.06 KG/M2

## 2025-07-14 PROBLEM — R97.20 PSA ELEVATION: Status: ACTIVE | Noted: 2023-04-27

## 2025-07-14 PROCEDURE — G2211 COMPLEX E/M VISIT ADD ON: CPT

## 2025-07-14 PROCEDURE — 99214 OFFICE O/P EST MOD 30 MIN: CPT

## 2025-07-29 ENCOUNTER — RX RENEWAL (OUTPATIENT)
Age: 75
End: 2025-07-29

## 2025-08-01 ENCOUNTER — RX RENEWAL (OUTPATIENT)
Age: 75
End: 2025-08-01

## 2025-08-25 ENCOUNTER — RX RENEWAL (OUTPATIENT)
Age: 75
End: 2025-08-25

## 2025-08-29 ENCOUNTER — OFFICE (OUTPATIENT)
Dept: URBAN - METROPOLITAN AREA CLINIC 109 | Facility: CLINIC | Age: 75
Setting detail: OPHTHALMOLOGY
End: 2025-08-29
Payer: MEDICARE

## 2025-08-29 DIAGNOSIS — H02.834: ICD-10-CM

## 2025-08-29 DIAGNOSIS — H02.831: ICD-10-CM

## 2025-08-29 DIAGNOSIS — H02.835: ICD-10-CM

## 2025-08-29 DIAGNOSIS — H01.004: ICD-10-CM

## 2025-08-29 DIAGNOSIS — H02.135: ICD-10-CM

## 2025-08-29 DIAGNOSIS — H25.13: ICD-10-CM

## 2025-08-29 DIAGNOSIS — H01.005: ICD-10-CM

## 2025-08-29 DIAGNOSIS — H01.002: ICD-10-CM

## 2025-08-29 DIAGNOSIS — H01.001: ICD-10-CM

## 2025-08-29 DIAGNOSIS — H02.832: ICD-10-CM

## 2025-08-29 DIAGNOSIS — H16.223: ICD-10-CM

## 2025-08-29 PROBLEM — H40.033 NARROW ANGLE GLAUCOMA SUSPECT; BOTH EYES: Status: ACTIVE | Noted: 2025-08-29

## 2025-08-29 PROCEDURE — 99213 OFFICE O/P EST LOW 20 MIN: CPT | Performed by: OPTOMETRIST

## 2025-08-29 ASSESSMENT — REFRACTION_MANIFEST
OS_AXIS: 082
OD_SPHERE: +0.50
OS_CYLINDER: -3.25
OD_ADD: +2.50
OD_CYLINDER: -3.50
OS_SPHERE: +1.00
OS_ADD: +2.50
OD_AXIS: 97

## 2025-08-29 ASSESSMENT — TONOMETRY
OS_IOP_MMHG: 15
OD_IOP_MMHG: 17

## 2025-08-29 ASSESSMENT — LID POSITION - DERMATOCHALASIS
OD_DERMATOCHALASIS: RLL RUL
OS_DERMATOCHALASIS: LLL LUL

## 2025-08-29 ASSESSMENT — REFRACTION_AUTOREFRACTION
OS_SPHERE: +1.50
OD_AXIS: 097
OD_SPHERE: +1.00
OD_CYLINDER: -3.75
OS_AXIS: 081
OS_CYLINDER: -3.00

## 2025-08-29 ASSESSMENT — KERATOMETRY
OS_K2POWER_DIOPTERS: 45.00
OD_AXISANGLE_DEGREES: 002
OS_AXISANGLE_DEGREES: 003
OS_K1POWER_DIOPTERS: 42.75
OD_K1POWER_DIOPTERS: 42.75
OD_K2POWER_DIOPTERS: 45.50

## 2025-08-29 ASSESSMENT — REFRACTION_CURRENTRX
OD_AXIS: 97
OD_SPHERE: +0.50
OS_SPHERE: +1.00
OD_ADD: +2.50
OD_OVR_VA: 20/
OS_OVR_VA: 20/
OS_AXIS: 082
OS_ADD: +2.50
OD_CYLINDER: -3.50
OS_CYLINDER: -3.25

## 2025-08-29 ASSESSMENT — LID EXAM ASSESSMENTS
OS_COMMENTS: 1+ ECTROPIO
OS_BLEPHARITIS: LLL LUL 3+
OD_BLEPHARITIS: RLL RUL 3+

## 2025-08-29 ASSESSMENT — VISUAL ACUITY
OD_BCVA: 20/50-2
OS_BCVA: 20/40-2

## 2025-09-08 ENCOUNTER — APPOINTMENT (OUTPATIENT)
Dept: NEUROLOGY | Facility: CLINIC | Age: 75
End: 2025-09-08
Payer: MEDICARE

## 2025-09-08 VITALS
OXYGEN SATURATION: 98 % | BODY MASS INDEX: 27.2 KG/M2 | DIASTOLIC BLOOD PRESSURE: 74 MMHG | WEIGHT: 190 LBS | TEMPERATURE: 97.2 F | HEART RATE: 55 BPM | SYSTOLIC BLOOD PRESSURE: 134 MMHG | HEIGHT: 70 IN

## 2025-09-08 PROCEDURE — 99214 OFFICE O/P EST MOD 30 MIN: CPT

## 2025-09-08 RX ORDER — CARBIDOPA AND LEVODOPA 87.5; 35 MG/1; MG/1
87.5-35 CAPSULE, EXTENDED RELEASE ORAL
Qty: 180 | Refills: 0 | Status: ACTIVE | COMMUNITY
Start: 2025-09-08 | End: 1900-01-01